# Patient Record
Sex: FEMALE | Race: WHITE | NOT HISPANIC OR LATINO | Employment: FULL TIME | URBAN - METROPOLITAN AREA
[De-identification: names, ages, dates, MRNs, and addresses within clinical notes are randomized per-mention and may not be internally consistent; named-entity substitution may affect disease eponyms.]

---

## 2017-11-13 ENCOUNTER — TELEPHONE (OUTPATIENT)
Dept: FAMILY MEDICINE | Facility: CLINIC | Age: 62
End: 2017-11-13

## 2017-11-13 NOTE — TELEPHONE ENCOUNTER
----- Message from Cici Haider sent at 11/13/2017 12:11 PM CST -----  Contact: self   Patient want to speak with a nurse regarding scheduling an appointment today. Patient has pain in hip area, also pain while breathing in. Please call back at [phone] (home)

## 2017-11-14 ENCOUNTER — OFFICE VISIT (OUTPATIENT)
Dept: FAMILY MEDICINE | Facility: CLINIC | Age: 62
End: 2017-11-14
Payer: COMMERCIAL

## 2017-11-14 VITALS
HEART RATE: 81 BPM | DIASTOLIC BLOOD PRESSURE: 74 MMHG | BODY MASS INDEX: 27.83 KG/M2 | SYSTOLIC BLOOD PRESSURE: 122 MMHG | WEIGHT: 163 LBS | TEMPERATURE: 99 F | HEIGHT: 64 IN

## 2017-11-14 DIAGNOSIS — Z23 NEED FOR IMMUNIZATION AGAINST INFLUENZA: ICD-10-CM

## 2017-11-14 DIAGNOSIS — M79.10 MYALGIA: ICD-10-CM

## 2017-11-14 DIAGNOSIS — Z00.00 ANNUAL PHYSICAL EXAM: Primary | ICD-10-CM

## 2017-11-14 DIAGNOSIS — J20.9 ACUTE BRONCHITIS, UNSPECIFIED ORGANISM: ICD-10-CM

## 2017-11-14 DIAGNOSIS — Z23 NEED FOR SHINGLES VACCINE: ICD-10-CM

## 2017-11-14 DIAGNOSIS — R07.89 OTHER CHEST PAIN: ICD-10-CM

## 2017-11-14 DIAGNOSIS — Z79.899 HIGH RISK MEDICATION USE: ICD-10-CM

## 2017-11-14 DIAGNOSIS — Z12.11 SCREEN FOR COLON CANCER: ICD-10-CM

## 2017-11-14 DIAGNOSIS — R50.9 FEVER AND CHILLS: ICD-10-CM

## 2017-11-14 DIAGNOSIS — Z11.59 NEED FOR HEPATITIS C SCREENING TEST: ICD-10-CM

## 2017-11-14 DIAGNOSIS — R05.9 COUGH: ICD-10-CM

## 2017-11-14 DIAGNOSIS — K21.9 GASTROESOPHAGEAL REFLUX DISEASE, ESOPHAGITIS PRESENCE NOT SPECIFIED: ICD-10-CM

## 2017-11-14 DIAGNOSIS — F41.9 ANXIETY: ICD-10-CM

## 2017-11-14 DIAGNOSIS — F32.A DEPRESSION: ICD-10-CM

## 2017-11-14 DIAGNOSIS — Z13.1 SCREENING FOR DIABETES MELLITUS: ICD-10-CM

## 2017-11-14 DIAGNOSIS — Z13.220 SCREENING FOR LIPID DISORDERS: ICD-10-CM

## 2017-11-14 PROCEDURE — 99999 PR PBB SHADOW E&M-EST. PATIENT-LVL III: CPT | Mod: PBBFAC,,, | Performed by: NURSE PRACTITIONER

## 2017-11-14 PROCEDURE — 90471 IMMUNIZATION ADMIN: CPT | Mod: S$GLB,,, | Performed by: FAMILY MEDICINE

## 2017-11-14 PROCEDURE — 99396 PREV VISIT EST AGE 40-64: CPT | Mod: 25,S$GLB,, | Performed by: NURSE PRACTITIONER

## 2017-11-14 PROCEDURE — 90686 IIV4 VACC NO PRSV 0.5 ML IM: CPT | Mod: S$GLB,,, | Performed by: FAMILY MEDICINE

## 2017-11-14 RX ORDER — AMOXICILLIN AND CLAVULANATE POTASSIUM 875; 125 MG/1; MG/1
1 TABLET, FILM COATED ORAL 2 TIMES DAILY WITH MEALS
Qty: 14 TABLET | Refills: 0 | Status: SHIPPED | OUTPATIENT
Start: 2017-11-14 | End: 2017-12-13 | Stop reason: ALTCHOICE

## 2017-11-14 RX ORDER — PROMETHAZINE HYDROCHLORIDE AND DEXTROMETHORPHAN HYDROBROMIDE 6.25; 15 MG/5ML; MG/5ML
5 SYRUP ORAL
Qty: 180 ML | Refills: 0 | Status: SHIPPED | OUTPATIENT
Start: 2017-11-14 | End: 2017-12-29

## 2017-11-14 RX ORDER — DULOXETIN HYDROCHLORIDE 60 MG/1
CAPSULE, DELAYED RELEASE ORAL
Qty: 30 CAPSULE | Refills: 10 | Status: SHIPPED | OUTPATIENT
Start: 2017-11-14 | End: 2018-12-18 | Stop reason: SDUPTHER

## 2017-11-14 NOTE — PATIENT INSTRUCTIONS
Acute Bronchitis  Your healthcare provider has told you that you have acute bronchitis. Bronchitis is infection or inflammation of the bronchial tubes (airways in the lungs). Normally, air moves easily in and out of the airways. Bronchitis narrows the airways, making it harder for air to flow in and out of the lungs. This causes symptoms such as shortness of breath, coughing up yellow or green mucus, and wheezing. Bronchitis can be acute or chronic. Acute means the condition comes on quickly and goes away in a short time, usually within 3 to 10 days. Chronic means a condition lasts a long time and often comes back.    What causes acute bronchitis?  Acute bronchitis almost always starts as a viral respiratory infection, such as a cold or the flu. Certain factors make it more likely for a cold or flu to turn into bronchitis. These include being very young, being elderly, having a heart or lung problem, or having a weak immune system. Cigarette smoking also makes bronchitis more likely.  When bronchitis develops, the airways become swollen. The airways may also become infected with bacteria. This is known as a secondary infection.  Diagnosing acute bronchitis  Your healthcare provider will examine you and ask about your symptoms and health history. You may also have a sputum culture to test the fluid in your lungs. Chest X-rays may be done to look for infection in the lungs.  Treating acute bronchitis  Bronchitis usually clears up as the cold or flu goes away. You can help feel better faster by doing the following:  · Take medicine as directed. You may be told to take ibuprofen or other over-the-counter medicines. These help relieve inflammation in your bronchial tubes. Your healthcare provider may prescribe an inhaler to help open up the bronchial tubes. Most of the time, acute bronchitis is caused by a viral infection. Antibiotics are usually not prescribed for viral infections.  · Drink plenty of fluids, such as  water, juice, or warm soup. Fluids loosen mucus so that you can cough it up. This helps you breathe more easily. Fluids also prevent dehydration.  · Make sure you get plenty of rest.  · Do not smoke. Do not allow anyone else to smoke in your home.  Recovery and follow-up  Follow up with your doctor as you are told. You will likely feel better in a week or two. But a dry cough can linger beyond that time. Let your doctor know if you still have symptoms (other than a dry cough) after 2 weeks, or if youre prone to getting bronchial infections. Take steps to protect yourself from future infections. These steps include stopping smoking and avoiding tobacco smoke, washing your hands often, and getting a yearly flu shot.  When to call your healthcare provider  Call the healthcare provider if you have any of the following:  · Fever of 100.4°F (38.0°C) or higher, or as advised  · Symptoms that get worse, or new symptoms  · Trouble breathing  · Symptoms that dont start to improve within a week, or within 3 days of taking antibiotics   Date Last Reviewed: 12/1/2016  © 5257-6266 [employer] [place]. [address] [place] [address], [place] [address]. All rights reserved. This information is not intended as a substitute for professional medical care. Always follow your healthcare professional's instructions.

## 2017-11-14 NOTE — PROGRESS NOTES
Subjective:       Patient ID: Gaby Donohue is a 62 y.o. female.    Chief Complaint: Cough (caused chest pain )    HPI   Chief Complaint  Chief Complaint   Patient presents with    Cough     caused chest pain        HPI  Gaby Donohue is a 62 y.o. female with medical diagnoses as listed in the medical history and problem list that presents with c/o cough at night with chest pain when breathing in. States cough was almost constant last week, felt a raw burning feeling to chest with a deep breath.  Has been fatigued and slept all day on Sunday.  Does have some runny nose and cough is productive of yellow secretions. Thinks may have had fever on Sunday.  Has tried some dayquil and nyquil, states nyquil did allow her to sleep but did not notice a change of symptoms.  Has also been feeling kind of achy all over. Symptoms constant for about 10 days. Patient has not been in for a routine check up and is overdue for some health maintenance.  Due for lipid panel, hep C screening, colonoscopy, flu immunization and wants to discuss zostavax.  Patient has pap with Dr. Jimenes and already has an order for her mammogram. BMI today is 27.98 with a 4 pound weight loss since last visit.     PAST MEDICAL HISTORY:  Past Medical History:   Diagnosis Date    Anxiety     Cancer     skin ca on face    Depression     Hyperlipidemia     Wears glasses        PAST SURGICAL HISTORY:  Past Surgical History:   Procedure Laterality Date    CHOLECYSTECTOMY      COLONOSCOPY  4/20/12    Dr. Jaime, 5 year recheck    FRACTURE SURGERY  11/02/2013    left arm Dr Cooper    laporoscopy         SOCIAL HISTORY:  Social History     Social History    Marital status:      Spouse name: N/A    Number of children: N/A    Years of education: N/A     Occupational History    Not on file.     Social History Main Topics    Smoking status: Former Smoker     Packs/day: 1.50     Years: 21.00     Quit date: 4/20/1984    Smokeless tobacco: Never  Used    Alcohol use Yes      Comment: occ    Drug use: No    Sexual activity: Yes     Partners: Male     Other Topics Concern    Not on file     Social History Narrative    No narrative on file       FAMILY HISTORY:  Family History   Problem Relation Age of Onset    COPD Mother     Emphysema Mother     Heart disease Father     Cancer Sister      skin cancer face    Cancer Brother      skin cancer face     No Known Problems Daughter     Cancer Sister      skin cancer face    Cancer Sister      skin cancer face     No Known Problems Brother     No Known Problems Daughter     No Known Problems Maternal Grandmother     No Known Problems Maternal Grandfather     No Known Problems Paternal Grandmother     No Known Problems Paternal Grandfather     No Known Problems Maternal Aunt     No Known Problems Maternal Uncle     No Known Problems Paternal Aunt     No Known Problems Paternal Uncle        ALLERGIES AND MEDICATIONS: updated and reviewed.  Review of patient's allergies indicates:   Allergen Reactions    No known drug allergies      Current Outpatient Prescriptions   Medication Sig Dispense Refill    duloxetine (CYMBALTA) 60 MG capsule TAKE 1 CAPSULE BY MOUTH ONCE DAILY 30 capsule 10    lansoprazole (PREVACID) 30 MG capsule TAKE 1 CAPSULE BY MOUTH ONCE DAILY 30 capsule 11    amoxicillin-clavulanate 875-125mg (AUGMENTIN) 875-125 mg per tablet Take 1 tablet by mouth 2 (two) times daily with meals. 14 tablet 0    promethazine-dextromethorphan (PROMETHAZINE-DM) 6.25-15 mg/5 mL Syrp Take 5 mLs by mouth every 4 to 6 hours as needed (cough). 180 mL 0     Current Facility-Administered Medications   Medication Dose Route Frequency Provider Last Rate Last Dose    triamcinolone acetonide injection 40 mg  40 mg Other 1 time in Clinic/HOD Duke Santiago MD           Review of Systems   Constitutional: Positive for appetite change, chills, fatigue and fever.        Appetite decreased over weekend, unable  "to taste, fatigued, feels on Sunday had chills with fever but did not measure temperature   HENT: Positive for congestion, rhinorrhea and trouble swallowing. Negative for ear pain, sinus pain, sinus pressure and sore throat.         Has had nasal congestion and runny nose, did have sore throat and lost voice one day   Eyes: Negative for visual disturbance.   Respiratory: Positive for cough. Negative for shortness of breath.         Cough, productive, burning to chest   Cardiovascular: Negative for chest pain, palpitations and leg swelling.   Gastrointestinal: Positive for nausea. Negative for abdominal pain, diarrhea and vomiting.        Some nausea and upset stomach on Saturday.    Takes prevacid for GERD with effect   Genitourinary: Negative for difficulty urinating.   Musculoskeletal: Positive for myalgias.   Skin: Negative for rash and wound.   Neurological: Positive for headaches.        Has had headache and has tried advil and tylenol with minimal short term relief   Hematological:        Some tender lymph nodes to neck   Psychiatric/Behavioral: Positive for sleep disturbance. Negative for dysphoric mood and suicidal ideas. The patient is not nervous/anxious.         Cough effecting sleep, normally sleeps well        Objective:       Vitals:    11/14/17 1349   BP: 122/74   BP Location: Right arm   Patient Position: Sitting   BP Method: Medium (Automatic)   Pulse: 81   Temp: 98.7 °F (37.1 °C)   TempSrc: Oral   Weight: 73.9 kg (163 lb)   Height: 5' 4" (1.626 m)     Physical Exam   Constitutional: She is oriented to person, place, and time. Vital signs are normal. She appears well-developed and well-nourished. No distress.   HENT:   Head: Normocephalic and atraumatic.   Right Ear: External ear and ear canal normal. Tympanic membrane is erythematous.   Left Ear: Tympanic membrane, external ear and ear canal normal.   Nose: Nose normal. No mucosal edema. Right sinus exhibits no maxillary sinus tenderness and no " frontal sinus tenderness. Left sinus exhibits no maxillary sinus tenderness and no frontal sinus tenderness.   Mouth/Throat: Mucous membranes are normal. Oropharyngeal exudate present.   TM to right with mild redness over bony landmark.    Throat with thick yellow PND noted   Eyes: Conjunctivae and lids are normal. Right conjunctiva is not injected. Left conjunctiva is not injected.   Neck: Normal carotid pulses present. Carotid bruit is not present.   Cardiovascular: Normal rate, regular rhythm, S1 normal, S2 normal and normal heart sounds.    No murmur heard.  Pulses:       Carotid pulses are 2+ on the right side, and 2+ on the left side.       Radial pulses are 2+ on the right side, and 2+ on the left side.   No edema   Pulmonary/Chest: Effort normal and breath sounds normal. No respiratory distress. She has no decreased breath sounds. She has no wheezes. She has no rhonchi. She has no rales.   Musculoskeletal: Normal range of motion.   Neurological: She is alert and oriented to person, place, and time. She has normal strength.   Skin: Skin is warm, dry and intact. Capillary refill takes less than 2 seconds. She is not diaphoretic. No pallor.   Psychiatric: She has a normal mood and affect. Her speech is normal and behavior is normal. Judgment and thought content normal. Cognition and memory are normal.   Nursing note and vitals reviewed.      Assessment:       1. Annual physical exam    2. Acute bronchitis, unspecified organism    3. Cough    4. Other chest pain    5. Myalgia    6. Fever and chills    7. Need for immunization against influenza    8. Need for hepatitis C screening test    9. High risk medication use    10. Screening for lipid disorders    11. Screening for diabetes mellitus    12. Screen for colon cancer    13. BMI 27.0-27.9,adult    14. Gastroesophageal reflux disease, esophagitis presence not specified    15. Anxiety    16. Need for shingles vaccine        Plan:   Gaby was seen today for cough  and routine exam.  Discussed healthy diet, regular exercise, necessary labs, age appropriate cancer screening, and routine vaccinations.    Diagnoses and all orders for this visit:    Annual physical exam    Discussed healthy diet, regular exercise, necessary labs, age appropriate cancer screening, and routine vaccinations.    Acute bronchitis, unspecified organism  -     amoxicillin-clavulanate 875-125mg (AUGMENTIN) 875-125 mg per tablet; Take 1 tablet by mouth 2 (two) times daily with meals.    Cough  -     amoxicillin-clavulanate 875-125mg (AUGMENTIN) 875-125 mg per tablet; Take 1 tablet by mouth 2 (two) times daily with meals.  -     promethazine-dextromethorphan (PROMETHAZINE-DM) 6.25-15 mg/5 mL Syrp; Take 5 mLs by mouth every 4 to 6 hours as needed (cough).    Other chest pain  -     amoxicillin-clavulanate 875-125mg (AUGMENTIN) 875-125 mg per tablet; Take 1 tablet by mouth 2 (two) times daily with meals.    Myalgia   Take ibuprofen 200-400 mg po TID PRN aches, pain, or fever  Fever and chills   Take ibuprofen 200-400 mg po TID PRN aches, pain, or fever  Need for immunization against influenza  -     Influenza - Quadrivalent (3 years & older) (PF)    Need for hepatitis C screening test  -     Hepatitis C antibody; Future    High risk medication use  -     Comprehensive metabolic panel; Future    Screening for lipid disorders  -     Comprehensive metabolic panel; Future  -     Lipid panel; Future    Screening for diabetes mellitus  -     Comprehensive metabolic panel; Future    Screen for colon cancer  -     Ambulatory referral to Gastroenterology    BMI 27.0-27.9,adult   Weight loss recommended with well balanced diet and portion controlled meals and increased activity.   Gastroesophageal reflux disease, esophagitis presence not specified   Stable with prevacid, continue medication  Anxiety   Stable with cymbalta, continue medication  Need for shingles vaccine    Instructed to check with medical insurer to see  if immunization is covered benefit    Return in about 1 year (around 11/14/2018), or if symptoms worsen or fail to improve.

## 2017-11-27 DIAGNOSIS — K21.9 GASTROESOPHAGEAL REFLUX DISEASE WITHOUT ESOPHAGITIS: ICD-10-CM

## 2017-11-27 RX ORDER — LANSOPRAZOLE 30 MG/1
CAPSULE, DELAYED RELEASE ORAL
Qty: 30 CAPSULE | Refills: 11 | Status: SHIPPED | OUTPATIENT
Start: 2017-11-27 | End: 2018-12-04 | Stop reason: SDUPTHER

## 2017-12-11 ENCOUNTER — TELEPHONE (OUTPATIENT)
Dept: FAMILY MEDICINE | Facility: CLINIC | Age: 62
End: 2017-12-11

## 2017-12-11 NOTE — TELEPHONE ENCOUNTER
----- Message from Gaby Leon sent at 12/11/2017  2:10 PM CST -----  Contact: Patient  Gaby, patient [phone], Patient was in a few weeks ago  with bronchitis and it still having issues. Please advise. Thanks.

## 2017-12-11 NOTE — TELEPHONE ENCOUNTER
Patient has pain in lungs when she takes breath- was seen 11/14/17 for bronchitis- has cough in morning and when lays down- patient advised to be seen- she has to check her schedule and will call back to make one.

## 2017-12-12 ENCOUNTER — TELEPHONE (OUTPATIENT)
Dept: FAMILY MEDICINE | Facility: CLINIC | Age: 62
End: 2017-12-12

## 2017-12-12 NOTE — TELEPHONE ENCOUNTER
----- Message from Fern Valenzuela sent at 12/12/2017  8:12 AM CST -----  Contact: Self  Patient spoke to Lauryn yesterday and is requesting same day appointment.  Please call [phone] (home).  Thank you!

## 2017-12-13 ENCOUNTER — OFFICE VISIT (OUTPATIENT)
Dept: FAMILY MEDICINE | Facility: CLINIC | Age: 62
End: 2017-12-13
Payer: COMMERCIAL

## 2017-12-13 VITALS
BODY MASS INDEX: 27.92 KG/M2 | TEMPERATURE: 98 F | DIASTOLIC BLOOD PRESSURE: 89 MMHG | RESPIRATION RATE: 14 BRPM | SYSTOLIC BLOOD PRESSURE: 136 MMHG | HEIGHT: 64 IN | WEIGHT: 163.56 LBS | OXYGEN SATURATION: 97 % | HEART RATE: 75 BPM

## 2017-12-13 DIAGNOSIS — J20.0 ACUTE BRONCHITIS DUE TO MYCOPLASMA PNEUMONIAE: Primary | ICD-10-CM

## 2017-12-13 PROCEDURE — 99214 OFFICE O/P EST MOD 30 MIN: CPT | Mod: S$GLB,,, | Performed by: FAMILY MEDICINE

## 2017-12-13 PROCEDURE — 99999 PR PBB SHADOW E&M-EST. PATIENT-LVL III: CPT | Mod: PBBFAC,,, | Performed by: FAMILY MEDICINE

## 2017-12-13 RX ORDER — AZITHROMYCIN 250 MG/1
TABLET, FILM COATED ORAL
Qty: 6 TABLET | Refills: 0 | Status: SHIPPED | OUTPATIENT
Start: 2017-12-13 | End: 2017-12-18

## 2017-12-13 NOTE — PROGRESS NOTES
Subjective:       Patient ID: Gaby Donohue is a 62 y.o. female.    Chief Complaint: Cough    Exposed to granddaughter who is on Zithromax for bronchitis.      Cough   This is a new problem. The current episode started in the past 7 days. The problem has been gradually worsening. The cough is productive of sputum. Associated symptoms include chest pain, headaches and shortness of breath. Pertinent negatives include no fever, rash, sore throat or wheezing. She has tried prescription cough suppressant for the symptoms. The treatment provided mild relief.     Review of Systems   Constitutional: Negative for fever.   HENT: Negative for sore throat.    Respiratory: Positive for cough and shortness of breath. Negative for wheezing.    Cardiovascular: Positive for chest pain.   Gastrointestinal: Negative for abdominal pain and nausea.   Skin: Negative for rash.   Neurological: Positive for headaches.   All other systems reviewed and are negative.      Objective:      Physical Exam   Constitutional: She appears well-developed. No distress.   HENT:   Right Ear: Tympanic membrane and ear canal normal. Tympanic membrane is not erythematous.   Left Ear: Tympanic membrane and ear canal normal. Tympanic membrane is not erythematous.   Nose: Mucosal edema present. Right sinus exhibits no maxillary sinus tenderness. Left sinus exhibits no maxillary sinus tenderness.   Mouth/Throat: Posterior oropharyngeal erythema present.   Neck: Neck supple.   Cardiovascular: Normal rate and regular rhythm.    No murmur heard.  Pulmonary/Chest: Effort normal and breath sounds normal. She has no wheezes. She has no rales.   Lymphadenopathy:     She has no cervical adenopathy.   Skin: Skin is warm and dry.       Assessment:       1. Acute bronchitis due to Mycoplasma pneumoniae        Plan:         Gaby was seen today for cough.    Diagnoses and all orders for this visit:    Acute bronchitis due to Mycoplasma pneumoniae  -     azithromycin  (Z-CONSTANCE) 250 MG tablet; Take 2 tablets by mouth on day 1; Take 1 tablet by mouth on days 2-5

## 2017-12-15 ENCOUNTER — TELEPHONE (OUTPATIENT)
Dept: FAMILY MEDICINE | Facility: CLINIC | Age: 62
End: 2017-12-15

## 2017-12-15 DIAGNOSIS — R06.2 WHEEZING: Primary | ICD-10-CM

## 2017-12-15 DIAGNOSIS — R06.02 SHORTNESS OF BREATH: ICD-10-CM

## 2017-12-15 NOTE — TELEPHONE ENCOUNTER
I have sent an inhaler to her pharmacy, [facility] on [address], that should help with inflammation and any shortness of breath and wheezing.   Thanks.  Meme

## 2017-12-15 NOTE — TELEPHONE ENCOUNTER
Patient asking if there is medication she can get to help with inflammation in lungs- hurts when she breathes- same symptoms as when seen 12/13/17.

## 2017-12-15 NOTE — TELEPHONE ENCOUNTER
----- Message from Magalis Membreno sent at 12/15/2017  9:38 AM CST -----  Contact: patient  Patient calling in regards to speaking with the Nurse. She has questions about her medication. Please advise.  Call back   Thanks!

## 2017-12-18 ENCOUNTER — TELEPHONE (OUTPATIENT)
Dept: FAMILY MEDICINE | Facility: CLINIC | Age: 62
End: 2017-12-18

## 2017-12-18 NOTE — TELEPHONE ENCOUNTER
----- Message from Destin Sutherland sent at 12/18/2017 12:34 PM CST -----  Contact: Gaby  Calling to speak with a nurse about some medications. Did not want to leave details because it is an ongoing conversation. Call [phone] (home)   Thanks!

## 2017-12-19 ENCOUNTER — DOCUMENTATION ONLY (OUTPATIENT)
Dept: FAMILY MEDICINE | Facility: CLINIC | Age: 62
End: 2017-12-19

## 2017-12-19 NOTE — PROGRESS NOTES
Pre-Visit Chart Review  For Appointment Scheduled on 12-20-17    Health Maintenance Due   Topic Date Due    Hepatitis C Screening  1955    TETANUS VACCINE  03/19/1973    Mammogram  01/02/2015    Pap Smear with HPV Cotest  01/02/2016    Lipid Panel  02/03/2017    Colonoscopy  04/20/2017

## 2017-12-20 ENCOUNTER — OFFICE VISIT (OUTPATIENT)
Dept: FAMILY MEDICINE | Facility: CLINIC | Age: 62
End: 2017-12-20
Attending: FAMILY MEDICINE
Payer: COMMERCIAL

## 2017-12-20 VITALS
TEMPERATURE: 98 F | HEIGHT: 64 IN | RESPIRATION RATE: 16 BRPM | DIASTOLIC BLOOD PRESSURE: 74 MMHG | OXYGEN SATURATION: 97 % | SYSTOLIC BLOOD PRESSURE: 130 MMHG | BODY MASS INDEX: 27.59 KG/M2 | HEART RATE: 83 BPM | WEIGHT: 161.63 LBS

## 2017-12-20 DIAGNOSIS — R05.9 COUGH: Primary | ICD-10-CM

## 2017-12-20 DIAGNOSIS — J98.01 BRONCHOSPASM: ICD-10-CM

## 2017-12-20 PROCEDURE — 99213 OFFICE O/P EST LOW 20 MIN: CPT | Mod: S$GLB,,, | Performed by: FAMILY MEDICINE

## 2017-12-20 PROCEDURE — 99999 PR PBB SHADOW E&M-EST. PATIENT-LVL III: CPT | Mod: PBBFAC,,, | Performed by: FAMILY MEDICINE

## 2017-12-20 RX ORDER — PREDNISONE 20 MG/1
TABLET ORAL
Qty: 8 TABLET | Refills: 0 | Status: SHIPPED | OUTPATIENT
Start: 2017-12-20 | End: 2017-12-29

## 2017-12-20 RX ORDER — MONTELUKAST SODIUM 10 MG/1
10 TABLET ORAL NIGHTLY
Qty: 30 TABLET | Refills: 5 | Status: SHIPPED | OUTPATIENT
Start: 2017-12-20 | End: 2019-01-29 | Stop reason: ALTCHOICE

## 2017-12-20 NOTE — PROGRESS NOTES
Subjective:       Patient ID: Gaby Donohue is a 62 y.o. female.    Chief Complaint: Cough    62-year-old female has been experiencing problems with a cough for several months.  She was initially seen and treated with Augmentin and then recently given Zithromax.  She's also had at least one course of steroids and an injection.  She comes in today because of a lingering cough, usually nonproductive in with a sensation of ulceration in the throat.  She has no fever or chills and no pleuritic chest pain.  She does have problems with reflux but doesn't appear to be related to the cough.  She is using Prevacid for that and has noted in the last 2 weeks that she is having more sour stomach sensation than usual.    Past Medical History:  No date: Anxiety  No date: Cancer      Comment: skin ca on face  No date: Depression  No date: Hyperlipidemia  No date: Wears glasses    Past Surgical History:  No date: CHOLECYSTECTOMY  4/20/12: COLONOSCOPY      Comment: Dr. Jaime, 5 year recheck  11/02/2013: FRACTURE SURGERY      Comment: left arm Dr Cooper  No date: laporoscopy      Current Outpatient Prescriptions:     DULoxetine (CYMBALTA) 60 MG capsule, TAKE 1 CAPSULE BY MOUTH ONCE DAILY, Disp: 30 capsule, Rfl: 10    lansoprazole (PREVACID) 30 MG capsule, TAKE 1 CAPSULE BY MOUTH ONCE DAILY, Disp: 30 capsule, Rfl: 11    promethazine-dextromethorphan (PROMETHAZINE-DM) 6.25-15 mg/5 mL Syrp, Take 5 mLs by mouth every 4 to 6 hours as needed (cough)., Disp: 180 mL, Rfl: 0          Review of Systems   Constitutional: Negative for chills and fever.   HENT: Negative for congestion, postnasal drip and rhinorrhea.    Respiratory: Positive for cough and wheezing. Negative for chest tightness and shortness of breath.    Cardiovascular: Negative for chest pain and palpitations.   Gastrointestinal: Positive for abdominal pain (dyspepsia). Negative for nausea and vomiting.       Objective:      Physical Exam   Constitutional: She appears  well-developed. No distress.   Good blood pressure control  Mildly overweight with BMI 27.7 she is down 9/10 of a pound from her last visit with me October 1, 2015   HENT:   Head: Normocephalic and atraumatic.   Right Ear: External ear normal.   Left Ear: External ear normal.   Nose: Nose normal.   Mouth/Throat: Oropharynx is clear and moist. No oropharyngeal exudate.   Eyes: EOM are normal. Pupils are equal, round, and reactive to light. No scleral icterus.   Neck: Normal range of motion. Neck supple. No JVD present. No tracheal deviation present. No thyromegaly present.   Cardiovascular: Normal rate, regular rhythm and normal heart sounds.  Exam reveals no gallop and no friction rub.    No murmur heard.  Pulmonary/Chest: Effort normal and breath sounds normal. No respiratory distress. She has no decreased breath sounds. She has no wheezes. She has no rhonchi. She has no rales. Chest wall is not dull to percussion. She exhibits no tenderness and no crepitus.   Lymphadenopathy:     She has no cervical adenopathy.   Skin: She is not diaphoretic.   Nursing note and vitals reviewed.      Assessment:       1. Cough    2. Bronchospasm        Plan:       1. Cough  - predniSONE (DELTASONE) 20 MG tablet; Take two a day for three days then one a day for two days  Dispense: 8 tablet; Refill: 0  - montelukast (SINGULAIR) 10 mg tablet; Take 1 tablet (10 mg total) by mouth every evening.  Dispense: 30 tablet; Refill: 5    2. Bronchospasm  - predniSONE (DELTASONE) 20 MG tablet; Take two a day for three days then one a day for two days  Dispense: 8 tablet; Refill: 0  - montelukast (SINGULAIR) 10 mg tablet; Take 1 tablet (10 mg total) by mouth every evening.  Dispense: 30 tablet; Refill: 5

## 2017-12-23 ENCOUNTER — NURSE TRIAGE (OUTPATIENT)
Dept: ADMINISTRATIVE | Facility: CLINIC | Age: 62
End: 2017-12-23

## 2017-12-23 RX ORDER — DULOXETIN HYDROCHLORIDE 60 MG/1
60 CAPSULE, DELAYED RELEASE ORAL DAILY
Qty: 3 CAPSULE | Refills: 0 | Status: SHIPPED | OUTPATIENT
Start: 2017-12-23 | End: 2017-12-26

## 2017-12-23 NOTE — TELEPHONE ENCOUNTER
Reason for Disposition   Caller requesting a refill, no triage required, and triager able to refill per unit policy    Protocols used: ST MEDICATION QUESTION CALL-A-AH

## 2017-12-23 NOTE — TELEPHONE ENCOUNTER
Reason for Disposition   Caller has already spoken to PCP or another triager    Protocols used: ST INFORMATION ONLY CALL-A-AH    I called Cymbalta in earlier today for this patient. See notes for further details.

## 2017-12-28 ENCOUNTER — TELEPHONE (OUTPATIENT)
Dept: FAMILY MEDICINE | Facility: CLINIC | Age: 62
End: 2017-12-28

## 2017-12-28 NOTE — TELEPHONE ENCOUNTER
"Has had "pinpricks" of sort of a stinging sensation to hands and a foot- advised to keep appt tomorrow  "

## 2017-12-28 NOTE — TELEPHONE ENCOUNTER
----- Message from Randi Smith sent at 12/28/2017 10:15 AM CST -----  Contact: Patient  Gaby, patient [phone] calling because she would like a call from the nurse, patient said that her hands are very itchy, she would like to know if she should see a doctor/ recommend any lotions. Please advise. Thanks.

## 2017-12-29 ENCOUNTER — LAB VISIT (OUTPATIENT)
Dept: LAB | Facility: HOSPITAL | Age: 62
End: 2017-12-29
Attending: FAMILY MEDICINE
Payer: COMMERCIAL

## 2017-12-29 ENCOUNTER — OFFICE VISIT (OUTPATIENT)
Dept: FAMILY MEDICINE | Facility: CLINIC | Age: 62
End: 2017-12-29
Payer: COMMERCIAL

## 2017-12-29 VITALS
DIASTOLIC BLOOD PRESSURE: 83 MMHG | TEMPERATURE: 98 F | SYSTOLIC BLOOD PRESSURE: 139 MMHG | HEART RATE: 83 BPM | HEIGHT: 64 IN | WEIGHT: 163.13 LBS | BODY MASS INDEX: 27.85 KG/M2

## 2017-12-29 DIAGNOSIS — R20.2 TINGLING: ICD-10-CM

## 2017-12-29 DIAGNOSIS — R20.2 TINGLING: Primary | ICD-10-CM

## 2017-12-29 DIAGNOSIS — G62.9 NEUROPATHY: ICD-10-CM

## 2017-12-29 LAB
ESTIMATED AVG GLUCOSE: 100 MG/DL
FOLATE SERPL-MCNC: 5.9 NG/ML
HBA1C MFR BLD HPLC: 5.1 %
VIT B12 SERPL-MCNC: 616 PG/ML

## 2017-12-29 PROCEDURE — 82746 ASSAY OF FOLIC ACID SERUM: CPT

## 2017-12-29 PROCEDURE — 99213 OFFICE O/P EST LOW 20 MIN: CPT | Mod: S$GLB,,, | Performed by: NURSE PRACTITIONER

## 2017-12-29 PROCEDURE — 36415 COLL VENOUS BLD VENIPUNCTURE: CPT | Mod: PO

## 2017-12-29 PROCEDURE — 99999 PR PBB SHADOW E&M-EST. PATIENT-LVL III: CPT | Mod: PBBFAC,,, | Performed by: NURSE PRACTITIONER

## 2017-12-29 PROCEDURE — 82607 VITAMIN B-12: CPT

## 2017-12-29 PROCEDURE — 83036 HEMOGLOBIN GLYCOSYLATED A1C: CPT

## 2017-12-29 RX ORDER — GABAPENTIN 100 MG/1
100 CAPSULE ORAL 3 TIMES DAILY PRN
Qty: 90 CAPSULE | Refills: 0 | Status: SHIPPED | OUTPATIENT
Start: 2017-12-29 | End: 2019-01-29 | Stop reason: ALTCHOICE

## 2017-12-29 NOTE — PROGRESS NOTES
Subjective:       Patient ID: Gaby Donohue is a 62 y.o. female.    Chief Complaint: tingling in hands    Ms. Donohue presents to the clinic today for bilateral hand tingling which began one week ago and occurs on and off.  This is worsened by the cold weather but she has noticed no color changes to the hands.  She reports dry skin to her hands and uses Gold Bond lotion.  She tried hydrocortisone cream for the tingling but this did not help.  Denies neck pain or hand weakness.  The feet are also mildly tingling but not as bad as the hands.        Review of Systems   Constitutional: Negative for chills and fever.   Eyes: Negative for visual disturbance.   Respiratory: Negative for cough and shortness of breath.    Cardiovascular: Negative for chest pain, palpitations and leg swelling.   Gastrointestinal: Negative for abdominal pain, constipation and diarrhea.   Neurological: Negative for weakness, numbness and headaches.        Tingling bilateral hands       Objective:      Physical Exam   Constitutional: She is oriented to person, place, and time. She appears well-nourished. No distress.   HENT:   Head: Normocephalic and atraumatic.   Right Ear: External ear normal.   Left Ear: External ear normal.   Mouth/Throat: Oropharynx is clear and moist. No oropharyngeal exudate.   Eyes: Pupils are equal, round, and reactive to light. Right eye exhibits no discharge. Left eye exhibits no discharge.   Neck: Neck supple. No thyromegaly present.   Cardiovascular: Normal rate and regular rhythm.  Exam reveals no gallop and no friction rub.    No murmur heard.  Pulses:       Radial pulses are 2+ on the right side, and 2+ on the left side.   Pulmonary/Chest: Effort normal and breath sounds normal. No respiratory distress. She has no wheezes. She has no rales.   Abdominal: Soft. She exhibits no distension. There is no tenderness.   Lymphadenopathy:     She has no cervical adenopathy.   Neurological: She is alert and oriented to  person, place, and time. Coordination normal.   Skin: Skin is warm and dry. No rash noted.   Dry skin to hands bilaterally   Psychiatric: She has a normal mood and affect. Her behavior is normal. Thought content normal.   Vitals reviewed.          Current Outpatient Prescriptions:     DULoxetine (CYMBALTA) 60 MG capsule, TAKE 1 CAPSULE BY MOUTH ONCE DAILY, Disp: 30 capsule, Rfl: 10    lansoprazole (PREVACID) 30 MG capsule, TAKE 1 CAPSULE BY MOUTH ONCE DAILY, Disp: 30 capsule, Rfl: 11    montelukast (SINGULAIR) 10 mg tablet, Take 1 tablet (10 mg total) by mouth every evening., Disp: 30 tablet, Rfl: 5    gabapentin (NEURONTIN) 100 MG capsule, Take 1 capsule (100 mg total) by mouth 3 (three) times daily as needed., Disp: 90 capsule, Rfl: 0    Current Facility-Administered Medications:     triamcinolone acetonide injection 40 mg, 40 mg, Other, 1 time in Clinic/HOD, Duke Santiago MD  Assessment:       1. Tingling    2. Neuropathy        Plan:       Tingling  -     Hemoglobin A1c; Future; Expected date: 12/29/2017  -     Vitamin B12; Future; Expected date: 12/29/2017  -     Folate; Future; Expected date: 12/29/2017    Neuropathy  Use gloves and keep hands warm since this seems to relieve symptom.    -     gabapentin (NEURONTIN) 100 MG capsule; Take 1 capsule (100 mg total) by mouth 3 (three) times daily as needed.  Dispense: 90 capsule; Refill: 0

## 2018-01-02 ENCOUNTER — TELEPHONE (OUTPATIENT)
Dept: FAMILY MEDICINE | Facility: CLINIC | Age: 63
End: 2018-01-02

## 2018-01-02 NOTE — TELEPHONE ENCOUNTER
----- Message from Radha Rodney sent at 1/2/2018 12:50 PM CST -----  Contact: self  Patient is calling for LAB results.  Please call patient at [phone]. Thanks!

## 2018-01-03 ENCOUNTER — TELEPHONE (OUTPATIENT)
Dept: FAMILY MEDICINE | Facility: CLINIC | Age: 63
End: 2018-01-03

## 2018-01-03 NOTE — TELEPHONE ENCOUNTER
----- Message from Natividad Cowart sent at 1/3/2018 10:17 AM CST -----  Contact: self  Patient is returning Lauryn's call regarding results. Patient requesting a call before the end of today, please.  Please call patient at [phone].  Thanks!

## 2018-06-21 DIAGNOSIS — Z12.39 BREAST CANCER SCREENING: ICD-10-CM

## 2018-08-08 ENCOUNTER — TELEPHONE (OUTPATIENT)
Dept: GASTROENTEROLOGY | Facility: CLINIC | Age: 63
End: 2018-08-08

## 2018-08-08 NOTE — TELEPHONE ENCOUNTER
----- Message from Alyssa Oliver sent at 8/8/2018 11:48 AM CDT -----  Contact: Patient  Patient is calling to schedule a colonoscopy, her last one was in 2012 with Dr. Jaime.  Call Back#[phone]  Thanks

## 2018-08-28 ENCOUNTER — TELEPHONE (OUTPATIENT)
Dept: FAMILY MEDICINE | Facility: CLINIC | Age: 63
End: 2018-08-28

## 2018-08-28 DIAGNOSIS — F41.8 SITUATIONAL ANXIETY: Primary | ICD-10-CM

## 2018-08-28 RX ORDER — ALPRAZOLAM 0.25 MG/1
0.25 TABLET ORAL 3 TIMES DAILY PRN
Qty: 30 TABLET | Refills: 0 | Status: SHIPPED | OUTPATIENT
Start: 2018-08-28 | End: 2019-01-29 | Stop reason: ALTCHOICE

## 2018-08-28 NOTE — TELEPHONE ENCOUNTER
Patient has stressful upcoming trip to [place] to visit family- she has had Xanax 0.5mg #30 for this in the past and is asking to have it sent in to [facility].

## 2018-08-28 NOTE — TELEPHONE ENCOUNTER
----- Message from Keily Mas sent at 8/28/2018 12:17 PM CDT -----  Contact: self [phone]  Please call her regarding medication she is interested in.  Thank you!

## 2018-09-11 ENCOUNTER — OFFICE VISIT (OUTPATIENT)
Dept: UROLOGY | Facility: CLINIC | Age: 63
End: 2018-09-11
Payer: COMMERCIAL

## 2018-09-11 VITALS
WEIGHT: 167.75 LBS | TEMPERATURE: 98 F | HEART RATE: 78 BPM | HEIGHT: 64 IN | BODY MASS INDEX: 28.64 KG/M2 | SYSTOLIC BLOOD PRESSURE: 135 MMHG | RESPIRATION RATE: 18 BRPM | DIASTOLIC BLOOD PRESSURE: 79 MMHG

## 2018-09-11 DIAGNOSIS — N36.2 URETHRAL CARUNCLE: Primary | ICD-10-CM

## 2018-09-11 DIAGNOSIS — N28.1 RENAL CYST: ICD-10-CM

## 2018-09-11 LAB
BILIRUB SERPL-MCNC: NORMAL MG/DL
BLOOD URINE, POC: NORMAL
COLOR, POC UA: YELLOW
GLUCOSE UR QL STRIP: NORMAL
KETONES UR QL STRIP: NORMAL
LEUKOCYTE ESTERASE URINE, POC: NORMAL
NITRITE, POC UA: NORMAL
PH, POC UA: 7
PROTEIN, POC: NORMAL
SPECIFIC GRAVITY, POC UA: 1
UROBILINOGEN, POC UA: NORMAL

## 2018-09-11 PROCEDURE — 99214 OFFICE O/P EST MOD 30 MIN: CPT | Mod: 25,S$GLB,, | Performed by: UROLOGY

## 2018-09-11 PROCEDURE — 81002 URINALYSIS NONAUTO W/O SCOPE: CPT | Mod: S$GLB,,, | Performed by: UROLOGY

## 2018-09-11 PROCEDURE — 99999 PR PBB SHADOW E&M-EST. PATIENT-LVL III: CPT | Mod: PBBFAC,,, | Performed by: UROLOGY

## 2018-09-11 NOTE — PROGRESS NOTES
OFFICE NOTE    CHIEF COMPLAINT:  Urethral caruncle, right renal cyst.    HISTORY OF PRESENT ILLNESS:  This 63-year-old female returns for routine   recheck.  She has a history of urethral caruncle for which she had seen Dr. Constantino who had performed the excision of a urethral caruncle on 11/08/2016.  She   was also noted to have a right renal cyst on a CT scan in 2012.  On followup   visit today, she is returning for routine recheck.  She also mentions that there   is a history of renal cell carcinoma in the family affecting her mother and her   brother.  On today's visit denies any complaints and is feeling very well.    MEDICAL HISTORY UPDATE:  Reveals no change in general health since her last   visit.    PHYSICAL EXAMINATION:  ABDOMEN:  Soft, benign and nontender.  No masses.  No hernias, no organomegaly.  PELVIC:  Normal female introitus.  No mass, no blood, no tenderness, although   there is a question of a possible small caruncle in urethral meatus, but has a   completely normal appearance and appears to be more likely part of the normal   urethral mucosa.    UA negative with pH 7.0.    FINAL IMPRESSION:  History of urethral caruncle, right renal cyst, family   history of renal cell carcinoma.    RECOMMENDATION:  We will obtain renal ultrasound and contact the patient with   the findings.      SONIA  dd: 09/11/2018 18:40:25 (CDT)  td: 09/12/2018 08:10:30 (CDT)  Doc ID   #[id number]  Job ID #[id number]    CC:

## 2018-09-20 ENCOUNTER — TELEPHONE (OUTPATIENT)
Dept: FAMILY MEDICINE | Facility: CLINIC | Age: 63
End: 2018-09-20

## 2018-09-20 ENCOUNTER — HOSPITAL ENCOUNTER (OUTPATIENT)
Dept: RADIOLOGY | Facility: CLINIC | Age: 63
Discharge: HOME | End: 2018-09-20
Attending: UROLOGY
Payer: COMMERCIAL

## 2018-09-20 DIAGNOSIS — N28.1 RENAL CYST: ICD-10-CM

## 2018-09-20 PROCEDURE — 76770 US EXAM ABDO BACK WALL COMP: CPT | Mod: 26,,, | Performed by: RADIOLOGY

## 2018-09-20 PROCEDURE — 76770 US EXAM ABDO BACK WALL COMP: CPT | Mod: TC,PO

## 2018-09-20 NOTE — TELEPHONE ENCOUNTER
Patient notified Dr. Garnica is not accepting new patients at this time and his annual check ups are not open until January.

## 2018-09-25 ENCOUNTER — TELEPHONE (OUTPATIENT)
Dept: UROLOGY | Facility: CLINIC | Age: 63
End: 2018-09-25

## 2018-09-25 NOTE — TELEPHONE ENCOUNTER
----- Message from Hebert Aguilar sent at 9/25/2018 10:15 AM CDT -----  Contact: self   Patient want to speak with a nurse regarding test results please call back at [phone]

## 2018-09-25 NOTE — TELEPHONE ENCOUNTER
Returned call, results given with recommendation, 6 month recall put in, patient verbally understood.

## 2018-10-29 ENCOUNTER — TELEPHONE (OUTPATIENT)
Dept: UROLOGY | Facility: CLINIC | Age: 63
End: 2018-10-29

## 2018-10-29 DIAGNOSIS — R82.998 CELLS AND CASTS IN URINE: Primary | ICD-10-CM

## 2018-10-29 NOTE — TELEPHONE ENCOUNTER
----- Message from Yony Alcantar sent at 10/29/2018  9:03 AM CDT -----  Type:  Patient Returning Call    Who Called: patient  Who Left Message for Patient: nurse  Does the patient know what this is regarding?: possible kidney infection   Best Call Back Number: [phone]

## 2018-10-29 NOTE — TELEPHONE ENCOUNTER
Spoke with patient she states she experiencing some flank pain. She is not sure if its related to her back arthritis may need to see orthopedic doctor. Will place orders for patient to drop off urine sample at the lab for u/a and urine culture.

## 2018-10-30 ENCOUNTER — LAB VISIT (OUTPATIENT)
Dept: LAB | Facility: HOSPITAL | Age: 63
End: 2018-10-30
Attending: UROLOGY
Payer: COMMERCIAL

## 2018-10-30 DIAGNOSIS — R82.998 CELLS AND CASTS IN URINE: ICD-10-CM

## 2018-10-30 LAB
BILIRUB UR QL STRIP: NEGATIVE
CLARITY UR: CLEAR
COLOR UR: YELLOW
GLUCOSE UR QL STRIP: NEGATIVE
HGB UR QL STRIP: NEGATIVE
KETONES UR QL STRIP: NEGATIVE
LEUKOCYTE ESTERASE UR QL STRIP: NEGATIVE
NITRITE UR QL STRIP: NEGATIVE
PH UR STRIP: 8 [PH] (ref 5–8)
PROT UR QL STRIP: NEGATIVE
SP GR UR STRIP: 1.01 (ref 1–1.03)
URN SPEC COLLECT METH UR: NORMAL
UROBILINOGEN UR STRIP-ACNC: NEGATIVE EU/DL

## 2018-10-30 PROCEDURE — 87086 URINE CULTURE/COLONY COUNT: CPT

## 2018-10-30 PROCEDURE — 81003 URINALYSIS AUTO W/O SCOPE: CPT

## 2018-10-31 LAB — BACTERIA UR CULT: NORMAL

## 2018-11-01 ENCOUNTER — TELEPHONE (OUTPATIENT)
Dept: UROLOGY | Facility: CLINIC | Age: 63
End: 2018-11-01

## 2018-11-01 NOTE — TELEPHONE ENCOUNTER
----- Message from Maximus uDque sent at 11/1/2018  2:00 PM CDT -----  Contact: patient  Type:  Test Results    Who Called:  patient  Name of Test (Labs):    Date of Test:  10/30  Ordering Provider:    Where the test was performed:  [facility]  Best Call Back Number:  [phone]  Additional Information:  Requesting a call back to discuss results

## 2018-11-09 ENCOUNTER — TELEPHONE (OUTPATIENT)
Dept: FAMILY MEDICINE | Facility: CLINIC | Age: 63
End: 2018-11-09

## 2018-11-09 NOTE — TELEPHONE ENCOUNTER
----- Message from Mariam Baeza sent at 11/9/2018  9:24 AM CST -----  Type: Needs Medical Advice    Who Called: Patient   Best Call Back Number: [phone] (home)     Additional Information: Patient called regarding scheduling an appt next week with Dr. Garnica, advise he is out, wanted to discuss information given with his nurse

## 2018-11-09 NOTE — TELEPHONE ENCOUNTER
Patient seen by urology for flank pain- ruled out any kidney condition- still having pain. She has decided to see her ortho specialist Dr. Hester. No appt needed here.

## 2018-11-09 NOTE — TELEPHONE ENCOUNTER
----- Message from Nikole Hernandez sent at 11/9/2018 11:17 AM CST -----  Type: Needs Medical Advice    Who Called:  Self Symptoms (please be specific):  NA  How long has patient had these symptoms:  [facility]   Pharmacy name and phone #:  [facility]   Best Call Back Number: [phone]  Additional Information: Patient asking what type of doctor to see for side pain.

## 2018-12-04 DIAGNOSIS — K21.9 GASTROESOPHAGEAL REFLUX DISEASE WITHOUT ESOPHAGITIS: ICD-10-CM

## 2018-12-04 RX ORDER — LANSOPRAZOLE 30 MG/1
CAPSULE, DELAYED RELEASE ORAL
Qty: 30 CAPSULE | Refills: 0 | Status: SHIPPED | OUTPATIENT
Start: 2018-12-04 | End: 2018-12-18 | Stop reason: SDUPTHER

## 2018-12-18 ENCOUNTER — TELEPHONE (OUTPATIENT)
Dept: FAMILY MEDICINE | Facility: CLINIC | Age: 63
End: 2018-12-18

## 2018-12-18 DIAGNOSIS — F32.A DEPRESSION: ICD-10-CM

## 2018-12-18 DIAGNOSIS — K21.9 GASTROESOPHAGEAL REFLUX DISEASE WITHOUT ESOPHAGITIS: ICD-10-CM

## 2018-12-18 RX ORDER — LANSOPRAZOLE 30 MG/1
30 CAPSULE, DELAYED RELEASE ORAL DAILY
Qty: 30 CAPSULE | Refills: 0 | Status: SHIPPED | OUTPATIENT
Start: 2018-12-18 | End: 2019-01-18 | Stop reason: SDUPTHER

## 2018-12-18 RX ORDER — DULOXETIN HYDROCHLORIDE 60 MG/1
60 CAPSULE, DELAYED RELEASE ORAL DAILY
Qty: 30 CAPSULE | Refills: 0 | Status: SHIPPED | OUTPATIENT
Start: 2018-12-18 | End: 2019-01-24 | Stop reason: SDUPTHER

## 2018-12-18 NOTE — TELEPHONE ENCOUNTER
----- Message from Magalis Membreno sent at 12/18/2018  3:44 PM CST -----  Contact: Patient  Type:  RX Refill Request    Who Called:  Patient  Refill or New Rx: Refill  RX Name and Strength:  lansoprazole (PREVACID) 30 MG capsule and DULoxetine (CYMBALTA) 60 MG capsule  How is the patient currently taking it? (ex. 1XDay):  Prevacid-TAKE 1 CAPSULE BY MOUTH ONCE DAILY  Cymbalta-TAKE 1 CAPSULE BY MOUTH ONCE DAILY  Is this a 30 day or 90 day RX: both are 30 capsules  Preferred Pharmacy with phone number:   [id number] - [place], [place] - [address] [address]  Phone: [phone] Fax: [phone]  Local or Mail Order:  Local  Ordering Provider:  Dr Kenn Brantley Call Back Number:    Additional Information:  Calling to request a refill. Please advise.

## 2019-01-15 DIAGNOSIS — K21.9 GASTROESOPHAGEAL REFLUX DISEASE WITHOUT ESOPHAGITIS: ICD-10-CM

## 2019-01-18 RX ORDER — LANSOPRAZOLE 30 MG/1
CAPSULE, DELAYED RELEASE ORAL
Qty: 30 CAPSULE | Refills: 0 | Status: SHIPPED | OUTPATIENT
Start: 2019-01-18 | End: 2019-01-29 | Stop reason: SDUPTHER

## 2019-01-18 NOTE — TELEPHONE ENCOUNTER
Patient has booked apt with Ms Moore on 1-29-19 at noon. She had to cancel the last apt due to daughter was in hospital. Please send at least enough medication to last until the apt.

## 2019-01-24 ENCOUNTER — TELEPHONE (OUTPATIENT)
Dept: FAMILY MEDICINE | Facility: CLINIC | Age: 64
End: 2019-01-24

## 2019-01-24 DIAGNOSIS — F32.A DEPRESSION: ICD-10-CM

## 2019-01-24 RX ORDER — DULOXETIN HYDROCHLORIDE 60 MG/1
CAPSULE, DELAYED RELEASE ORAL
Qty: 30 CAPSULE | Refills: 0 | Status: SHIPPED | OUTPATIENT
Start: 2019-01-24 | End: 2019-01-29 | Stop reason: SDUPTHER

## 2019-01-24 NOTE — TELEPHONE ENCOUNTER
----- Message from Mariam Baeza sent at 1/24/2019  4:36 PM CST -----  Contact: patient  Type: Needs Medical Advice    Who Called: patient  Best Call Back Number:.[phone] (home)   Additional Information: Pt req that Cymbalta be called in before 6 that's is when her pharmacy closes would like to pick it up today, thanks

## 2019-01-29 ENCOUNTER — OFFICE VISIT (OUTPATIENT)
Dept: FAMILY MEDICINE | Facility: CLINIC | Age: 64
End: 2019-01-29
Payer: COMMERCIAL

## 2019-01-29 VITALS
HEART RATE: 82 BPM | WEIGHT: 167 LBS | HEIGHT: 64 IN | TEMPERATURE: 98 F | BODY MASS INDEX: 28.51 KG/M2 | SYSTOLIC BLOOD PRESSURE: 128 MMHG | DIASTOLIC BLOOD PRESSURE: 85 MMHG

## 2019-01-29 DIAGNOSIS — E78.2 MIXED HYPERLIPIDEMIA: ICD-10-CM

## 2019-01-29 DIAGNOSIS — Z79.899 HIGH RISK MEDICATION USE: ICD-10-CM

## 2019-01-29 DIAGNOSIS — Z00.00 ANNUAL PHYSICAL EXAM: Primary | ICD-10-CM

## 2019-01-29 DIAGNOSIS — Z12.11 COLON CANCER SCREENING: ICD-10-CM

## 2019-01-29 DIAGNOSIS — K21.9 GASTROESOPHAGEAL REFLUX DISEASE WITHOUT ESOPHAGITIS: ICD-10-CM

## 2019-01-29 DIAGNOSIS — F32.5 MAJOR DEPRESSIVE DISORDER WITH SINGLE EPISODE, IN FULL REMISSION: ICD-10-CM

## 2019-01-29 PROCEDURE — 99396 PREV VISIT EST AGE 40-64: CPT | Mod: S$GLB,,, | Performed by: NURSE PRACTITIONER

## 2019-01-29 PROCEDURE — 99999 PR PBB SHADOW E&M-EST. PATIENT-LVL III: CPT | Mod: PBBFAC,,, | Performed by: NURSE PRACTITIONER

## 2019-01-29 PROCEDURE — 99396 PR PREVENTIVE VISIT,EST,40-64: ICD-10-PCS | Mod: S$GLB,,, | Performed by: NURSE PRACTITIONER

## 2019-01-29 PROCEDURE — 99999 PR PBB SHADOW E&M-EST. PATIENT-LVL III: ICD-10-PCS | Mod: PBBFAC,,, | Performed by: NURSE PRACTITIONER

## 2019-01-29 RX ORDER — LANSOPRAZOLE 30 MG/1
30 CAPSULE, DELAYED RELEASE ORAL DAILY
Qty: 30 CAPSULE | Refills: 11 | Status: SHIPPED | OUTPATIENT
Start: 2019-01-29 | End: 2020-02-14 | Stop reason: SDUPTHER

## 2019-01-29 RX ORDER — DULOXETIN HYDROCHLORIDE 60 MG/1
60 CAPSULE, DELAYED RELEASE ORAL DAILY
Qty: 30 CAPSULE | Refills: 11 | Status: SHIPPED | OUTPATIENT
Start: 2019-01-29 | End: 2020-02-11

## 2019-01-29 RX ORDER — FAMOTIDINE 20 MG/1
20 TABLET, FILM COATED ORAL 2 TIMES DAILY
Qty: 60 TABLET | Refills: 11
Start: 2019-01-29 | End: 2020-06-15 | Stop reason: ALTCHOICE

## 2019-01-29 NOTE — PROGRESS NOTES
Subjective:       Patient ID: Gaby Donohue is a 63 y.o. female.    Chief Complaint: Annual Exam    Chief Complaint  Chief Complaint   Patient presents with    Annual Exam       HPI  Gaby Donohue is a 63 y.o. female with medical diagnoses as listed in the medical history and problem list that presents for an annual exam.   Patient is regularly treated for anxiety, depression, and GERD.  Blood pressure today is 128/85.  BMI is 28.67 and the patient has gained 4 lb since her last visit on 12/29/2017.  The patient is due for a repeat colonoscopy and she also is due for a tetanus diphtheria booster.      PAST MEDICAL HISTORY:  Past Medical History:   Diagnosis Date    Anxiety     Cancer     skin ca on face    Depression     Hyperlipidemia     Wears glasses        PAST SURGICAL HISTORY:  Past Surgical History:   Procedure Laterality Date    CHOLECYSTECTOMY      COLONOSCOPY  4/20/12    Dr. Jaime, 5 year recheck    COLONOSCOPY N/A 4/20/2012    [facility]    CYSTOSCOPY - excision of urethral caruncle N/A 11/8/2016    [facility] by Reno [facility] at [facility] OR    EGD (ESOPHAGOGASTRODUODENOSCOPY) N/A 8/17/2012    [facility] at [facility]    FRACTURE SURGERY  11/02/2013    left arm Dr Cooper    laporoscopy         SOCIAL HISTORY:  Social History     Socioeconomic History    Marital status:      Spouse name: Not on file    Number of children: Not on file    Years of education: Not on file    Highest education level: Not on file   Social Needs    Financial resource strain: Not on file    Food insecurity - worry: Not on file    Food insecurity - inability: Not on file    Transportation needs - medical: Not on file    Transportation needs - non-medical: Not on file   Occupational History    Not on file   Tobacco Use    Smoking status: Former Smoker     Packs/day: 1.50     Years: 21.00     Pack years: 31.50     Last attempt to  quit: 1984     Years since quittin.8    Smokeless tobacco: Never Used   Substance and Sexual Activity    Alcohol use: Yes     Comment: occ    Drug use: No    Sexual activity: Yes     Partners: Male   Other Topics Concern    Not on file   Social History Narrative    Not on file       FAMILY HISTORY:  Family History   Problem Relation Age of Onset    COPD Mother     Emphysema Mother     Heart disease Father     Cancer Sister         skin cancer face    Cancer Brother         skin cancer face     No Known Problems Daughter     Cancer Sister         skin cancer face    Cancer Sister         skin cancer face     No Known Problems Brother     No Known Problems Daughter        ALLERGIES AND MEDICATIONS: updated and reviewed.  Review of patient's allergies indicates:   Allergen Reactions    No known drug allergies      Current Outpatient Medications   Medication Sig Dispense Refill    DULoxetine (CYMBALTA) 60 MG capsule Take 1 capsule (60 mg total) by mouth once daily. 30 capsule 11    lansoprazole (PREVACID) 30 MG capsule Take 1 capsule (30 mg total) by mouth once daily. 30 capsule 11    famotidine (PEPCID AC) 20 MG tablet Take 1 tablet (20 mg total) by mouth 2 (two) times daily. As needed before meal that may cause acid reflux/heart burn 60 tablet 11     No current facility-administered medications for this visit.        I have reviewed the patient's medical history in detail and updated the computerized patient record.    Review of Systems   Constitutional: Negative for activity change, appetite change, chills, fatigue and fever.        No regular exercise, active lifestyle   HENT: Negative for congestion, ear pain, postnasal drip, rhinorrhea, sinus pressure, sinus pain and sore throat.    Eyes: Negative for visual disturbance.        Wears glasses with good correction   Respiratory: Negative for cough and shortness of breath.    Cardiovascular: Negative for chest pain, palpitations and leg  "swelling.   Gastrointestinal: Positive for abdominal pain. Negative for blood in stool, constipation, diarrhea, nausea and vomiting.        Diet related acid reflux/heartburn   Genitourinary: Negative for difficulty urinating, dysuria and urgency.   Musculoskeletal: Negative for arthralgias and myalgias.   Skin: Negative for rash and wound.   Neurological: Positive for headaches. Negative for dizziness, weakness and numbness.        Occasional regular headache, relieved with tylenol   Psychiatric/Behavioral: Negative for dysphoric mood and sleep disturbance. The patient is not nervous/anxious.          Objective:      Vitals:    01/29/19 1214   BP: 128/85   BP Location: Right arm   Patient Position: Sitting   BP Method: Large (Automatic)   Pulse: 82   Temp: 98.2 °F (36.8 °C)   TempSrc: Oral   Weight: 75.8 kg (167 lb)   Height: 5' 4" (1.626 m)     Physical Exam   Constitutional: She is oriented to person, place, and time. Vital signs are normal. She appears well-developed. No distress.   HENT:   Head: Normocephalic and atraumatic.   Right Ear: Tympanic membrane, external ear and ear canal normal.   Left Ear: Tympanic membrane, external ear and ear canal normal.   Nose: Nose normal. No mucosal edema.   Mouth/Throat: Oropharynx is clear and moist and mucous membranes are normal. No oropharyngeal exudate.   Eyes: Conjunctivae and lids are normal. Pupils are equal, round, and reactive to light. Right eye exhibits no discharge. Left eye exhibits no discharge. Right conjunctiva is not injected. Left conjunctiva is not injected.   Neck: Normal range of motion. Neck supple. Normal carotid pulses present. Carotid bruit is not present.   Cardiovascular: Normal rate, regular rhythm, S1 normal, S2 normal, normal heart sounds, intact distal pulses and normal pulses.   No murmur heard.  Pulses:       Carotid pulses are 2+ on the right side, and 2+ on the left side.       Radial pulses are 2+ on the right side, and 2+ on the left " side.        Posterior tibial pulses are 2+ on the right side, and 2+ on the left side.   Pulmonary/Chest: Effort normal and breath sounds normal. No respiratory distress. She has no decreased breath sounds. She has no wheezes. She has no rhonchi. She has no rales.   Abdominal: Soft. Normal appearance, normal aorta and bowel sounds are normal. She exhibits no distension, no abdominal bruit, no pulsatile midline mass and no mass. There is no hepatosplenomegaly. There is no tenderness. No hernia.   Musculoskeletal: Normal range of motion. She exhibits no edema, tenderness or deformity.   Lymphadenopathy:     She has no cervical adenopathy.        Right: No supraclavicular adenopathy present.        Left: No supraclavicular adenopathy present.   Neurological: She is alert and oriented to person, place, and time. She has normal strength.   Skin: Skin is warm, dry and intact. No rash noted. She is not diaphoretic. No erythema. No pallor.   Psychiatric: She has a normal mood and affect. Her speech is normal and behavior is normal. Judgment and thought content normal. Her mood appears not anxious. Cognition and memory are normal. She does not exhibit a depressed mood.   Nursing note and vitals reviewed.        Assessment:       1. Annual physical exam    2. Major depressive disorder with single episode, in full remission    3. Gastroesophageal reflux disease without esophagitis    4. High risk medication use    5. Colon cancer screening    6. Mixed hyperlipidemia    7. BMI 28.0-28.9,adult          Plan:       Gaby was seen today for annual exam.    Diagnoses and all orders for this visit:    Annual physical exam   Discussed healthy diet, regular exercise, necessary labs, age appropriate cancer screening, and routine vaccinations.  Patient declines tetanus diptheria booster today.   Educational handouts provided.  Prevention guidelines women age  50-64  Major depressive disorder with single episode, in full remission  -      DULoxetine (CYMBALTA) 60 MG capsule; Take 1 capsule (60 mg total) by mouth once daily.  -     Comprehensive metabolic panel; Future  - medication is effective at current dose, to continue as is    Gastroesophageal reflux disease without esophagitis  -     lansoprazole (PREVACID) 30 MG capsule; Take 1 capsule (30 mg total) by mouth once daily.  -     famotidine (PEPCID AC) 20 MG tablet; Take 1 tablet (20 mg total) by mouth 2 (two) times daily. As needed before meal that may cause acid reflux/heart burn  - patient reports that she does have some diet related heartburn after eating foods like tomato sauce or spicy meals.  Advised patient that this does not mean that the Prevacid is not working the way it is supposed to.  There is a need to neutral lyse the acidity in acid foods when they are eaten to prevent heartburn.  Patient instructed to try Pepcid AC over-the-counter as needed prior to a meal that would cause heartburn.  - Educational handouts provided.  GERD    High risk medication use  -     Comprehensive metabolic panel; Future    Colon cancer screening  -     Ambulatory referral to Gastroenterology    Mixed hyperlipidemia  -     Lipid panel; Future  -   Lab Results   Component Value Date    CHOL 229 (H) 12/29/2017    CHOL 242 (H) 02/03/2012    CHOL 195 04/23/2010     Lab Results   Component Value Date    HDL 60 12/29/2017    HDL 60 02/03/2012    HDL 48 04/23/2010     Lab Results   Component Value Date    LDLCALC 144.6 12/29/2017    LDLCALC 165.0 (H) 02/03/2012    LDLCALC 131.8 (H) 04/23/2010     Lab Results   Component Value Date    TRIG 122 12/29/2017    TRIG 84 02/03/2012    TRIG 76 04/23/2010     Lab Results   Component Value Date    CHOLHDL 26.2 12/29/2017    CHOLHDL 24.8 02/03/2012    CHOLHDL 24.6 04/23/2010     The 10-year ASCVD risk score (Magdi CORDOVA Jr., et al., 2013) is: 4.7%    Values used to calculate the score:      Age: 63 years      Sex: Female      Is Non- : No       Diabetic: No      Tobacco smoker: No      Systolic Blood Pressure: 128 mmHg      Is BP treated: No      HDL Cholesterol: 60 mg/dL      Total Cholesterol: 229 mg/dL     Will reassess need for statin medication when new lipid panel results are available    BMI 28.0-28.9,adult   BMI is 28.67 today and the patient has gained 4 lb since her last visit on 12/29/2017   Weight loss recommended with well balanced diet and portion controlled meals and increased activity.     Follow-up in about 1 year (around 1/29/2020) for annual.

## 2019-01-29 NOTE — PATIENT INSTRUCTIONS
Prevention Guidelines, Women Ages 50 to 64  Screening tests and vaccines are an important part of managing your health. Health counseling is essential, too. Below are guidelines for these, for women ages 50 to 64. Talk with your healthcare provider to make sure youre up to date on what you need.  Screening Who needs it How often   Type 2 diabetes or prediabetes All adults beginning at age 45 and adults without symptoms at any age who are overweight or obese and have 1 or more additional risk factors for diabetes. At  least every 3 years   Alcohol misuse All women in this age group At routine exams   Blood pressure All women in this age group Every 2 years if your blood pressure is less than 120/80 mm Hg; yearly if your systolic blood pressure is 120 to 139 mm Hg, or your diastolic blood pressure reading is 80 to 89 mm Hg   Breast cancer All women in this age group Yearly mammogram and clinical breast exam1   Cervical cancer All women in this age group, except women who have had a complete hysterectomy Pap test every 3 years or Pap test with human papillomavirus (HPV) test every 5 years   Chlamydia Women at increased risk for infection At routine exams   Colorectal cancer All women in this age group Flexible sigmoidoscopy every 5 years, or colonoscopy every 10 years, or double-contrast barium enema every 5 years; yearly fecal occult blood test or fecal immunochemical test; or a stool DNA test as often as your health care provider advises; talk with your health care provider about which tests are best for you   Depression All women in this age group At routine exams   Gonorrhea Sexually active women at increased risk for infection At routine exams   Hepatitis C Anyone at increased risk; 1 time for those born between 1945 and 1965 At routine exams   High cholesterol or triglycerides All women in this age group who are at risk for coronary artery disease At least every 5 years   HIV All women At routine exams   Lung  cancer Adults age 55 to 80 who have smoked Yearly screening in smokers with 30 pack-year history of smoking or who quit within 15 years   Obesity All women in this age group At routine exams   Osteoporosis Women who are postmenopausal Ask your healthcare provider   Syphilis Women at increased risk for infection - talk with your healthcare provider At routine exams   Tuberculosis Women at increased risk for infection - talk with your healthcare provider Ask your healthcare provider   Vision All women in this age group Ask your healthcare provider   Vaccine Who needs it How often   Chickenpox (varicella) All women in this age group who have no record of this infection or vaccine 2 doses; the second dose should be given at least 4 weeks after the first dose   Hepatitis A Women at increased risk for infection - talk with your healthcare provider 2 doses given at least 6 months apart   Hepatitis B Women at increased risk for infection - talk with your healthcare provider 3 doses over 6 months; second dose should be given 1 month after the first dose; the third dose should be given at least 2 months after the second dose and at least 4 months after the first dose   Haemophilus influenzaeType B (HIB) Women at increased risk for infection - talk with your healthcare provider 1 to 3 doses   Influenza (flu) All women in this age group Once a year   Measles, mumps, rubella (MMR) Women in this age group through their late 50s who have no record of these infections or vaccines 1 dose   Meningococcal Women at increased risk for infection - talk with your healthcare provider 1 or more doses   Pneumococcal conjugate vaccine (PCV13) and pneumococcal polysaccharide vaccine (PPSV23) Women at increased risk for infection - talk with your healthcare provider PCV13: 1 dose ages 19 to 65 (protects against 13 types of pneumococcal bacteria)  PPSV23: 1 to 2 doses through age 64, or 1 dose at 65 or older (protects against 23 types of  pneumococcal bacteria)   Tetanus/diphtheria/pertussis (Td/Tdap) booster All women in this age group Td every 10 years, or a one-time dose of Tdap instead of a Td booster after age 18, then Td every 10 years   Zoster All women ages 60 and older 1 dose   Counseling Who needs it How often   BRCA gene mutation testing for breast and ovarian cancer susceptibility Women with increased risk for having gene mutation When your risk is known   Breast cancer and chemoprevention Women at high risk for breast cancer When your risk is known   Diet and exercise Women who are overweight or obese When diagnosed, and then at routine exams   Sexually transmitted infection prevention Women at increased risk for infection - talk with your healthcare provider At routine exams   Use of daily aspirin Women ages 55 and up in this age group who are at risk for cardiovascular health problems such as stroke When your risk is known   Use of tobacco and the health effects it can cause All women in this age group Every exam   1American Cancer Society  Date Last Reviewed: 1/26/2016  © 0012-5494 [employer], [employer]. [address] [place] [address]. All rights reserved. This information is not intended as a substitute for professional medical care. Always follow your healthcare professional's instructions.        GERD (Adult)    The esophagus is a tube that carries food from the mouth to the stomach. A valve at the lower end of the esophagus prevents stomach acid from flowing upward. When this valve doesn't work properly, stomach contents may repeatedly flow back up (reflux) into the esophagus. This is called gastroesophageal reflux disease (GERD). GERD can irritate the esophagus. It can cause problems with swallowing or breathing. In severe cases, GERD can cause recurrent pneumonia or other serious problems.  Symptoms of reflux include burning, pressure or sharp pain in the upper abdomen or mid to lower chest. The pain can spread to  "the neck, back, or shoulder. There may be belching, an acid taste in the back of the throat, chronic cough, or sore throat or hoarseness. GERD symptoms often occur during the day after a big meal. They can also occur at night when lying down.   Home care  Lifestyle changes can help reduce symptoms. If needed, medicines may be prescribed. Symptoms often improve with treatment, but if treatment is stopped, the symptoms often return after a few months. So most persons with GERD will need to continue treatment.  Lifestyle changes  · Limit or avoid fatty, fried, and spicy foods, as well as coffee, chocolate, mint, and foods with high acid content such as tomatoes and citrus fruit and juices (orange, grapefruit, lemon).  · Dont eat large meals, especially at night. Frequent, smaller meals are best. Do not lie down right after eating. And dont eat anything 3 hours before going to bed.  · Avoid drinking alcohol and smoking. As much as possible, stay away from second hand smoke.  · If you are overweight, losing weight will reduce symptoms.   · Avoid wearing tight clothing around your stomach area.  · If your symptoms occur during sleep, use a foam wedge to elevate your upper body (not just your head.) Or, place 4" blocks under the head of your bed.  Medicines  If needed, medicines can help relieve the symptoms of GERD and prevent damage to the esophagus. Discuss a medicine plan with your healthcare provider. This may include one or more of the following medicines:  · Antacids to help neutralize the normal acids in your stomach.  · Acid blockers (H2 blockers) to decrease acid production.  · Acid inhibitors (PPIs) to decrease acid production in a different way than the blockers. They may work better, but can take a little longer to take effect.  Take an antacid 30-60 minutes after eating and at bedtime, but not at the same time as an acid blocker.  Try not to take medicines such as ibuprofen and aspirin. If you are taking " aspirin for your heart or other medical reasons, talk to your healthcare provider about stopping it.  Follow-up care  Follow up with your healthcare provider or as advised by our staff.  When to seek medical advice  Call your healthcare provider if any of the following occur:  · Stomach pain gets worse or moves to the lower right abdomen (appendix area)  · Chest pain appears or gets worse, or spreads to the back, neck, shoulder, or arm  · Frequent vomiting (cant keep down liquids)  · Blood in the stool or vomit (red or black in color)  · Feeling weak or dizzy  · Fever of 100.4ºF (38ºC) or higher, or as directed by your healthcare provider  Date Last Reviewed: 6/23/2015  © 3281-8515 [employer]. [address] [place] [address], [place] [address]. All rights reserved. This information is not intended as a substitute for professional medical care. Always follow your healthcare professional's instructions.

## 2019-01-30 PROBLEM — K21.9 GASTROESOPHAGEAL REFLUX DISEASE WITHOUT ESOPHAGITIS: Status: ACTIVE | Noted: 2019-01-30

## 2019-01-30 PROBLEM — F32.5 MAJOR DEPRESSIVE DISORDER WITH SINGLE EPISODE, IN FULL REMISSION: Status: ACTIVE | Noted: 2019-01-30

## 2019-02-04 ENCOUNTER — TELEPHONE (OUTPATIENT)
Dept: UROLOGY | Facility: CLINIC | Age: 64
End: 2019-02-04

## 2019-02-04 NOTE — TELEPHONE ENCOUNTER
----- Message from Zayda Thomas RT sent at 2/4/2019  2:10 PM CST -----  Contact: pt    pt , requesting to know if she needs any testing done prior to her appt of 03/28/2019, thanks.

## 2019-02-04 NOTE — TELEPHONE ENCOUNTER
Returned call, patient would like to know if she should go ahead and have a repeat US done before her follow up appt. Informed I will give the message to the MD to advise, patient verbally understood.

## 2019-02-05 ENCOUNTER — TELEPHONE (OUTPATIENT)
Dept: UROLOGY | Facility: CLINIC | Age: 64
End: 2019-02-05

## 2019-02-05 DIAGNOSIS — N28.1 RENAL CYST, RIGHT: Primary | ICD-10-CM

## 2019-02-05 NOTE — TELEPHONE ENCOUNTER
Call placed to inform the patient that the MD does recommend she do a repeat US before her follow up appt. Called to schedule the US, no answer, message left with call back number.

## 2019-03-28 ENCOUNTER — TELEPHONE (OUTPATIENT)
Dept: UROLOGY | Facility: CLINIC | Age: 64
End: 2019-03-28

## 2019-03-28 ENCOUNTER — HOSPITAL ENCOUNTER (OUTPATIENT)
Dept: RADIOLOGY | Facility: CLINIC | Age: 64
Discharge: HOME | End: 2019-03-28
Attending: UROLOGY
Payer: COMMERCIAL

## 2019-03-28 DIAGNOSIS — N28.1 RENAL CYST, RIGHT: ICD-10-CM

## 2019-03-28 PROCEDURE — 76770 US EXAM ABDO BACK WALL COMP: CPT | Mod: 26,,, | Performed by: RADIOLOGY

## 2019-03-28 PROCEDURE — 76770 US RETROPERITONEAL COMPLETE: ICD-10-PCS | Mod: 26,,, | Performed by: RADIOLOGY

## 2019-03-28 PROCEDURE — 76770 US EXAM ABDO BACK WALL COMP: CPT | Mod: TC,PO

## 2019-03-28 NOTE — TELEPHONE ENCOUNTER
----- Message from Rhoda Kennedy MD sent at 3/28/2019  1:19 PM CDT -----  Renal ultrasound on 03/28/2019 reveals stable appearance of the renal cysts    Rec:  Continue with routine follow-up appointment

## 2019-04-03 ENCOUNTER — TELEPHONE (OUTPATIENT)
Dept: UROLOGY | Facility: CLINIC | Age: 64
End: 2019-04-03

## 2019-04-03 NOTE — TELEPHONE ENCOUNTER
----- Message from Alyssa Oliver sent at 4/3/2019 12:35 PM CDT -----  Contact: Patient  Type:  Test Results    Who Called:  Patient  Name of Test (Lab/Mammo/Etc):  Ultrasound  Date of Test:  3/28  Ordering Provider:  Daftary  Where the test was performed:  Clinic  Best Call Back Number:    Additional Information:

## 2019-04-03 NOTE — TELEPHONE ENCOUNTER
Returned call, no answer, unable to leave message as the mailbox is full, will attempt again later.

## 2019-04-04 ENCOUNTER — TELEPHONE (OUTPATIENT)
Dept: UROLOGY | Facility: CLINIC | Age: 64
End: 2019-04-04

## 2019-05-15 ENCOUNTER — TELEPHONE (OUTPATIENT)
Dept: FAMILY MEDICINE | Facility: CLINIC | Age: 64
End: 2019-05-15

## 2019-05-15 ENCOUNTER — OFFICE VISIT (OUTPATIENT)
Dept: FAMILY MEDICINE | Facility: CLINIC | Age: 64
End: 2019-05-15
Payer: COMMERCIAL

## 2019-05-15 VITALS
DIASTOLIC BLOOD PRESSURE: 84 MMHG | TEMPERATURE: 98 F | WEIGHT: 167.31 LBS | BODY MASS INDEX: 28.56 KG/M2 | HEART RATE: 79 BPM | SYSTOLIC BLOOD PRESSURE: 134 MMHG | HEIGHT: 64 IN

## 2019-05-15 DIAGNOSIS — H10.9 BACTERIAL CONJUNCTIVITIS OF LEFT EYE: Primary | ICD-10-CM

## 2019-05-15 PROCEDURE — 99213 OFFICE O/P EST LOW 20 MIN: CPT | Mod: S$GLB,,, | Performed by: NURSE PRACTITIONER

## 2019-05-15 PROCEDURE — 99213 PR OFFICE/OUTPT VISIT, EST, LEVL III, 20-29 MIN: ICD-10-PCS | Mod: S$GLB,,, | Performed by: NURSE PRACTITIONER

## 2019-05-15 PROCEDURE — 99999 PR PBB SHADOW E&M-EST. PATIENT-LVL III: ICD-10-PCS | Mod: PBBFAC,,, | Performed by: NURSE PRACTITIONER

## 2019-05-15 PROCEDURE — 99999 PR PBB SHADOW E&M-EST. PATIENT-LVL III: CPT | Mod: PBBFAC,,, | Performed by: NURSE PRACTITIONER

## 2019-05-15 RX ORDER — POLYMYXIN B SULFATE AND TRIMETHOPRIM 1; 10000 MG/ML; [USP'U]/ML
1 SOLUTION OPHTHALMIC 4 TIMES DAILY
Qty: 10 ML | Refills: 0 | Status: CANCELLED | OUTPATIENT
Start: 2019-05-15 | End: 2019-05-22

## 2019-05-15 RX ORDER — POLYMYXIN B SULFATE AND TRIMETHOPRIM 1; 10000 MG/ML; [USP'U]/ML
1 SOLUTION OPHTHALMIC 4 TIMES DAILY
Qty: 10 ML | Refills: 0 | Status: SHIPPED | OUTPATIENT
Start: 2019-05-15 | End: 2019-05-22

## 2019-05-15 NOTE — TELEPHONE ENCOUNTER
Called patient- offered appt-patient declined stating she can't get off work. She will go to after hours clinic.

## 2019-05-15 NOTE — PROGRESS NOTES
Subjective:       Patient ID: Gaby Donohue is a 64 y.o. female.    Chief Complaint: Conjunctivitis (left )    Ms. Donohue presents today with concerns of conjunctivitis of her left eye. Granddaughter has had similar symptoms that have been present for a few days. Symptoms include purulent drainage, irritation, redness. Only present in the left eye. Symptoms started this morning. Recently got a enw mascara, but has been using on both eyes.     Patient Active Problem List   Diagnosis    Hyperlipidemia    Major depressive disorder with single episode, in full remission    Gastroesophageal reflux disease without esophagitis    BMI 28.0-28.9,adult     Review of Systems   Constitutional: Negative for activity change and fever.   HENT: Negative for congestion, rhinorrhea, sinus pressure, sinus pain and sore throat.    Eyes: Positive for pain, discharge and redness. Negative for photophobia, itching and visual disturbance.   Respiratory: Negative for cough.      Dictation #1  MRN:[id number]  CSN:[id number]     Objective:      Physical Exam   Constitutional: She is oriented to person, place, and time. She appears well-developed and well-nourished.   Eyes: Pupils are equal, round, and reactive to light. Left eye exhibits discharge and exudate. Left conjunctiva is injected.   Cardiovascular: Normal rate, regular rhythm and normal heart sounds.   Pulmonary/Chest: Effort normal and breath sounds normal.   Musculoskeletal: She exhibits no edema.   Neurological: She is oriented to person, place, and time.   Skin: Skin is warm and dry.   Psychiatric: She has a normal mood and affect.   Nursing note and vitals reviewed.      Assessment:       1. Bacterial conjunctivitis of left eye        Plan:       Gaby was seen today for conjunctivitis.    Diagnoses and all orders for this visit:    Bacterial conjunctivitis of left eye  -     polymyxin B sulf-trimethoprim (POLYTRIM) 10,000 unit- 1 mg/mL Drop; Place 1 drop into the left eye 4  (four) times daily. for 7 days  RTC if symptoms fail to improve or become worse

## 2019-05-15 NOTE — TELEPHONE ENCOUNTER
----- Message from Darby White sent at 5/15/2019  3:14 PM CDT -----  Contact: pt  Calling in regards to be worked into the schedule today for pink eye and please advise and ask for emmanuelle at [phone] (home)

## 2019-05-15 NOTE — TELEPHONE ENCOUNTER
----- Message from Bharat Medina sent at 5/15/2019  2:06 PM CDT -----  Contact: Patient  Type: Needs Medical Advice    Who Called:  Patient  Best Call Back Number: [phone]  Additional Information: Patient states they believe they have pink eye in their left eye. Please call to advise. Thanks!

## 2019-08-23 ENCOUNTER — TELEPHONE (OUTPATIENT)
Dept: FAMILY MEDICINE | Facility: CLINIC | Age: 64
End: 2019-08-23

## 2019-08-23 DIAGNOSIS — F41.8 SITUATIONAL ANXIETY: ICD-10-CM

## 2019-08-23 RX ORDER — ALPRAZOLAM 0.25 MG/1
0.25 TABLET ORAL 3 TIMES DAILY PRN
Qty: 20 TABLET | Refills: 0 | Status: SHIPPED | OUTPATIENT
Start: 2019-08-23 | End: 2022-05-23 | Stop reason: SDUPTHER

## 2019-08-23 NOTE — TELEPHONE ENCOUNTER
The  (Prescription Monitoring Program) website was checked with no inappropriate activity found.    done

## 2019-08-23 NOTE — TELEPHONE ENCOUNTER
Patient called in- daughter surprised her with a 2 week trip to [place]. Asking if she can get Xanax sent to [employer] for the flight. She has anxiety flying.

## 2019-11-19 ENCOUNTER — TELEPHONE (OUTPATIENT)
Dept: FAMILY MEDICINE | Facility: CLINIC | Age: 64
End: 2019-11-19

## 2019-11-19 NOTE — TELEPHONE ENCOUNTER
Patient is needing a FMLA form filled out due to her daughter having mental problems. She thinks the daughters physician will fill it out. If not she will let me know and I can check with Dr. Garnica about it.

## 2019-11-19 NOTE — TELEPHONE ENCOUNTER
----- Message from Princess AYAZ Banerjee sent at 11/19/2019  1:17 PM CST -----  Contact: patient  Type: Needs Medical Advice    Who Called:  Patient  Best Call Back Number:   Additional Information: Patient is requesting a call back in regards to getting some information on a form she has to fill out for work.

## 2019-11-25 ENCOUNTER — TELEPHONE (OUTPATIENT)
Dept: FAMILY MEDICINE | Facility: CLINIC | Age: 64
End: 2019-11-25

## 2019-11-25 NOTE — TELEPHONE ENCOUNTER
----- Message from Bharat Medina sent at 11/25/2019 10:38 AM CST -----  Contact: Patient  Type: Needs Medical Advice    Who Called:  Patient  Best Call Back Number: [phone]  Additional Information: Caller would like to discuss medical form. Please call to advise. Thanks!

## 2019-11-25 NOTE — TELEPHONE ENCOUNTER
The patient's physician has to complete the form.  It requires detailed information on the condition and treatment.  I cannot fill it out.  The daughter's treating physician has to.

## 2019-11-25 NOTE — TELEPHONE ENCOUNTER
Patients daughter has mental health problems- patient has to take time off to take care of her sporadically. Asking if Dr. Garnica will fill out fmla form.

## 2020-02-11 DIAGNOSIS — F32.5 MAJOR DEPRESSIVE DISORDER WITH SINGLE EPISODE, IN FULL REMISSION: ICD-10-CM

## 2020-02-11 RX ORDER — DULOXETIN HYDROCHLORIDE 60 MG/1
CAPSULE, DELAYED RELEASE ORAL
Qty: 30 CAPSULE | Refills: 2 | Status: SHIPPED | OUTPATIENT
Start: 2020-02-11 | End: 2020-05-18

## 2020-02-12 NOTE — TELEPHONE ENCOUNTER
Of all the people she has ever seen here, I am the only one left and I have not seen her in over two years.  She has no upcoming appointments.  I would suggest she schedule one.

## 2020-02-14 DIAGNOSIS — K21.9 GASTROESOPHAGEAL REFLUX DISEASE WITHOUT ESOPHAGITIS: ICD-10-CM

## 2020-02-14 RX ORDER — LANSOPRAZOLE 30 MG/1
30 CAPSULE, DELAYED RELEASE ORAL DAILY
Qty: 90 CAPSULE | Refills: 0 | Status: SHIPPED | OUTPATIENT
Start: 2020-02-14 | End: 2020-05-18

## 2020-02-14 NOTE — TELEPHONE ENCOUNTER
----- Message from Ajit James sent at 2/14/2020  3:25 PM CST -----  Contact: pt  Type:  RX Refill Request    Who Called:  pt  Refill or New Rx:  refill  RX Name and Strength:  lansoprazole (PREVACID) 30 MG capsule  How is the patient currently taking it? (ex. 1XDay):  1 x day  Is this a 30 day or 90 day RX:  90  Preferred Pharmacy with phone number:    [id number] - [place], [place] - [address] [address]  Phone: [phone] Fax: [phone]    Local or Mail Order:    Ordering Provider:    Best Call Back Number:  [phone]  Additional Information:  Pt is requesting this to be called in today. Pt is out at this time.

## 2020-03-02 ENCOUNTER — OFFICE VISIT (OUTPATIENT)
Dept: FAMILY MEDICINE | Facility: CLINIC | Age: 65
End: 2020-03-02
Payer: COMMERCIAL

## 2020-03-02 VITALS
HEART RATE: 90 BPM | OXYGEN SATURATION: 98 % | WEIGHT: 153.88 LBS | TEMPERATURE: 99 F | DIASTOLIC BLOOD PRESSURE: 68 MMHG | HEIGHT: 64 IN | SYSTOLIC BLOOD PRESSURE: 110 MMHG | BODY MASS INDEX: 26.27 KG/M2

## 2020-03-02 DIAGNOSIS — R68.89 FLU-LIKE SYMPTOMS: Primary | ICD-10-CM

## 2020-03-02 LAB
INFLUENZA A, MOLECULAR: POSITIVE
INFLUENZA B, MOLECULAR: NEGATIVE
SPECIMEN SOURCE: ABNORMAL

## 2020-03-02 PROCEDURE — 99214 PR OFFICE/OUTPT VISIT, EST, LEVL IV, 30-39 MIN: ICD-10-PCS | Mod: S$GLB,,, | Performed by: NURSE PRACTITIONER

## 2020-03-02 PROCEDURE — 99999 PR PBB SHADOW E&M-EST. PATIENT-LVL III: CPT | Mod: PBBFAC,,, | Performed by: NURSE PRACTITIONER

## 2020-03-02 PROCEDURE — 99999 PR PBB SHADOW E&M-EST. PATIENT-LVL III: ICD-10-PCS | Mod: PBBFAC,,, | Performed by: NURSE PRACTITIONER

## 2020-03-02 PROCEDURE — 99214 OFFICE O/P EST MOD 30 MIN: CPT | Mod: S$GLB,,, | Performed by: NURSE PRACTITIONER

## 2020-03-02 PROCEDURE — 87502 INFLUENZA DNA AMP PROBE: CPT | Mod: PO

## 2020-03-02 RX ORDER — OSELTAMIVIR PHOSPHATE 75 MG/1
75 CAPSULE ORAL 2 TIMES DAILY
Qty: 10 CAPSULE | Refills: 0 | Status: SHIPPED | OUTPATIENT
Start: 2020-03-02 | End: 2020-03-07

## 2020-03-02 NOTE — PROGRESS NOTES
This dictation has been generated using Modal [employer]ency Dictation some phonetic errors may occur. Please contact author for clarification if needed.     Problem List Items Addressed This Visit     None      Visit Diagnoses     Flu-like symptoms    -  Primary    Relevant Orders    Influenza A & B by Molecular (Completed)        Orders Placed This Encounter    Influenza A & B by Molecular    oseltamivir (TAMIFLU) 75 MG capsule     Flu-like symptoms less than 48 hr treat with Tamiflu as above.  Flu a positive.  Discussed 's illness and daughter and granddaughter.  Discussed proper strategies to avoid spreading however patient has been ill for 2 days.    No follow-ups on file.    ________________________________________________________________  ________________________________________________________________      Chief Complaint   Patient presents with    Generalized Body Aches    Weakness     History of present illness  This 64 y.o. presents today for complaint of cough cold flu-like symptoms.  Saturday she felt tired and Saturday night she started having fever chills body aches.  She notes coughing up brown sputum.  She has been sneezing and had runny nose with clear sputum.  She does feel weak.  She does note she had the flu shot October 15th.  Patient tried DayQuil and NyQuil without control of symptoms except improvement in cough.  Her  is currently ill but has been sick for approximately 4 days.  Review of systems  No rash or itching  No chest pain or shortness of breath  No nausea vomiting.  Notes soft stools.    Past medical social surgical history reviewed.  Patient new to me.  Follows with in the clinic    Past Medical History:   Diagnosis Date    Anxiety     Cancer     skin ca on face    Depression     Hyperlipidemia     Wears glasses        Past Surgical History:   Procedure Laterality Date    CHOLECYSTECTOMY      COLONOSCOPY  4/20/12    Dr. Jaime, 5 year recheck    FRACTURE SURGERY   2013    left arm Dr Cooper    laporoscopy         Family History   Problem Relation Age of Onset    COPD Mother     Emphysema Mother     Heart disease Father     Cancer Sister         skin cancer face    Cancer Brother         skin cancer face     No Known Problems Daughter     Cancer Sister         skin cancer face    Cancer Sister         skin cancer face     No Known Problems Brother     No Known Problems Daughter        Social History     Socioeconomic History    Marital status:      Spouse name: Not on file    Number of children: Not on file    Years of education: Not on file    Highest education level: Not on file   Occupational History    Not on file   Social Needs    Financial resource strain: Not on file    Food insecurity:     Worry: Not on file     Inability: Not on file    Transportation needs:     Medical: Not on file     Non-medical: Not on file   Tobacco Use    Smoking status: Former Smoker     Packs/day: 1.50     Years: 21.00     Pack years: 31.50     Last attempt to quit: 1984     Years since quittin.8    Smokeless tobacco: Never Used   Substance and Sexual Activity    Alcohol use: Yes     Comment: occ    Drug use: No    Sexual activity: Yes     Partners: Male   Lifestyle    Physical activity:     Days per week: Not on file     Minutes per session: Not on file    Stress: Not on file   Relationships    Social connections:     Talks on phone: Not on file     Gets together: Not on file     Attends [employer] service: Not on file     Active member of club or organization: Not on file     Attends meetings of clubs or organizations: Not on file     Relationship status: Not on file   Other Topics Concern    Not on file   Social History Narrative    Not on file       Current Outpatient Medications   Medication Sig Dispense Refill    DULoxetine (CYMBALTA) 60 MG capsule TAKE ONE CAPSULE BY MOUTH ONCE DAILY 30 capsule 2    lansoprazole (PREVACID) 30 MG  capsule Take 1 capsule (30 mg total) by mouth once daily. 90 capsule 0    ALPRAZolam (XANAX) 0.25 MG tablet Take 1 tablet (0.25 mg total) by mouth 3 (three) times daily as needed (anxiety regarding flying). 20 tablet 0    famotidine (PEPCID AC) 20 MG tablet Take 1 tablet (20 mg total) by mouth 2 (two) times daily. As needed before meal that may cause acid reflux/heart burn 60 tablet 11    oseltamivir (TAMIFLU) 75 MG capsule Take 1 capsule (75 mg total) by mouth 2 (two) times daily. for 5 days 10 capsule 0     No current facility-administered medications for this visit.        Review of patient's allergies indicates:   Allergen Reactions    No known drug allergies        Physical examination  Vitals Reviewed  Gen. Well-dressed well-nourished .  Patient looks sick not septic  Skin warm dry and intact.  No rashes noted.  HEENT.  TM intact bilateral with normal light reflex.  No mastoid tenderness during percussion.  Nares patent bilateral.  Pharynx is unremarkable.  No maxillary or frontal sinus tenderness when percussed.    Neck is supple without adenopathy  Chest.  Respirations are even unlabored.  Lungs are clear to auscultation.  Cardiac regular rate and rhythm.  No chest wall adenopathy noted.  Neuro. Awake alert oriented x4.  Normal judgment and cognition noted.  Extremities no clubbing cyanosis or edema noted.     Call or return to clinic prn if these symptoms worsen or fail to improve as anticipated.

## 2020-03-09 ENCOUNTER — TELEPHONE (OUTPATIENT)
Dept: FAMILY MEDICINE | Facility: CLINIC | Age: 65
End: 2020-03-09

## 2020-03-09 NOTE — TELEPHONE ENCOUNTER
----- Message from Chitra Love sent at 3/9/2020  8:34 AM CDT -----  Contact: Gaby krueger  Type: Needs Medical Advice    Who Called:  Gaby Brantley Call Back Number: [phone]  Additional Information: This is her first day back at work. She may need a work excuse. Pls call pt regarding

## 2020-03-09 NOTE — TELEPHONE ENCOUNTER
----- Message from Maximus Duque sent at 3/9/2020 11:06 AM CDT -----  Contact: patient  Type:  Patient Returning Call    Who Called:  patient  Who Left Message for Patient:  marija  Does the patient know what this is regarding?:  yes  Best Call Back Number:  [phone]  Additional Information:  requesting a call back,place call to pod

## 2020-03-09 NOTE — LETTER
March 9, 2020      [facility]  [address]  [place] [address]  Phone: [phone]  Fax: [phone]       Patient: Gaby Donohue   YOB: 1955  Date of Visit: 03/09/2020    To Whom It May Concern:    Armando Donohue  was at [facility] on 3/2/2020. Please excuse for 3/2-3/6.  She may return to work on 3/9/2020 with no restrictions. If you have any questions or concerns, or if I can be of further assistance, please do not hesitate to contact me.    Sincerely,    Marly Mendoza LPN

## 2020-03-09 NOTE — TELEPHONE ENCOUNTER
Patient will  return to work note-she was out of office with the flu 3/2-3/6-returned to work 3/9/2020.

## 2020-04-07 ENCOUNTER — DOCUMENTATION ONLY (OUTPATIENT)
Dept: FAMILY MEDICINE | Facility: CLINIC | Age: 65
End: 2020-04-07

## 2020-04-07 NOTE — PROGRESS NOTES
Pre-Visit Chart Review  For Appointment Scheduled on 4-13-20    Health Maintenance Due   Topic Date Due    TETANUS VACCINE  03/19/1973    DEXA SCAN  03/19/1995    Colonoscopy  04/20/2017    Pneumococcal Vaccine (65+ Low/Medium Risk) (1 of 2 - PCV13) 03/19/2020    Mammogram  04/09/2020

## 2020-05-16 DIAGNOSIS — K21.9 GASTROESOPHAGEAL REFLUX DISEASE WITHOUT ESOPHAGITIS: ICD-10-CM

## 2020-05-16 DIAGNOSIS — F32.5 MAJOR DEPRESSIVE DISORDER WITH SINGLE EPISODE, IN FULL REMISSION: ICD-10-CM

## 2020-05-18 RX ORDER — LANSOPRAZOLE 30 MG/1
30 CAPSULE, DELAYED RELEASE ORAL DAILY
Qty: 90 CAPSULE | Refills: 0 | Status: SHIPPED | OUTPATIENT
Start: 2020-05-18 | End: 2020-06-15 | Stop reason: SDUPTHER

## 2020-05-18 RX ORDER — DULOXETIN HYDROCHLORIDE 60 MG/1
CAPSULE, DELAYED RELEASE ORAL
Qty: 30 CAPSULE | Refills: 2 | Status: SHIPPED | OUTPATIENT
Start: 2020-05-18 | End: 2020-06-15

## 2020-06-01 ENCOUNTER — PATIENT OUTREACH (OUTPATIENT)
Dept: ADMINISTRATIVE | Facility: HOSPITAL | Age: 65
End: 2020-06-01

## 2020-06-01 DIAGNOSIS — Z12.31 SCREENING MAMMOGRAM, ENCOUNTER FOR: Primary | ICD-10-CM

## 2020-06-01 DIAGNOSIS — Z78.0 ASYMPTOMATIC MENOPAUSAL STATE: ICD-10-CM

## 2020-06-01 NOTE — LETTER
AUTHORIZATION FOR RELEASE OF   CONFIDENTIAL INFORMATION    Dear [facility],    We are seeing Gaby Donohue, date of birth 1955, in the clinic at [facility]. Kenn Garnica MD is the patient's PCP. Gaby Donohue has an outstanding lab/procedure at the time we reviewed her chart. In order to help keep her health information updated, she has authorized us to request the following medical record(s):        ( X )  MAMMOGRAM                                      (  )  COLONOSCOPY      (  )  PAP SMEAR                                          (  )  OUTSIDE LAB RESULTS     ( X )  DEXA SCAN                                          (  )  EYE EXAM            (  )  FOOT EXAM                                          (  )  ENTIRE RECORD     (  )  OUTSIDE IMMUNIZATIONS                 (  )  _______________         Please fax records to [facility], Robert W Taylor, MD, [phone]    Jina Hurst LPN  Clinical Care Coordinator  [address] [address]  P: [phone]  F: [phone]            Patient Name: Gaby Donohue  : 1955  Patient Phone #: [phone]

## 2020-06-01 NOTE — PROGRESS NOTES
Chart review completed 06/01/2020.  Care Everywhere updates requested and reviewed.  Immunizations reconciled. Media reviewed.     DIS, Lab earnest, and quest reviewed    WOG orders placed. MAMMO, DEXA  Requested MAMMO, DEXA  records ([facility], DR. CRESPO)    LETTER REMINDER MAILED

## 2020-06-01 NOTE — LETTER
AUTHORIZATION FOR RELEASE OF   CONFIDENTIAL INFORMATION    Dear DR. CRESPO,    We are seeing Gaby Donohue, date of birth 1955, in the clinic at [facility]. Kenn Garnica MD is the patient's PCP. Gaby Donohue has an outstanding lab/procedure at the time we reviewed her chart. In order to help keep her health information updated, she has authorized us to request the following medical record(s):        ( X )  MAMMOGRAM                                      (  )  COLONOSCOPY      (  )  PAP SMEAR                                          (  )  OUTSIDE LAB RESULTS     ( X )  DEXA SCAN                                          (  )  EYE EXAM            (  )  FOOT EXAM                                          (  )  ENTIRE RECORD     (  )  OUTSIDE IMMUNIZATIONS                 (  )  _______________         Please fax records to [facility], Robert W Taylor, MD, [phone]    Jina Hurst LPN  Clinical Care Coordinator  [address] [address]  P: [phone]  F: [phone]            Patient Name: Gaby Donohue  : 1955  Patient Phone #: [phone]

## 2020-06-02 ENCOUNTER — PATIENT OUTREACH (OUTPATIENT)
Dept: ADMINISTRATIVE | Facility: HOSPITAL | Age: 65
End: 2020-06-02

## 2020-06-08 ENCOUNTER — TELEPHONE (OUTPATIENT)
Dept: FAMILY MEDICINE | Facility: CLINIC | Age: 65
End: 2020-06-08

## 2020-06-08 NOTE — TELEPHONE ENCOUNTER
----- Message from Gomez Borrego sent at 6/8/2020 11:34 AM CDT -----  Contact: pt  Type: Needs Medical Advice    Who Called:  Pt    Best Call Back Number: [phone]  Additional Information: pt states is third time trying to get callback to see if can get in person visit switched to VV next week b/c has gone back to work. Please call to discuss.

## 2020-06-08 NOTE — TELEPHONE ENCOUNTER
Called patient- no previous message sent to me. Patient advised  cannot do her physical as virtual visit.

## 2020-06-10 ENCOUNTER — DOCUMENTATION ONLY (OUTPATIENT)
Dept: FAMILY MEDICINE | Facility: CLINIC | Age: 65
End: 2020-06-10

## 2020-06-10 NOTE — PROGRESS NOTES
Pre-Visit Chart Review  For Appointment Scheduled on 6-15-20    Health Maintenance Due   Topic Date Due    TETANUS VACCINE  10/13/2015    Colonoscopy  04/20/2017    Pneumococcal Vaccine (65+ Low/Medium Risk) (1 of 2 - PCV13) 03/19/2020    Mammogram  04/09/2020

## 2020-06-15 ENCOUNTER — OFFICE VISIT (OUTPATIENT)
Dept: FAMILY MEDICINE | Facility: CLINIC | Age: 65
End: 2020-06-15
Attending: FAMILY MEDICINE
Payer: COMMERCIAL

## 2020-06-15 VITALS
SYSTOLIC BLOOD PRESSURE: 142 MMHG | RESPIRATION RATE: 20 BRPM | HEART RATE: 78 BPM | OXYGEN SATURATION: 96 % | BODY MASS INDEX: 27.89 KG/M2 | DIASTOLIC BLOOD PRESSURE: 80 MMHG | WEIGHT: 163.38 LBS | TEMPERATURE: 98 F | HEIGHT: 64 IN

## 2020-06-15 DIAGNOSIS — R03.0 ELEVATED BLOOD PRESSURE READING IN OFFICE WITHOUT DIAGNOSIS OF HYPERTENSION: ICD-10-CM

## 2020-06-15 DIAGNOSIS — F32.5 MAJOR DEPRESSIVE DISORDER WITH SINGLE EPISODE, IN FULL REMISSION: ICD-10-CM

## 2020-06-15 DIAGNOSIS — E78.5 HYPERLIPIDEMIA, UNSPECIFIED HYPERLIPIDEMIA TYPE: ICD-10-CM

## 2020-06-15 DIAGNOSIS — Z00.00 ENCOUNTER FOR PREVENTIVE HEALTH EXAMINATION: Primary | ICD-10-CM

## 2020-06-15 DIAGNOSIS — K21.9 GASTROESOPHAGEAL REFLUX DISEASE WITHOUT ESOPHAGITIS: ICD-10-CM

## 2020-06-15 PROCEDURE — 99397 PR PREVENTIVE VISIT,EST,65 & OVER: ICD-10-PCS | Mod: S$GLB,,, | Performed by: FAMILY MEDICINE

## 2020-06-15 PROCEDURE — 99999 PR PBB SHADOW E&M-EST. PATIENT-LVL IV: CPT | Mod: PBBFAC,,, | Performed by: FAMILY MEDICINE

## 2020-06-15 PROCEDURE — 99397 PER PM REEVAL EST PAT 65+ YR: CPT | Mod: S$GLB,,, | Performed by: FAMILY MEDICINE

## 2020-06-15 PROCEDURE — 99999 PR PBB SHADOW E&M-EST. PATIENT-LVL IV: ICD-10-PCS | Mod: PBBFAC,,, | Performed by: FAMILY MEDICINE

## 2020-06-15 RX ORDER — DULOXETIN HYDROCHLORIDE 30 MG/1
90 CAPSULE, DELAYED RELEASE ORAL DAILY
Qty: 270 CAPSULE | Refills: 1 | Status: SHIPPED | OUTPATIENT
Start: 2020-06-15 | End: 2021-01-05

## 2020-06-15 RX ORDER — LANSOPRAZOLE 30 MG/1
30 CAPSULE, DELAYED RELEASE ORAL DAILY
Qty: 90 CAPSULE | Refills: 3 | Status: SHIPPED | OUTPATIENT
Start: 2020-06-15 | End: 2020-11-24 | Stop reason: ALTCHOICE

## 2020-06-15 NOTE — PATIENT INSTRUCTIONS
Controlling High Blood Pressure  High blood pressure (hypertension) is often called the silent killer. This is because many people who have it dont know it. High blood pressure is defined as 140/90 mm Hg or higher. Know your blood pressure and remember to check it regularly. Doing so can save your life. Here are some things you can do to help control your blood pressure.    Choose heart-healthy foods  · Select low-salt, low-fat foods. Limit sodium intake to 2,400 mg per day or the amount suggested by your healthcare provider.  · Limit canned, dried, cured, packaged, and fast foods. These can contain a lot of salt.  · Eat 8 to 10 servings of fruits and vegetables every day.  · Choose lean meats, fish, or chicken.  · Eat whole-grain pasta, brown rice, and beans.  · Eat 2 to 3 servings of low-fat or fat-free dairy products.  · Ask your doctor about the DASH eating plan. This plan helps reduce blood pressure.  · When you go to a restaurant, ask that your meal be prepared with no added salt.  Maintain a healthy weight  · Ask your healthcare provider how many calories to eat a day. Then stick to that number.  · Ask your healthcare provider what weight range is healthiest for you. If you are overweight, a weight loss of only 3% to 5% of your body weight can help lower blood pressure. Generally, a good weight loss goal is to lose 10% of your body weight in a year.  · Limit snacks and sweets.  · Get regular exercise.  Get up and get active  · Choose activities you enjoy. Find ones you can do with friends or family. This includes bicycling, dancing, walking, and jogging.  · Park farther away from building entrances.  · Use stairs instead of the elevator.  · When you can, walk or bike instead of driving.  · [place] leaves, garden, or do household repairs.  · Be active at a moderate to vigorous level of physical activity for at least 40 minutes for a minimum of 3 to 4 days a week.   Manage stress  · Make time to relax and enjoy  life. Find time to laugh.  · Communicate your concerns with your loved ones and your healthcare provider.  · Visit with family and friends, and keep up with hobbies.  Limit alcohol and quit smoking  · Men should have no more than 2 drinks per day.  · Women should have no more than 1 drink per day.  · Talk with your healthcare provider about quitting smoking. Smoking significantly increases your risk for heart disease and stroke. Ask your healthcare provider about community smoking cessation programs and other options.  Medicines  If lifestyle changes arent enough, your healthcare provider may prescribe high blood pressure medicine. Take all medicines as prescribed. If you have any questions about your medicines, ask your healthcare provider before stopping or changing them.   Date Last Reviewed: 4/27/2016  © 8415-3786 [employer]. [address] [place] [address], [place] [address]. All rights reserved. This information is not intended as a substitute for professional medical care. Always follow your healthcare professional's instructions.

## 2020-06-15 NOTE — PROGRESS NOTES
Subjective:       Patient ID: Gaby Donohue is a 65 y.o. female.    Chief Complaint: Annual Exam    65-year-old female coming in for a physical exam.  I have not seen her in almost three years.  She has plans for some upcoming dental work in the near future but is putting it off until a reduction in the rate of COVID 19.  She has several extractions, post an implants to do.  She is otherwise doing well.  She was working from home for a period of time during which she gained some weight but she is now back at work and feeling better and more physically active.  Her blood pressure is noted to be mildly elevated.  She has not checked it lately at home but she does have a cuff and will check that several times over the next two weeks and then call results to me.  She has a history of anxiety in major depression on Cymbalta 60 mg.  She would like to increase the dose of Cymbalta slightly due to increased symptoms related to the stress of the COVID-19 epidemic and her changes with work.  She has additional history of hyperlipidemia and reflux which is well controlled as long as she sticks to her diet.  She is due for a colonoscopy but she declines and she is due for a Prevnar 13 which she declines.    Past Medical History:  No date: Anxiety  No date: Cancer      Comment:  skin ca on face  No date: Depression  No date: Hyperlipidemia  No date: Wears glasses h    Past Surgical History:  No date: CHOLECYSTECTOMY  4/20/12: COLONOSCOPY      Comment:  Dr. Jaime, 5 year recheck  11/02/2013: FRACTURE SURGERY      Comment:  left arm Dr Cooper  No date: laporoscopy    Review of patient's family history indicates:  Problem: COPD      Relation: Mother          Age of Onset: (Not Specified)  Problem: Emphysema      Relation: Mother          Age of Onset: (Not Specified)  Problem: Heart disease      Relation: Father          Age of Onset: (Not Specified)  Problem: Cancer      Relation: Sister          Age of Onset: (Not  Specified)          Comment: skin cancer face  Problem: Cancer      Relation: Brother          Age of Onset: (Not Specified)          Comment: skin cancer face   Problem: No Known Problems      Relation: Daughter          Age of Onset: (Not Specified)  Problem: Cancer      Relation: Sister          Age of Onset: (Not Specified)          Comment: skin cancer face  Problem: Cancer      Relation: Sister          Age of Onset: (Not Specified)          Comment: skin cancer face   Problem: No Known Problems      Relation: Brother          Age of Onset: (Not Specified)  Problem: No Known Problems      Relation: Daughter          Age of Onset: (Not Specified)    Social History    Tobacco Use      Smoking status: Former Smoker        Packs/day: 1.50        Years: 21.00        Pack years: 31.5        Quit date: 1984        Years since quittin.1      Smokeless tobacco: Never Used    Alcohol use: Yes      Comment: occ    Drug use: No    Current Outpatient Medications on File Prior to Visit:  (DISCONTINUED) DULoxetine (CYMBALTA) 60 MG capsule, TAKE ONE CAPSULE BY MOUTH ONCE DAILY, Disp: 30 capsule, Rfl: 2  (DISCONTINUED) lansoprazole (PREVACID) 30 MG capsule, TAKE 1 CAPSULE (30 MG TOTAL) BY MOUTH ONCE DAILY., Disp: 90 capsule, Rfl: 0  ALPRAZolam (XANAX) 0.25 MG tablet, Take 1 tablet (0.25 mg total) by mouth 3 (three) times daily as needed (anxiety regarding flying)., Disp: 20 tablet, Rfl: 0  (DISCONTINUED) famotidine (PEPCID AC) 20 MG tablet, Take 1 tablet (20 mg total) by mouth 2 (two) times daily. As needed before meal that may cause acid reflux/heart burn (Patient not taking: Reported on 6/15/2020), Disp: 60 tablet, Rfl: 11    No current facility-administered medications on file prior to visit.     HIV Screening due on 1970  Shingles Vaccine(1 of 2) due on 2005  TETANUS VACCINE due on 10/13/2015  Colorectal Cancer Screening due on 2017-she declines  Pneumococcal Vaccine (65+ Low/Medium Risk)(1 of 2  - PCV13) due on 03/19/2020-she declines  Mammogram due on 04/09/2020-scheduled with Dr. lynn in      Review of Systems   Constitutional: Negative for chills, diaphoresis, fatigue, fever and unexpected weight change.   HENT: Positive for congestion (Mild symptoms). Negative for ear pain, hearing loss, postnasal drip and sinus pressure.    Eyes: Negative for itching and visual disturbance.   Respiratory: Negative for cough, chest tightness, shortness of breath and wheezing.    Cardiovascular: Negative for chest pain, palpitations and leg swelling.   Gastrointestinal: Negative for abdominal pain, blood in stool, constipation, diarrhea, nausea and vomiting.   Genitourinary: Negative for dysuria, frequency, hematuria, menstrual problem, pelvic pain, vaginal bleeding and vaginal discharge.   Musculoskeletal: Negative for arthralgias, back pain, joint swelling and myalgias.   Skin: Negative for color change and rash.   Neurological: Negative for dizziness and headaches.   Hematological: Negative for adenopathy.   Psychiatric/Behavioral: Positive for dysphoric mood. Negative for sleep disturbance. The patient is nervous/anxious.        Objective:      Physical Exam  Vitals signs and nursing note reviewed.   Constitutional:       General: She is not in acute distress.     Appearance: Normal appearance. She is well-developed and normal weight. She is not ill-appearing, toxic-appearing or diaphoretic.      Comments: Mildly elevated systolic blood pressure without history of hypertension  Mildly overweight with a BMI of 28 she is up 1.7 lb from her last visit with me December 20, 2017   HENT:      Head: Normocephalic and atraumatic.      Right Ear: Tympanic membrane, ear canal and external ear normal. There is no impacted cerumen.      Left Ear: Tympanic membrane, ear canal and external ear normal. There is no impacted cerumen.      Nose: Nose normal. No congestion or rhinorrhea.      Mouth/Throat:      Pharynx: Oropharynx is  clear. No oropharyngeal exudate.   Eyes:      General: No scleral icterus.        Right eye: No discharge.         Left eye: No discharge.      Extraocular Movements: Extraocular movements intact.      Conjunctiva/sclera: Conjunctivae normal.      Pupils: Pupils are equal, round, and reactive to light.   Neck:      Musculoskeletal: Normal range of motion and neck supple. No neck rigidity or muscular tenderness.      Thyroid: No thyromegaly.      Vascular: No carotid bruit or JVD.   Cardiovascular:      Rate and Rhythm: Normal rate and regular rhythm.      Pulses: Normal pulses.      Heart sounds: Normal heart sounds. No murmur. No friction rub. No gallop.    Pulmonary:      Effort: Pulmonary effort is normal. No respiratory distress.      Breath sounds: Normal breath sounds. No stridor. No wheezing, rhonchi or rales.   Chest:      Chest wall: No tenderness.   Abdominal:      General: Bowel sounds are normal. There is no distension.      Palpations: Abdomen is soft. There is no mass.      Tenderness: There is no abdominal tenderness. There is no right CVA tenderness, left CVA tenderness, guarding or rebound.      Hernia: No hernia is present.   Musculoskeletal: Normal range of motion.         General: No swelling, tenderness, deformity or signs of injury.      Right lower leg: No edema.      Left lower leg: No edema.   Lymphadenopathy:      Cervical: No cervical adenopathy.   Skin:     General: Skin is warm and dry.      Capillary Refill: Capillary refill takes less than 2 seconds.      Coloration: Skin is not jaundiced or pale.      Findings: No bruising, erythema, lesion or rash.   Neurological:      General: No focal deficit present.      Mental Status: She is alert and oriented to person, place, and time.      Cranial Nerves: No cranial nerve deficit.      Deep Tendon Reflexes: Reflexes are normal and symmetric.   Psychiatric:         Mood and Affect: Mood normal.         Behavior: Behavior normal.          Thought Content: Thought content normal.         Judgment: Judgment normal.         Assessment:       1. Encounter for preventive health examination    2. Elevated blood pressure reading in office without diagnosis of hypertension    3. Hyperlipidemia, unspecified hyperlipidemia type    4. Major depressive disorder with single episode, in full remission    5. Gastroesophageal reflux disease without esophagitis    6. BMI 28.0-28.9,adult        Plan:       1. Encounter for preventive health examination  - Comprehensive metabolic panel; Future  - Lipid Panel; Future  - CBC auto differential; Future    2. Elevated blood pressure reading in office without diagnosis of hypertension  Patient will get 3 to 4 readings of her blood pressure per week for two weeks and then forward results to me  - Comprehensive metabolic panel; Future  - Lipid Panel; Future  - CBC auto differential; Future    3. Hyperlipidemia, unspecified hyperlipidemia type  Await lab results  - Comprehensive metabolic panel; Future  - Lipid Panel; Future    4. Major depressive disorder with single episode, in full remission  Increase Cymbalta from 60 mg daily to 90 mg daily preferably by taking three 30s.  Insurance may require a 30 and 60 which would be more expensive for her with two co-pays  - DULoxetine (CYMBALTA) 30 MG capsule; Take 3 capsules (90 mg total) by mouth once daily.  Dispense: 270 capsule; Refill: 1    5. Gastroesophageal reflux disease without esophagitis  Asymptomatic currently  - lansoprazole (PREVACID) 30 MG capsule; Take 1 capsule (30 mg total) by mouth once daily.  Dispense: 90 capsule; Refill: 3    6. BMI 28.0-28.9,adult  Discussed salt restriction, diet and weight control

## 2020-06-16 ENCOUNTER — TELEPHONE (OUTPATIENT)
Dept: FAMILY MEDICINE | Facility: CLINIC | Age: 65
End: 2020-06-16

## 2020-06-16 NOTE — TELEPHONE ENCOUNTER
Patient notified change of Cymbalta to 30 mg three daily was approved by insurance. Faxed to [email or web address].

## 2020-06-19 ENCOUNTER — LAB VISIT (OUTPATIENT)
Dept: LAB | Facility: HOSPITAL | Age: 65
End: 2020-06-19
Attending: FAMILY MEDICINE
Payer: COMMERCIAL

## 2020-06-19 DIAGNOSIS — Z00.00 ENCOUNTER FOR PREVENTIVE HEALTH EXAMINATION: ICD-10-CM

## 2020-06-19 DIAGNOSIS — E78.5 HYPERLIPIDEMIA, UNSPECIFIED HYPERLIPIDEMIA TYPE: ICD-10-CM

## 2020-06-19 DIAGNOSIS — R03.0 ELEVATED BLOOD PRESSURE READING IN OFFICE WITHOUT DIAGNOSIS OF HYPERTENSION: ICD-10-CM

## 2020-06-19 LAB
ALBUMIN SERPL BCP-MCNC: 3.7 G/DL (ref 3.5–5.2)
ALP SERPL-CCNC: 79 U/L (ref 55–135)
ALT SERPL W/O P-5'-P-CCNC: 17 U/L (ref 10–44)
ANION GAP SERPL CALC-SCNC: 6 MMOL/L (ref 8–16)
AST SERPL-CCNC: 24 U/L (ref 10–40)
BASOPHILS # BLD AUTO: 0.05 K/UL (ref 0–0.2)
BASOPHILS NFR BLD: 1 % (ref 0–1.9)
BILIRUB SERPL-MCNC: 0.4 MG/DL (ref 0.1–1)
BUN SERPL-MCNC: 16 MG/DL (ref 8–23)
CALCIUM SERPL-MCNC: 9.2 MG/DL (ref 8.7–10.5)
CHLORIDE SERPL-SCNC: 102 MMOL/L (ref 95–110)
CHOLEST SERPL-MCNC: 197 MG/DL (ref 120–199)
CHOLEST/HDLC SERPL: 3.8 {RATIO} (ref 2–5)
CO2 SERPL-SCNC: 30 MMOL/L (ref 23–29)
CREAT SERPL-MCNC: 0.7 MG/DL (ref 0.5–1.4)
DIFFERENTIAL METHOD: ABNORMAL
EOSINOPHIL # BLD AUTO: 0.1 K/UL (ref 0–0.5)
EOSINOPHIL NFR BLD: 2.5 % (ref 0–8)
ERYTHROCYTE [DISTWIDTH] IN BLOOD BY AUTOMATED COUNT: 12.4 % (ref 11.5–14.5)
EST. GFR  (AFRICAN AMERICAN): >60 ML/MIN/1.73 M^2
EST. GFR  (NON AFRICAN AMERICAN): >60 ML/MIN/1.73 M^2
GLUCOSE SERPL-MCNC: 106 MG/DL (ref 70–110)
HCT VFR BLD AUTO: 42.7 % (ref 37–48.5)
HDLC SERPL-MCNC: 52 MG/DL (ref 40–75)
HDLC SERPL: 26.4 % (ref 20–50)
HGB BLD-MCNC: 13.6 G/DL (ref 12–16)
IMM GRANULOCYTES # BLD AUTO: 0.01 K/UL (ref 0–0.04)
IMM GRANULOCYTES NFR BLD AUTO: 0.2 % (ref 0–0.5)
LDLC SERPL CALC-MCNC: 125.6 MG/DL (ref 63–159)
LYMPHOCYTES # BLD AUTO: 2 K/UL (ref 1–4.8)
LYMPHOCYTES NFR BLD: 38.6 % (ref 18–48)
MCH RBC QN AUTO: 30.5 PG (ref 27–31)
MCHC RBC AUTO-ENTMCNC: 31.9 G/DL (ref 32–36)
MCV RBC AUTO: 96 FL (ref 82–98)
MONOCYTES # BLD AUTO: 0.5 K/UL (ref 0.3–1)
MONOCYTES NFR BLD: 10.3 % (ref 4–15)
NEUTROPHILS # BLD AUTO: 2.5 K/UL (ref 1.8–7.7)
NEUTROPHILS NFR BLD: 47.4 % (ref 38–73)
NONHDLC SERPL-MCNC: 145 MG/DL
NRBC BLD-RTO: 0 /100 WBC
PLATELET # BLD AUTO: 269 K/UL (ref 150–350)
PMV BLD AUTO: 10.4 FL (ref 9.2–12.9)
POTASSIUM SERPL-SCNC: 4.9 MMOL/L (ref 3.5–5.1)
PROT SERPL-MCNC: 7.1 G/DL (ref 6–8.4)
RBC # BLD AUTO: 4.46 M/UL (ref 4–5.4)
SODIUM SERPL-SCNC: 138 MMOL/L (ref 136–145)
TRIGL SERPL-MCNC: 97 MG/DL (ref 30–150)
WBC # BLD AUTO: 5.26 K/UL (ref 3.9–12.7)

## 2020-06-19 PROCEDURE — 80061 LIPID PANEL: CPT

## 2020-06-19 PROCEDURE — 85025 COMPLETE CBC W/AUTO DIFF WBC: CPT

## 2020-06-19 PROCEDURE — 36415 COLL VENOUS BLD VENIPUNCTURE: CPT | Mod: PO

## 2020-06-19 PROCEDURE — 80053 COMPREHEN METABOLIC PANEL: CPT

## 2020-06-30 ENCOUNTER — OFFICE VISIT (OUTPATIENT)
Dept: PRIMARY CARE CLINIC | Facility: CLINIC | Age: 65
End: 2020-06-30
Payer: COMMERCIAL

## 2020-06-30 VITALS
RESPIRATION RATE: 20 BRPM | TEMPERATURE: 98 F | OXYGEN SATURATION: 100 % | HEART RATE: 99 BPM | SYSTOLIC BLOOD PRESSURE: 157 MMHG | DIASTOLIC BLOOD PRESSURE: 83 MMHG

## 2020-06-30 DIAGNOSIS — U07.1 COVID-19 VIRUS DETECTED: ICD-10-CM

## 2020-06-30 DIAGNOSIS — U07.1 COVID-19: Primary | ICD-10-CM

## 2020-06-30 PROCEDURE — U0003 INFECTIOUS AGENT DETECTION BY NUCLEIC ACID (DNA OR RNA); SEVERE ACUTE RESPIRATORY SYNDROME CORONAVIRUS 2 (SARS-COV-2) (CORONAVIRUS DISEASE [COVID-19]), AMPLIFIED PROBE TECHNIQUE, MAKING USE OF HIGH THROUGHPUT TECHNOLOGIES AS DESCRIBED BY CMS-2020-01-R: HCPCS

## 2020-06-30 PROCEDURE — 99203 PR OFFICE/OUTPT VISIT, NEW, LEVL III, 30-44 MIN: ICD-10-PCS | Mod: S$GLB,CS,, | Performed by: EMERGENCY MEDICINE

## 2020-06-30 PROCEDURE — 99203 OFFICE O/P NEW LOW 30 MIN: CPT | Mod: S$GLB,CS,, | Performed by: EMERGENCY MEDICINE

## 2020-06-30 NOTE — PROGRESS NOTES
Subjective:        Time seen by provider: 4:05 PM on 06/30/2020    Gaby Donohue is a 65 y.o. female with anxiety who presents for an evaluation of possible COVID-19. She is asymptomatic but was exposed to someone with positive results. The patient states her daughter, who she resides with, recently tested positive. She reports her daughter had began endorsing symptoms a couple of days ago and endorsed a fever accompanied by chills last night. No pertinent PSHx.     Review of Systems   Constitutional: Negative for activity change, appetite change, fatigue and fever.   HENT: Negative for congestion, rhinorrhea and sore throat.    Respiratory: Negative for cough, chest tightness, shortness of breath and wheezing.    Cardiovascular: Negative for chest pain and palpitations.   Gastrointestinal: Negative for diarrhea, nausea and vomiting.   Musculoskeletal: Negative for arthralgias and myalgias.   Skin: Negative for rash.   Neurological: Negative for weakness, light-headedness, numbness and headaches.       Objective:      Physical Exam  Vitals signs and nursing note reviewed.   Constitutional:       General: She is not in acute distress.     Appearance: She is well-developed. She is not diaphoretic.   HENT:      Head: Normocephalic and atraumatic.      Nose: Nose normal.   Eyes:      Conjunctiva/sclera: Conjunctivae normal.   Neck:      Musculoskeletal: Normal range of motion.   Cardiovascular:      Rate and Rhythm: Normal rate and regular rhythm.      Heart sounds: Normal heart sounds. No murmur.   Pulmonary:      Effort: No respiratory distress.      Breath sounds: Normal breath sounds. No wheezing.   Musculoskeletal: Normal range of motion.   Skin:     General: Skin is warm and dry.   Neurological:      Mental Status: She is alert and oriented to person, place, and time.         Assessment and Plan:      Diagnoses and all orders for this visit:    COVID-19  -     COVID-19 Routine Screening  - Discharge home and await  results.   - Return to clinic or ED for new or worsening symptoms.   - Follow-up with PCP as needed.     Scribe Attestation:   I, Carmen Duval, am scribing for, and in the presence of, Lucita Duarte PA-C. I performed the above scribed service and the documentation accurately describes the services I performed. I attest to the accuracy of the note.    I, Lucita Duarte PA-C, personally performed the services described in this documentation. All medical record entries made by the scribe were at my direction and in my presence.  I have reviewed the chart and agree that the record reflects my personal performance and is accurate and complete. Lucita Duarte PA-C.  5:17 PM 06/30/2020

## 2020-07-01 LAB — SARS-COV-2 RNA RESP QL NAA+PROBE: DETECTED

## 2020-07-05 ENCOUNTER — NURSE TRIAGE (OUTPATIENT)
Dept: ADMINISTRATIVE | Facility: CLINIC | Age: 65
End: 2020-07-05

## 2020-07-05 NOTE — TELEPHONE ENCOUNTER
Pt calling about info for testing info. Pt wants to get retested said that she only has loss of smell and taste ad a slight h/a will continue to monitor. Pt will call back if any problems told to call medical records or MD for note for work or test results to prove to her job    Reason for Disposition   [1] COVID-19 diagnosed by positive lab test AND [2] mild symptoms (e.g., cough, fever, others) AND [3] no complications or SOB    Additional Information   Negative: SEVERE difficulty breathing (e.g., struggling for each breath, speaks in single words)   Negative: Difficult to awaken or acting confused (e.g., disoriented, slurred speech)   Negative: Bluish (or gray) lips or face now   Negative: Shock suspected (e.g., cold/pale/clammy skin, too weak to stand, low BP, rapid pulse)   Negative: Sounds like a life-threatening emergency to the triager   Negative: [1] COVID-19 exposure AND [2] NO symptoms   Negative: COVID-19 and Breastfeeding, questions about   Negative: [1] Adult with possible COVID-19 symptoms AND [2] triager concerned about severity of symptoms or other causes   Negative: SEVERE or constant chest pain or pressure (Exception: mild central chest pain, present only when coughing)   Negative: MODERATE difficulty breathing (e.g., speaks in phrases, SOB even at rest, pulse 100-120)   Negative: Patient sounds very sick or weak to the triager   Negative: MILD difficulty breathing (e.g., minimal/no SOB at rest, SOB with walking, pulse <100)   Negative: Chest pain or pressure   Negative: Fever > 103 F (39.4 C)   Negative: [1] Fever > 101 F (38.3 C) AND [2] age > 60   Negative: [1] Fever > 100.0 F (37.8 C) AND [2] bedridden (e.g., nursing home patient, CVA, chronic illness, recovering from surgery)   Negative: HIGH RISK patient (e.g., age > 64 years, diabetes, heart or lung disease, weak immune system)   Negative: Fever present > 3 days (72 hours)   Negative: [1] Fever returns after gone for  over 24 hours AND [2] symptoms worse or not improved   Negative: [1] Continuous (nonstop) coughing interferes with work or school AND [2] no improvement using cough treatment per protocol   Negative: [1] COVID-19 infection suspected by caller or triager AND [2] mild symptoms (cough, fever, or others) AND [3] no complications or SOB   Negative: Cough present > 3 weeks    Protocols used: CORONAVIRUS (COVID-19) DIAGNOSED OR [id number]

## 2020-07-10 ENCOUNTER — TELEPHONE (OUTPATIENT)
Dept: FAMILY MEDICINE | Facility: CLINIC | Age: 65
End: 2020-07-10

## 2020-07-10 NOTE — TELEPHONE ENCOUNTER
----- Message from Alesha Estevez sent at 7/10/2020 12:09 PM CDT -----  Type: Needs Medical Advice  Who Called:  Patient   Best Call Back Number: [phone]  Additional Information: Per patient tested positive for COVID and would like to discuss it further-please advise-thank you

## 2020-07-10 NOTE — TELEPHONE ENCOUNTER
Patient tested positive for covid 6/30. Works at the [employer] and asking for return to work note. Also interested in getting the covid antibody test. She wanted to retest for covid but it was explained to her these aren't done for 90 days after initial test.

## 2020-07-10 NOTE — TELEPHONE ENCOUNTER
She has to be a minimum of 10 days post diagnosis, as of today she meets that but some work places are still requiring a minimum of 14 days.    She has to have had no fever for at least three days without using a medication to reduce the fever    She does not have to be symptom-free but she does have to be showing improvement    If a workplace absolutely requires a retest it can be done in spite of the lockout.  But there is a high probability that the test will remain positive for 90 days or more due to residual RNA long after any live infectious virus particles can be cultured.  If the workplace does not require the retest and she is positive then she may have to start the quarantine period over again.  In addition, her insurance may not cover the cost of the additional test.

## 2020-07-13 ENCOUNTER — TELEPHONE (OUTPATIENT)
Dept: FAMILY MEDICINE | Facility: CLINIC | Age: 65
End: 2020-07-13

## 2020-07-13 DIAGNOSIS — Z11.59 SCREENING FOR VIRAL DISEASE: Primary | ICD-10-CM

## 2020-07-13 NOTE — TELEPHONE ENCOUNTER
----- Message from Conchis Musa MA sent at 7/13/2020 10:14 AM CDT -----  Type: Needs Medical Advice  Who Called:  Gaby  Fercho Call Back Number:   Additional Information: patient would like to give update on her symptoms.  Please call to discuss

## 2020-07-13 NOTE — TELEPHONE ENCOUNTER
I do not know if anyone knows when you can do the antibody test after having COVID.  I would suggest waiting three months.

## 2020-07-13 NOTE — TELEPHONE ENCOUNTER
Patient has covid and will send return to work form to me in email- also wants to know when she can do Covid antibody test.

## 2020-07-14 NOTE — TELEPHONE ENCOUNTER
Patient informed. She will do lab in 3 months. Please place order. We can mail her an apt. She will move it if she needs to.

## 2020-07-15 NOTE — TELEPHONE ENCOUNTER
I still don't have the answers to my telephone message of July 10 much less answers to the questions on the return to work form.  I cannot complete it.    Has she had fever in the last 72 hours and if not has she been taking anything to reduce fever.    Is she having any symptoms now-she reported loss of taste and smell and a headache to the nurse who was monitoring her case.  If she is having symptoms, are they improved.    What are the answers to the questions on the form?

## 2020-07-15 NOTE — TELEPHONE ENCOUNTER
Afebrile over 72 hrs, still does have fatigue, headache, loss of taste and smell - due to go back on 7/21 to work-patient says she is improving but feels like she cannot go back to work full time at this point. I did get the answers for the questions on the form.

## 2020-07-20 ENCOUNTER — TELEPHONE (OUTPATIENT)
Dept: FAMILY MEDICINE | Facility: CLINIC | Age: 65
End: 2020-07-20

## 2020-07-20 NOTE — TELEPHONE ENCOUNTER
Patient notified I will email her return to work form after covid illness once signed by Dr. Garnica.

## 2020-07-20 NOTE — TELEPHONE ENCOUNTER
----- Message from Latoya Alvarado sent at 7/20/2020 11:33 AM CDT -----  Contact: pt  Patient called and asked for a call back she sent a form to your office thru her E mail she needs  To go over the form . Pt is asking for a call back today please .    Call back [phone]

## 2020-07-20 NOTE — TELEPHONE ENCOUNTER
FROM AN EXTERNAL SENDER!  DO YOU TRUST THIS EMAIL?  If you are unsure, remember to use the Report Suspicious Email button in [place] to send to "[employer] for review. DO NOT click any links and NEVER input your username and password.    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  When you get some time today will you please update my assessment on my Back to Work form?   The first one was completed on Wednesday, July 15th however I was having symptoms of fatigue, loss of taste and smell and a slight headache. I no longer have any symptoms.  I would appreciate it. I now am ready to work a full day.  I tried to call and leave a message at [facility] but the phones werent working properly.  Please call me later today.  Thank you for your assistance.  Gaby Donohue  [phone]  [birth year]      Sent from my iPhone

## 2020-09-08 DIAGNOSIS — Z12.31 ENCOUNTER FOR SCREENING MAMMOGRAM FOR MALIGNANT NEOPLASM OF BREAST: Primary | ICD-10-CM

## 2020-09-10 ENCOUNTER — TELEPHONE (OUTPATIENT)
Dept: FAMILY MEDICINE | Facility: CLINIC | Age: 65
End: 2020-09-10

## 2020-09-10 DIAGNOSIS — U07.1 COVID-19 VIRUS INFECTION: Primary | ICD-10-CM

## 2020-09-10 NOTE — TELEPHONE ENCOUNTER
----- Message from Latoya Alvarado sent at 9/10/2020 10:36 AM CDT -----  Regarding: Advice  Contact: patient  Type: Needs Medical Advice    Who Called:  patient  Symptoms (please be specific):  covid antibodies  How long has patient had these symptoms:    Pharmacy name and phone #:    Best Call Back Number: [phone]  Additional Information: patient is considering plasma

## 2020-09-14 ENCOUNTER — PATIENT OUTREACH (OUTPATIENT)
Dept: ADMINISTRATIVE | Facility: HOSPITAL | Age: 65
End: 2020-09-14

## 2020-09-16 ENCOUNTER — LAB VISIT (OUTPATIENT)
Dept: LAB | Facility: HOSPITAL | Age: 65
End: 2020-09-16
Attending: FAMILY MEDICINE
Payer: COMMERCIAL

## 2020-09-16 DIAGNOSIS — Z11.59 SCREENING FOR VIRAL DISEASE: ICD-10-CM

## 2020-09-16 PROCEDURE — 86769 SARS-COV-2 COVID-19 ANTIBODY: CPT

## 2020-09-16 PROCEDURE — 36415 COLL VENOUS BLD VENIPUNCTURE: CPT | Mod: PO

## 2020-09-17 LAB — SARS-COV-2 IGG SERPLBLD QL IA.RAPID: POSITIVE

## 2020-09-22 ENCOUNTER — HOSPITAL ENCOUNTER (OUTPATIENT)
Dept: RADIOLOGY | Facility: HOSPITAL | Age: 65
Discharge: HOME | End: 2020-09-22
Attending: OBSTETRICS & GYNECOLOGY
Payer: COMMERCIAL

## 2020-09-22 DIAGNOSIS — Z12.31 ENCOUNTER FOR SCREENING MAMMOGRAM FOR MALIGNANT NEOPLASM OF BREAST: ICD-10-CM

## 2020-09-22 PROCEDURE — 77067 SCR MAMMO BI INCL CAD: CPT | Mod: TC,PO

## 2020-11-24 RX ORDER — PANTOPRAZOLE SODIUM 40 MG/1
40 TABLET, DELAYED RELEASE ORAL DAILY
Qty: 30 TABLET | Refills: 11 | Status: SHIPPED | OUTPATIENT
Start: 2020-11-24 | End: 2021-07-02

## 2020-11-24 NOTE — TELEPHONE ENCOUNTER
Patient already taking 2 Prevacid daily. She opts for the Protonix. Send to [facility]. She will keep appt with gi.

## 2020-11-24 NOTE — TELEPHONE ENCOUNTER
----- Message from Ana Patricia sent at 11/24/2020 10:40 AM CST -----  Contact: self  Type: Needs Medical Advice  Who Called:  patient   Symptoms (please be specific):  stomach issues  How long has patient had these symptoms:  a couple of weeks  Pharmacy name and phone #:    Best Call Back Number:[phone] (home)     Additional Information: patient requesting to speak to nurse before scheduling with gastro

## 2020-11-24 NOTE — TELEPHONE ENCOUNTER
"Patient has a lot of stress- has gerd- taking Prevacid twice a day- not helping with gerd- patient is due for her colonoscopy- she was given appt with gi 12/3- having bloating and upper abdominal discomfort- asking if [facility] can give her a medication to help her stomach "settle down"  "

## 2020-11-24 NOTE — TELEPHONE ENCOUNTER
No new care gaps identified.  Powered by [employer]. Reference number: [id number]. 11/24/2020 3:32:28 PM   CST

## 2020-11-24 NOTE — TELEPHONE ENCOUNTER
We can increase the Prevacid to twice a day or switch her to Prilosec or Protonix.  She may have to have an upper endoscopy because Prevacid is usually strong enough to handle it.  She should keep appointment with GI.

## 2020-12-10 ENCOUNTER — OFFICE VISIT (OUTPATIENT)
Dept: GASTROENTEROLOGY | Facility: CLINIC | Age: 65
End: 2020-12-10
Payer: COMMERCIAL

## 2020-12-10 VITALS — HEIGHT: 64 IN | BODY MASS INDEX: 29.74 KG/M2 | WEIGHT: 174.19 LBS

## 2020-12-10 DIAGNOSIS — Z86.010 HISTORY OF COLON POLYPS: ICD-10-CM

## 2020-12-10 DIAGNOSIS — R11.0 NAUSEA: ICD-10-CM

## 2020-12-10 DIAGNOSIS — Z90.49 S/P CHOLECYSTECTOMY: ICD-10-CM

## 2020-12-10 DIAGNOSIS — R14.3 FLATUS: ICD-10-CM

## 2020-12-10 DIAGNOSIS — K21.9 GASTROESOPHAGEAL REFLUX DISEASE, UNSPECIFIED WHETHER ESOPHAGITIS PRESENT: Primary | ICD-10-CM

## 2020-12-10 PROCEDURE — 99204 OFFICE O/P NEW MOD 45 MIN: CPT | Mod: S$GLB,,, | Performed by: NURSE PRACTITIONER

## 2020-12-10 PROCEDURE — 99999 PR PBB SHADOW E&M-EST. PATIENT-LVL III: CPT | Mod: PBBFAC,,, | Performed by: NURSE PRACTITIONER

## 2020-12-10 PROCEDURE — 99999 PR PBB SHADOW E&M-EST. PATIENT-LVL III: ICD-10-PCS | Mod: PBBFAC,,, | Performed by: NURSE PRACTITIONER

## 2020-12-10 PROCEDURE — 99204 PR OFFICE/OUTPT VISIT, NEW, LEVL IV, 45-59 MIN: ICD-10-PCS | Mod: S$GLB,,, | Performed by: NURSE PRACTITIONER

## 2020-12-10 NOTE — PATIENT INSTRUCTIONS
"  GERD (Adult)    The esophagus is a tube that carries food from the mouth to the stomach. A valve at the lower end of the esophagus prevents stomach acid from flowing upward. When this valve doesn't work properly, stomach contents may repeatedly flow back up (reflux) into the esophagus. This is called gastroesophageal reflux disease (GERD). GERD can irritate the esophagus. It can cause problems with swallowing or breathing. In severe cases, GERD can cause recurrent pneumonia or other serious problems.  Symptoms of reflux include burning, pressure or sharp pain in the upper abdomen or mid to lower chest. The pain can spread to the neck, back, or shoulder. There may be belching, an acid taste in the back of the throat, chronic cough, or sore throat or hoarseness. GERD symptoms often occur during the day after a big meal. They can also occur at night when lying down.   Home care  Lifestyle changes can help reduce symptoms. If needed, medicines may be prescribed. Symptoms often improve with treatment, but if treatment is stopped, the symptoms often return after a few months. So most persons with GERD will need to continue treatment.  Lifestyle changes  · Limit or avoid fatty, fried, and spicy foods, as well as coffee, chocolate, mint, and foods with high acid content such as tomatoes and citrus fruit and juices (orange, grapefruit, lemon).  · Dont eat large meals, especially at night. Frequent, smaller meals are best. Do not lie down right after eating. And dont eat anything 3 hours before going to bed.  · Avoid drinking alcohol and smoking. As much as possible, stay away from second hand smoke.  · If you are overweight, losing weight will reduce symptoms.   · Avoid wearing tight clothing around your stomach area.  · If your symptoms occur during sleep, use a foam wedge to elevate your upper body (not just your head.) Or, place 4" blocks under the head of your bed.  Medicines  If needed, medicines can help relieve " the symptoms of GERD and prevent damage to the esophagus. Discuss a medicine plan with your healthcare provider. This may include one or more of the following medicines:  · Antacids to help neutralize the normal acids in your stomach.  · Acid blockers (H2 blockers) to decrease acid production.  · Acid inhibitors (PPIs) to decrease acid production in a different way than the blockers. They may work better, but can take a little longer to take effect.  Take an antacid 30-60 minutes after eating and at bedtime, but not at the same time as an acid blocker.  Try not to take medicines such as ibuprofen and aspirin. If you are taking aspirin for your heart or other medical reasons, talk to your healthcare provider about stopping it.  Follow-up care  Follow up with your healthcare provider or as advised by our staff.  When to seek medical advice  Call your healthcare provider if any of the following occur:  · Stomach pain gets worse or moves to the lower right abdomen (appendix area)  · Chest pain appears or gets worse, or spreads to the back, neck, shoulder, or arm  · Frequent vomiting (cant keep down liquids)  · Blood in the stool or vomit (red or black in color)  · Feeling weak or dizzy  · Fever of 100.4ºF (38ºC) or higher, or as directed by your healthcare provider  Date Last Reviewed: 6/23/2015 © 2000-2017 [employer]. [address] [place] [address], [place] [address]. All rights reserved. This information is not intended as a substitute for professional medical care. Always follow your healthcare professional's instructions.      Excess Gas  Certain foods produce gas when digested. In some people, these foods make an excessive amount of gas. This may cause bloating, burping, or increased gas passing through the rectum (flatulence).     Foods that cause gas  The following foods are more likely to cause this problem. Limit them, or remove them from your diet:  · Broccoli  · Cauliflower  · [place]  sprouts  · Cabbage  · Cooked dried beans  · Fizzy (carbonated) drinks, such as sparkling water, soda, beer, and champagne  Other causes  Other causes of excess gas include:  · Eating too fast or talking while you chew. This may cause you to swallow air. This increases the amount of gas in your stomach. And it may make your symptoms worse. Chew each mouthful completely before you swallow. Take your time.  · Chewing on gum or sucking on hard candy. These cause you to swallow more often. And some of what you are swallowing is air. This leads to more gas in your stomach. Avoid chewing gum and hard candy.  · Overeating. This may increase the feeling of being bloated and cause more gas. When you are full, stop eating.   · Being constipated. This can increase the amount of normal intestinal gas. Avoid constipation by getting more fiber in your diet. Good sources of fiber include whole-grain cereal, fresh vegetables (except those in the above list), and fresh fruits. High-fiber foods absorb water and carry it out of the body. When adding more fiber to your diet, you also need to drink more water. You should drink at least 8, 8-ounce glasses of water (2 quarts) per day.  Date Last Reviewed: 8/1/2016  © 8119-7702 [employer]. [address] [place] [address], [place] [address]. All rights reserved. This information is not intended as a substitute for professional medical care. Always follow your healthcare professional's instructions.

## 2020-12-10 NOTE — LETTER
December 10, 2020      Kenn Garnica MD  [address]  [place] [address]           [facility]  [address]  [place] [address]  Phone: [phone]          Patient: Gaby Donohue   MR Number: [id number]   YOB: 1955   Date of Visit: 12/10/2020       Dear Dr. Kenn Garnica:    Thank you for referring Gaby Donohue to me for evaluation. Attached you will find relevant portions of my assessment and plan of care.    If you have questions, please do not hesitate to call me. I look forward to following Gaby Donohue along with you.    Sincerely,    Vero Bell, NP    Enclosure  CC:  No Recipients    If you would like to receive this communication electronically, please contact [email or web address] or [phone] to request more information on [facility] access.    For providers and/or their staff who would like to refer a patient to [facility], please contact us through our one-stop-shop provider referral line, Guillaume Silver, at [phone].    If you feel you have received this communication in error or would no longer like to receive these types of communications, please e-mail [email or web address]

## 2020-12-10 NOTE — PROGRESS NOTES
Subjective:       Patient ID: Gaby Donohue is a 65 y.o. female, Body mass index is 29.9 kg/m².    Chief Complaint: Gastroesophageal Reflux (adv colonoscopy)      Patient is new to me. Former patient of Dr. Jaime.  Referred by Dr. Garnica for GERD.    Gastroesophageal Reflux  She complains of heartburn and nausea. She reports no abdominal pain, no belching, no chest pain, no choking, no coughing, no dysphagia, no early satiety, no globus sensation, no hoarse voice or no sore throat. This is a chronic problem. The current episode started more than 1 year ago. The problem occurs frequently. The problem has been gradually improving (since starting Prevacid). The heartburn is located in the substernum. The heartburn is of moderate intensity. The heartburn does not wake her from sleep. The heartburn does not limit her activity. The heartburn doesn't change with position. The symptoms are aggravated by certain foods (especially red gravy and spicy foods; occ feels like food sits in her esophagus). Pertinent negatives include no anemia, melena or weight loss. Risk factors include ETOH use and smoking/tobacco exposure (former smoker). She has tried a PPI (Past: Prevacid- lost effectiveness; Currently: Protonix 40 mg once daily) for the symptoms. Past procedures include an EGD (done in 2012).     Review of Systems   Constitutional: Negative for appetite change, fever, unexpected weight change and weight loss.   HENT: Negative for hoarse voice, sore throat and trouble swallowing.    Respiratory: Negative for cough, choking and shortness of breath.    Cardiovascular: Negative for chest pain.   Gastrointestinal: Positive for heartburn and nausea. Negative for abdominal distention, abdominal pain, anal bleeding, blood in stool, constipation (bowel movements are once every other day), diarrhea, dysphagia, melena, rectal pain and vomiting.        Reports excessive flatus- especially after eating   Genitourinary: Negative for  difficulty urinating and dysuria.   Musculoskeletal: Negative for gait problem.   Skin: Negative for rash.   Neurological: Negative for speech difficulty.   Psychiatric/Behavioral: Negative for confusion.       Past Medical History:   Diagnosis Date    Anxiety     Cancer     skin ca on face    Depression     GERD (gastroesophageal reflux disease)     Hyperlipidemia     Wears glasses       Past Surgical History:   Procedure Laterality Date    CHOLECYSTECTOMY      COLONOSCOPY  4/20/12    Dr. Jaime, 5 year recheck    ESOPHAGOGASTRODUODENOSCOPY  08/17/2012    Dr. Jaime; gastritis; bx unremarkable    FRACTURE SURGERY  11/02/2013    left arm Dr Cooper    laporoscopy        Family History   Problem Relation Age of Onset    COPD Mother     Emphysema Mother     Heart disease Father     Cancer Sister         skin cancer face    Cancer Brother         skin cancer face     No Known Problems Daughter     Cancer Sister         skin cancer face    Cancer Sister         skin cancer face     No Known Problems Brother     No Known Problems Daughter       Wt Readings from Last 10 Encounters:   12/10/20 79 kg (174 lb 2.6 oz)   06/15/20 74.1 kg (163 lb 5.8 oz)   03/02/20 69.8 kg (153 lb 14.1 oz)   05/15/19 75.9 kg (167 lb 5.3 oz)   01/29/19 75.8 kg (167 lb)   09/11/18 76.1 kg (167 lb 12.3 oz)   12/29/17 74 kg (163 lb 2.3 oz)   12/20/17 73.3 kg (161 lb 9.6 oz)   12/13/17 74.2 kg (163 lb 9.3 oz)   11/14/17 73.9 kg (163 lb)     Lab Results   Component Value Date    WBC 5.26 06/19/2020    HGB 13.6 06/19/2020    HCT 42.7 06/19/2020    MCV 96 06/19/2020     06/19/2020     CMP  Sodium   Date Value Ref Range Status   06/19/2020 138 136 - 145 mmol/L Final     Potassium   Date Value Ref Range Status   06/19/2020 4.9 3.5 - 5.1 mmol/L Final     Chloride   Date Value Ref Range Status   06/19/2020 102 95 - 110 mmol/L Final     CO2   Date Value Ref Range Status   06/19/2020 30 (H) 23 - 29 mmol/L Final     Glucose    Date Value Ref Range Status   06/19/2020 106 70 - 110 mg/dL Final     BUN   Date Value Ref Range Status   06/19/2020 16 8 - 23 mg/dL Final     Creatinine   Date Value Ref Range Status   06/19/2020 0.7 0.5 - 1.4 mg/dL Final     Calcium   Date Value Ref Range Status   06/19/2020 9.2 8.7 - 10.5 mg/dL Final     Total Protein   Date Value Ref Range Status   06/19/2020 7.1 6.0 - 8.4 g/dL Final     Albumin   Date Value Ref Range Status   06/19/2020 3.7 3.5 - 5.2 g/dL Final     Total Bilirubin   Date Value Ref Range Status   06/19/2020 0.4 0.1 - 1.0 mg/dL Final     Comment:     For infants and newborns, interpretation of results should be based  on gestational age, weight and in agreement with clinical  observations.  Premature Infant recommended reference ranges:  Up to 24 hours.............<8.0 mg/dL  Up to 48 hours............<12.0 mg/dL  3-5 days..................<15.0 mg/dL  6-29 days.................<15.0 mg/dL       Alkaline Phosphatase   Date Value Ref Range Status   06/19/2020 79 55 - 135 U/L Final     AST   Date Value Ref Range Status   06/19/2020 24 10 - 40 U/L Final     ALT   Date Value Ref Range Status   06/19/2020 17 10 - 44 U/L Final     Anion Gap   Date Value Ref Range Status   06/19/2020 6 (L) 8 - 16 mmol/L Final     eGFR if    Date Value Ref Range Status   06/19/2020 >60.0 >60 mL/min/1.73 m^2 Final     eGFR if non    Date Value Ref Range Status   06/19/2020 >60.0 >60 mL/min/1.73 m^2 Final     Comment:     Calculation used to obtain the estimated glomerular filtration  rate (eGFR) is the CKD-EPI equation.                 Reviewed prior medical records including endoscopy history (see surgical history).     Objective:      Physical Exam  Constitutional:       General: She is not in acute distress.     Appearance: She is well-developed.   HENT:      Head: Normocephalic.      Right Ear: Hearing normal.      Left Ear: Hearing normal.      Nose: Nose normal.      Mouth/Throat:       Comments: Patient wearing mask due to COVID concerns  Eyes:      General: Lids are normal.      Conjunctiva/sclera: Conjunctivae normal.      Pupils: Pupils are equal, round, and reactive to light.   Neck:      Musculoskeletal: Normal range of motion.      Trachea: Trachea normal.   Cardiovascular:      Rate and Rhythm: Normal rate and regular rhythm.      Heart sounds: Normal heart sounds. No murmur.   Pulmonary:      Effort: Pulmonary effort is normal. No respiratory distress.      Breath sounds: Normal breath sounds. No stridor. No wheezing.   Abdominal:      General: Bowel sounds are normal. There is no distension.      Palpations: Abdomen is soft. There is no mass.      Tenderness: There is no abdominal tenderness. There is no guarding or rebound.   Musculoskeletal: Normal range of motion.   Skin:     General: Skin is warm and dry.      Findings: No rash.      Comments: Non jaundiced   Neurological:      Mental Status: She is alert and oriented to person, place, and time.   Psychiatric:         Speech: Speech normal.         Behavior: Behavior normal. Behavior is cooperative.           Assessment:       1. Gastroesophageal reflux disease, unspecified whether esophagitis present    2. Nausea    3. Flatus    4. History of colon polyps    5. S/P cholecystectomy           Plan:   All diagnoses and orders for this visit:    Gastroesophageal reflux disease, unspecified whether esophagitis present & Nausea   - Schedule EGD   - Continue Protonix 40 mg once daily   - Take PPI in the morning 30 minutes before breakfast   - Recommend to avoid large meals, avoid eating within 3 hours of bedtime, elevate head of bed if nocturnal symptoms are present, smoking cessation (if current smoker), & weight loss (if overweight).    - Recommend minimize/avoid high-fat foods, chocolate, caffeine, citrus, alcohol, & tomato products.   - Advised to avoid/limit use of NSAID's, since they can cause GI upset, bleeding, and/or ulcers.  If needed, take with food.     Flatus   - Recommended OTC simethicone as directed, such as Phazyme or Gas-x   - Start Probiotic once daily   - Discussed with patient about low gas diet: Reduce or eliminate these foods from your diet: Broccoli, Cauliflower, [place] sprouts, Cabbage, Cooked dried beans, Carbonated beverages (sparkling water, soda, beer, champagne)    History of colon polyps   - Schedule Colonoscopy    S/P cholecystectomy    If no improvement in symptoms or symptoms worsen, call/follow-up at clinic or go to ER

## 2020-12-11 DIAGNOSIS — Z12.11 SPECIAL SCREENING FOR MALIGNANT NEOPLASMS, COLON: Primary | ICD-10-CM

## 2021-01-05 DIAGNOSIS — F32.5 MAJOR DEPRESSIVE DISORDER WITH SINGLE EPISODE, IN FULL REMISSION: ICD-10-CM

## 2021-01-05 RX ORDER — DULOXETIN HYDROCHLORIDE 30 MG/1
CAPSULE, DELAYED RELEASE ORAL
Qty: 270 CAPSULE | Refills: 1 | Status: SHIPPED | OUTPATIENT
Start: 2021-01-05 | End: 2021-08-02

## 2021-02-08 ENCOUNTER — PATIENT MESSAGE (OUTPATIENT)
Dept: ADMINISTRATIVE | Facility: CLINIC | Age: 66
End: 2021-02-08

## 2021-02-12 ENCOUNTER — IMMUNIZATION (OUTPATIENT)
Dept: PRIMARY CARE CLINIC | Facility: CLINIC | Age: 66
End: 2021-02-12
Payer: COMMERCIAL

## 2021-02-12 DIAGNOSIS — Z23 NEED FOR VACCINATION: Primary | ICD-10-CM

## 2021-02-12 PROCEDURE — 0001A COVID-19, MRNA, LNP-S, PF, 30 MCG/0.3 ML DOSE VACCINE: CPT | Mod: S$GLB,,, | Performed by: FAMILY MEDICINE

## 2021-02-12 PROCEDURE — 0001A COVID-19, MRNA, LNP-S, PF, 30 MCG/0.3 ML DOSE VACCINE: ICD-10-PCS | Mod: S$GLB,,, | Performed by: FAMILY MEDICINE

## 2021-02-12 PROCEDURE — 91300 COVID-19, MRNA, LNP-S, PF, 30 MCG/0.3 ML DOSE VACCINE: ICD-10-PCS | Mod: S$GLB,,, | Performed by: FAMILY MEDICINE

## 2021-02-12 PROCEDURE — 91300 COVID-19, MRNA, LNP-S, PF, 30 MCG/0.3 ML DOSE VACCINE: CPT | Mod: S$GLB,,, | Performed by: FAMILY MEDICINE

## 2021-03-05 ENCOUNTER — IMMUNIZATION (OUTPATIENT)
Dept: PRIMARY CARE CLINIC | Facility: CLINIC | Age: 66
End: 2021-03-05
Payer: COMMERCIAL

## 2021-03-05 DIAGNOSIS — Z23 NEED FOR VACCINATION: Primary | ICD-10-CM

## 2021-03-05 PROCEDURE — 91300 COVID-19, MRNA, LNP-S, PF, 30 MCG/0.3 ML DOSE VACCINE: ICD-10-PCS | Mod: S$GLB,,, | Performed by: FAMILY MEDICINE

## 2021-03-05 PROCEDURE — 0002A COVID-19, MRNA, LNP-S, PF, 30 MCG/0.3 ML DOSE VACCINE: CPT | Mod: CV19,S$GLB,, | Performed by: FAMILY MEDICINE

## 2021-03-05 PROCEDURE — 0002A COVID-19, MRNA, LNP-S, PF, 30 MCG/0.3 ML DOSE VACCINE: ICD-10-PCS | Mod: CV19,S$GLB,, | Performed by: FAMILY MEDICINE

## 2021-03-05 PROCEDURE — 91300 COVID-19, MRNA, LNP-S, PF, 30 MCG/0.3 ML DOSE VACCINE: CPT | Mod: S$GLB,,, | Performed by: FAMILY MEDICINE

## 2021-05-10 ENCOUNTER — TELEPHONE (OUTPATIENT)
Dept: GASTROENTEROLOGY | Facility: CLINIC | Age: 66
End: 2021-05-10

## 2021-05-11 ENCOUNTER — TELEPHONE (OUTPATIENT)
Dept: GASTROENTEROLOGY | Facility: CLINIC | Age: 66
End: 2021-05-11

## 2021-05-11 DIAGNOSIS — Z86.010 HX OF COLONIC POLYPS: ICD-10-CM

## 2021-05-11 DIAGNOSIS — K21.9 GASTROESOPHAGEAL REFLUX DISEASE, UNSPECIFIED WHETHER ESOPHAGITIS PRESENT: Primary | ICD-10-CM

## 2021-06-09 ENCOUNTER — HOSPITAL ENCOUNTER (OUTPATIENT)
Facility: HOSPITAL | Age: 66
Discharge: HOME | End: 2021-06-09
Attending: INTERNAL MEDICINE | Admitting: INTERNAL MEDICINE
Payer: COMMERCIAL

## 2021-06-09 ENCOUNTER — ANESTHESIA (OUTPATIENT)
Dept: ENDOSCOPY | Facility: HOSPITAL | Age: 66
End: 2021-06-09
Payer: COMMERCIAL

## 2021-06-09 ENCOUNTER — ANESTHESIA EVENT (OUTPATIENT)
Dept: ENDOSCOPY | Facility: HOSPITAL | Age: 66
End: 2021-06-09
Payer: COMMERCIAL

## 2021-06-09 DIAGNOSIS — K21.9 GASTROESOPHAGEAL REFLUX DISEASE WITHOUT ESOPHAGITIS: Primary | ICD-10-CM

## 2021-06-09 DIAGNOSIS — R19.4 CHANGE IN BOWEL HABITS: ICD-10-CM

## 2021-06-09 PROCEDURE — 45381 COLONOSCOPY SUBMUCOUS NJX: CPT | Mod: 51,,, | Performed by: INTERNAL MEDICINE

## 2021-06-09 PROCEDURE — 43251 PR EGD, FLEX, W/REMOVAL, TUMOR/POLYP/LESION(S), SNARE: ICD-10-PCS | Mod: ,,, | Performed by: INTERNAL MEDICINE

## 2021-06-09 PROCEDURE — D9220A PRA ANESTHESIA: Mod: ,,, | Performed by: ANESTHESIOLOGY

## 2021-06-09 PROCEDURE — 43239 PR EGD, FLEX, W/BIOPSY, SGL/MULTI: ICD-10-PCS | Mod: 59,,, | Performed by: INTERNAL MEDICINE

## 2021-06-09 PROCEDURE — 43239 EGD BIOPSY SINGLE/MULTIPLE: CPT | Performed by: INTERNAL MEDICINE

## 2021-06-09 PROCEDURE — 45385 PR COLONOSCOPY,REMV LESN,SNARE: ICD-10-PCS | Mod: 33,,, | Performed by: INTERNAL MEDICINE

## 2021-06-09 PROCEDURE — 88305 TISSUE EXAM BY PATHOLOGIST: CPT | Performed by: PATHOLOGY

## 2021-06-09 PROCEDURE — 25000003 PHARM REV CODE 250: Performed by: NURSE ANESTHETIST, CERTIFIED REGISTERED

## 2021-06-09 PROCEDURE — 45381 PR COLONOSCPY,FLEX,W/DIR SUBMUC INJECT: ICD-10-PCS | Mod: 51,,, | Performed by: INTERNAL MEDICINE

## 2021-06-09 PROCEDURE — 88305 TISSUE EXAM BY PATHOLOGIST: CPT | Mod: 26,,, | Performed by: PATHOLOGY

## 2021-06-09 PROCEDURE — 37000009 HC ANESTHESIA EA ADD 15 MINS: Performed by: INTERNAL MEDICINE

## 2021-06-09 PROCEDURE — D9220A PRA ANESTHESIA: ICD-10-PCS | Mod: ,,, | Performed by: ANESTHESIOLOGY

## 2021-06-09 PROCEDURE — 45385 COLONOSCOPY W/LESION REMOVAL: CPT | Mod: 33,,, | Performed by: INTERNAL MEDICINE

## 2021-06-09 PROCEDURE — 43251 EGD REMOVE LESION SNARE: CPT | Mod: ,,, | Performed by: INTERNAL MEDICINE

## 2021-06-09 PROCEDURE — 27201012 HC FORCEPS, HOT/COLD, DISP: Performed by: INTERNAL MEDICINE

## 2021-06-09 PROCEDURE — 45381 COLONOSCOPY SUBMUCOUS NJX: CPT | Performed by: INTERNAL MEDICINE

## 2021-06-09 PROCEDURE — 63600175 PHARM REV CODE 636 W HCPCS: Performed by: NURSE ANESTHETIST, CERTIFIED REGISTERED

## 2021-06-09 PROCEDURE — 27201089 HC SNARE, DISP (ANY): Performed by: INTERNAL MEDICINE

## 2021-06-09 PROCEDURE — 37000008 HC ANESTHESIA 1ST 15 MINUTES: Performed by: INTERNAL MEDICINE

## 2021-06-09 PROCEDURE — 27201028 HC NEEDLE, SCLERO: Performed by: INTERNAL MEDICINE

## 2021-06-09 PROCEDURE — 88305 TISSUE EXAM BY PATHOLOGIST: ICD-10-PCS | Mod: 26,,, | Performed by: PATHOLOGY

## 2021-06-09 PROCEDURE — 45385 COLONOSCOPY W/LESION REMOVAL: CPT | Performed by: INTERNAL MEDICINE

## 2021-06-09 PROCEDURE — 43239 EGD BIOPSY SINGLE/MULTIPLE: CPT | Mod: 59,,, | Performed by: INTERNAL MEDICINE

## 2021-06-09 PROCEDURE — 43251 EGD REMOVE LESION SNARE: CPT | Performed by: INTERNAL MEDICINE

## 2021-06-09 RX ORDER — PROPOFOL 10 MG/ML
VIAL (ML) INTRAVENOUS
Status: DISCONTINUED | OUTPATIENT
Start: 2021-06-09 | End: 2021-06-09

## 2021-06-09 RX ORDER — SODIUM CHLORIDE 9 MG/ML
INJECTION, SOLUTION INTRAVENOUS CONTINUOUS
Status: DISCONTINUED | OUTPATIENT
Start: 2021-06-09 | End: 2021-06-09 | Stop reason: HOSPADM

## 2021-06-09 RX ORDER — LIDOCAINE HCL/PF 100 MG/5ML
SYRINGE (ML) INTRAVENOUS
Status: DISCONTINUED | OUTPATIENT
Start: 2021-06-09 | End: 2021-06-09

## 2021-06-09 RX ADMIN — PROPOFOL 30 MG: 10 INJECTION, EMULSION INTRAVENOUS at 09:06

## 2021-06-09 RX ADMIN — LIDOCAINE HYDROCHLORIDE 100 MG: 20 INJECTION INTRAVENOUS at 09:06

## 2021-06-09 RX ADMIN — PROPOFOL 100 MG: 10 INJECTION, EMULSION INTRAVENOUS at 09:06

## 2021-06-09 RX ADMIN — PROPOFOL 50 MG: 10 INJECTION, EMULSION INTRAVENOUS at 09:06

## 2021-06-09 RX ADMIN — GLYCOPYRROLATE 0.2 MG: 0.2 INJECTION, SOLUTION INTRAMUSCULAR; INTRAVITREAL at 09:06

## 2021-06-10 ENCOUNTER — TELEPHONE (OUTPATIENT)
Dept: GASTROENTEROLOGY | Facility: CLINIC | Age: 66
End: 2021-06-10

## 2021-06-10 VITALS
DIASTOLIC BLOOD PRESSURE: 83 MMHG | HEART RATE: 84 BPM | WEIGHT: 175 LBS | BODY MASS INDEX: 30.04 KG/M2 | SYSTOLIC BLOOD PRESSURE: 155 MMHG | OXYGEN SATURATION: 97 % | TEMPERATURE: 98 F | RESPIRATION RATE: 16 BRPM

## 2021-06-14 ENCOUNTER — TELEPHONE (OUTPATIENT)
Dept: GASTROENTEROLOGY | Facility: CLINIC | Age: 66
End: 2021-06-14

## 2021-06-15 LAB
FINAL PATHOLOGIC DIAGNOSIS: NORMAL
GROSS: NORMAL
Lab: NORMAL

## 2021-06-16 ENCOUNTER — TELEPHONE (OUTPATIENT)
Dept: GASTROENTEROLOGY | Facility: CLINIC | Age: 66
End: 2021-06-16

## 2021-06-17 ENCOUNTER — PATIENT MESSAGE (OUTPATIENT)
Dept: GASTROENTEROLOGY | Facility: CLINIC | Age: 66
End: 2021-06-17

## 2021-06-21 ENCOUNTER — TELEPHONE (OUTPATIENT)
Dept: GASTROENTEROLOGY | Facility: CLINIC | Age: 66
End: 2021-06-21

## 2021-06-23 DIAGNOSIS — K21.9 GASTROESOPHAGEAL REFLUX DISEASE WITHOUT ESOPHAGITIS: ICD-10-CM

## 2021-06-23 RX ORDER — LANSOPRAZOLE 30 MG/1
CAPSULE, DELAYED RELEASE ORAL
Qty: 90 CAPSULE | Refills: 0 | OUTPATIENT
Start: 2021-06-23

## 2021-07-02 ENCOUNTER — TELEPHONE (OUTPATIENT)
Dept: FAMILY MEDICINE | Facility: CLINIC | Age: 66
End: 2021-07-02

## 2021-07-02 ENCOUNTER — TELEPHONE (OUTPATIENT)
Dept: GASTROENTEROLOGY | Facility: CLINIC | Age: 66
End: 2021-07-02

## 2021-07-02 ENCOUNTER — PATIENT MESSAGE (OUTPATIENT)
Dept: GASTROENTEROLOGY | Facility: CLINIC | Age: 66
End: 2021-07-02

## 2021-07-02 DIAGNOSIS — K21.9 GASTROESOPHAGEAL REFLUX DISEASE WITHOUT ESOPHAGITIS: Primary | ICD-10-CM

## 2021-07-02 RX ORDER — LANSOPRAZOLE 30 MG/1
30 TABLET, ORALLY DISINTEGRATING, DELAYED RELEASE ORAL DAILY
Qty: 30 TABLET | Refills: 2 | Status: SHIPPED | OUTPATIENT
Start: 2021-07-02 | End: 2021-09-13 | Stop reason: SDUPTHER

## 2021-07-06 ENCOUNTER — PATIENT MESSAGE (OUTPATIENT)
Dept: ADMINISTRATIVE | Facility: HOSPITAL | Age: 66
End: 2021-07-06

## 2021-07-15 ENCOUNTER — TELEPHONE (OUTPATIENT)
Dept: FAMILY MEDICINE | Facility: CLINIC | Age: 66
End: 2021-07-15

## 2021-07-28 ENCOUNTER — TELEPHONE (OUTPATIENT)
Dept: FAMILY MEDICINE | Facility: CLINIC | Age: 66
End: 2021-07-28

## 2021-07-28 DIAGNOSIS — F32.5 MAJOR DEPRESSIVE DISORDER WITH SINGLE EPISODE, IN FULL REMISSION: ICD-10-CM

## 2021-07-28 RX ORDER — DULOXETIN HYDROCHLORIDE 30 MG/1
CAPSULE, DELAYED RELEASE ORAL
Qty: 270 CAPSULE | Refills: 1 | OUTPATIENT
Start: 2021-07-28

## 2021-07-31 DIAGNOSIS — F32.5 MAJOR DEPRESSIVE DISORDER WITH SINGLE EPISODE, IN FULL REMISSION: ICD-10-CM

## 2021-08-02 ENCOUNTER — PATIENT MESSAGE (OUTPATIENT)
Dept: FAMILY MEDICINE | Facility: CLINIC | Age: 66
End: 2021-08-02

## 2021-08-02 ENCOUNTER — TELEPHONE (OUTPATIENT)
Dept: FAMILY MEDICINE | Facility: CLINIC | Age: 66
End: 2021-08-02

## 2021-08-02 RX ORDER — DULOXETIN HYDROCHLORIDE 30 MG/1
CAPSULE, DELAYED RELEASE ORAL
Qty: 270 CAPSULE | Refills: 0 | Status: SHIPPED | OUTPATIENT
Start: 2021-08-02 | End: 2021-08-31 | Stop reason: SDUPTHER

## 2021-08-24 ENCOUNTER — TELEPHONE (OUTPATIENT)
Dept: DERMATOLOGY | Facility: CLINIC | Age: 66
End: 2021-08-24

## 2021-08-25 ENCOUNTER — OFFICE VISIT (OUTPATIENT)
Dept: FAMILY MEDICINE | Facility: CLINIC | Age: 66
End: 2021-08-25
Payer: COMMERCIAL

## 2021-08-25 VITALS
SYSTOLIC BLOOD PRESSURE: 134 MMHG | TEMPERATURE: 99 F | DIASTOLIC BLOOD PRESSURE: 84 MMHG | HEIGHT: 64 IN | WEIGHT: 175.69 LBS | OXYGEN SATURATION: 97 % | BODY MASS INDEX: 29.99 KG/M2 | HEART RATE: 98 BPM

## 2021-08-25 DIAGNOSIS — L25.9 CONTACT DERMATITIS, UNSPECIFIED CONTACT DERMATITIS TYPE, UNSPECIFIED TRIGGER: Primary | ICD-10-CM

## 2021-08-25 DIAGNOSIS — E66.9 OBESITY (BMI 30.0-34.9): ICD-10-CM

## 2021-08-25 PROCEDURE — 96372 PR INJECTION,THERAP/PROPH/DIAG2ST, IM OR SUBCUT: ICD-10-PCS | Mod: S$GLB,,, | Performed by: NURSE PRACTITIONER

## 2021-08-25 PROCEDURE — 99213 OFFICE O/P EST LOW 20 MIN: CPT | Mod: 25,S$GLB,, | Performed by: NURSE PRACTITIONER

## 2021-08-25 PROCEDURE — 99999 PR PBB SHADOW E&M-EST. PATIENT-LVL III: ICD-10-PCS | Mod: PBBFAC,,, | Performed by: NURSE PRACTITIONER

## 2021-08-25 PROCEDURE — 99999 PR PBB SHADOW E&M-EST. PATIENT-LVL III: CPT | Mod: PBBFAC,,, | Performed by: NURSE PRACTITIONER

## 2021-08-25 PROCEDURE — 99213 PR OFFICE/OUTPT VISIT, EST, LEVL III, 20-29 MIN: ICD-10-PCS | Mod: 25,S$GLB,, | Performed by: NURSE PRACTITIONER

## 2021-08-25 PROCEDURE — 96372 THER/PROPH/DIAG INJ SC/IM: CPT | Mod: S$GLB,,, | Performed by: NURSE PRACTITIONER

## 2021-08-25 RX ORDER — TRIAMCINOLONE ACETONIDE 0.25 MG/G
CREAM TOPICAL 2 TIMES DAILY
Qty: 80 G | Refills: 1 | Status: SHIPPED | OUTPATIENT
Start: 2021-08-25 | End: 2021-09-13

## 2021-08-25 RX ORDER — HYDROXYZINE HYDROCHLORIDE 25 MG/1
25 TABLET, FILM COATED ORAL 3 TIMES DAILY PRN
Qty: 20 TABLET | Refills: 1 | Status: SHIPPED | OUTPATIENT
Start: 2021-08-25 | End: 2021-09-01

## 2021-08-25 RX ORDER — DEXAMETHASONE SODIUM PHOSPHATE 4 MG/ML
4 INJECTION, SOLUTION INTRA-ARTICULAR; INTRALESIONAL; INTRAMUSCULAR; INTRAVENOUS; SOFT TISSUE
Status: COMPLETED | OUTPATIENT
Start: 2021-08-25 | End: 2021-08-25

## 2021-08-25 RX ADMIN — DEXAMETHASONE SODIUM PHOSPHATE 4 MG: 4 INJECTION, SOLUTION INTRA-ARTICULAR; INTRALESIONAL; INTRAMUSCULAR; INTRAVENOUS; SOFT TISSUE at 12:08

## 2021-08-31 ENCOUNTER — HOSPITAL ENCOUNTER (EMERGENCY)
Facility: HOSPITAL | Age: 66
Discharge: HOME | End: 2021-08-31
Attending: EMERGENCY MEDICINE
Payer: COMMERCIAL

## 2021-08-31 VITALS
DIASTOLIC BLOOD PRESSURE: 88 MMHG | HEIGHT: 64 IN | SYSTOLIC BLOOD PRESSURE: 174 MMHG | OXYGEN SATURATION: 96 % | BODY MASS INDEX: 29.71 KG/M2 | TEMPERATURE: 98 F | HEART RATE: 92 BPM | RESPIRATION RATE: 18 BRPM | WEIGHT: 174 LBS

## 2021-08-31 DIAGNOSIS — R21 RASH: ICD-10-CM

## 2021-08-31 DIAGNOSIS — Z76.0 MEDICATION REFILL: ICD-10-CM

## 2021-08-31 DIAGNOSIS — L25.5 DERMATITIS DUE TO PLANTS, INCLUDING POISON IVY, SUMAC, AND OAK: ICD-10-CM

## 2021-08-31 DIAGNOSIS — L25.9 CONTACT DERMATITIS, UNSPECIFIED CONTACT DERMATITIS TYPE, UNSPECIFIED TRIGGER: Primary | ICD-10-CM

## 2021-08-31 DIAGNOSIS — F32.5 MAJOR DEPRESSIVE DISORDER WITH SINGLE EPISODE, IN FULL REMISSION: ICD-10-CM

## 2021-08-31 PROCEDURE — 99282 EMERGENCY DEPT VISIT SF MDM: CPT

## 2021-08-31 RX ORDER — MUPIROCIN 20 MG/G
OINTMENT TOPICAL 2 TIMES DAILY
Qty: 22 G | Refills: 0 | Status: SHIPPED | OUTPATIENT
Start: 2021-08-31 | End: 2021-09-11

## 2021-08-31 RX ORDER — CEPHALEXIN 500 MG/1
500 CAPSULE ORAL 4 TIMES DAILY
Qty: 20 CAPSULE | Refills: 0 | Status: SHIPPED | OUTPATIENT
Start: 2021-08-31 | End: 2021-09-06

## 2021-08-31 RX ORDER — DULOXETIN HYDROCHLORIDE 30 MG/1
90 CAPSULE, DELAYED RELEASE ORAL DAILY
Qty: 90 CAPSULE | Refills: 0 | Status: SHIPPED | OUTPATIENT
Start: 2021-08-31 | End: 2021-12-08

## 2021-08-31 RX ORDER — HYDROCORTISONE VALERATE 2 MG/G
OINTMENT TOPICAL 2 TIMES DAILY
Qty: 60 G | Refills: 0 | Status: SHIPPED | OUTPATIENT
Start: 2021-08-31 | End: 2021-09-13

## 2021-08-31 RX ORDER — METHYLPREDNISOLONE 4 MG/1
TABLET ORAL
Qty: 1 PACKAGE | Refills: 1 | Status: SHIPPED | OUTPATIENT
Start: 2021-08-31 | End: 2021-09-13

## 2021-09-01 ENCOUNTER — TELEPHONE (OUTPATIENT)
Dept: FAMILY MEDICINE | Facility: CLINIC | Age: 66
End: 2021-09-01

## 2021-09-01 DIAGNOSIS — F32.5 MAJOR DEPRESSIVE DISORDER WITH SINGLE EPISODE, IN FULL REMISSION: ICD-10-CM

## 2021-09-13 ENCOUNTER — OFFICE VISIT (OUTPATIENT)
Dept: FAMILY MEDICINE | Facility: CLINIC | Age: 66
End: 2021-09-13
Attending: FAMILY MEDICINE
Payer: COMMERCIAL

## 2021-09-13 VITALS
SYSTOLIC BLOOD PRESSURE: 120 MMHG | TEMPERATURE: 99 F | WEIGHT: 175.06 LBS | BODY MASS INDEX: 29.89 KG/M2 | HEIGHT: 64 IN | DIASTOLIC BLOOD PRESSURE: 76 MMHG | OXYGEN SATURATION: 97 % | HEART RATE: 98 BPM

## 2021-09-13 DIAGNOSIS — Z00.00 ENCOUNTER FOR PREVENTIVE HEALTH EXAMINATION: Primary | ICD-10-CM

## 2021-09-13 DIAGNOSIS — K21.9 GASTROESOPHAGEAL REFLUX DISEASE WITHOUT ESOPHAGITIS: ICD-10-CM

## 2021-09-13 DIAGNOSIS — F32.5 MAJOR DEPRESSIVE DISORDER WITH SINGLE EPISODE, IN FULL REMISSION: ICD-10-CM

## 2021-09-13 DIAGNOSIS — F41.9 ANXIETY: ICD-10-CM

## 2021-09-13 DIAGNOSIS — L25.9 CONTACT DERMATITIS, UNSPECIFIED CONTACT DERMATITIS TYPE, UNSPECIFIED TRIGGER: ICD-10-CM

## 2021-09-13 DIAGNOSIS — E78.5 HYPERLIPIDEMIA, UNSPECIFIED HYPERLIPIDEMIA TYPE: ICD-10-CM

## 2021-09-13 PROCEDURE — 99999 PR PBB SHADOW E&M-EST. PATIENT-LVL III: ICD-10-PCS | Mod: PBBFAC,,, | Performed by: FAMILY MEDICINE

## 2021-09-13 PROCEDURE — 99999 PR PBB SHADOW E&M-EST. PATIENT-LVL III: CPT | Mod: PBBFAC,,, | Performed by: FAMILY MEDICINE

## 2021-09-13 PROCEDURE — 99397 PR PREVENTIVE VISIT,EST,65 & OVER: ICD-10-PCS | Mod: S$GLB,,, | Performed by: FAMILY MEDICINE

## 2021-09-13 PROCEDURE — 99397 PER PM REEVAL EST PAT 65+ YR: CPT | Mod: S$GLB,,, | Performed by: FAMILY MEDICINE

## 2021-09-13 RX ORDER — LANSOPRAZOLE 30 MG/1
30 CAPSULE, DELAYED RELEASE ORAL DAILY
Qty: 90 CAPSULE | Refills: 3 | Status: SHIPPED | OUTPATIENT
Start: 2021-09-13 | End: 2022-09-13 | Stop reason: SDUPTHER

## 2021-09-29 DIAGNOSIS — Z12.31 OTHER SCREENING MAMMOGRAM: ICD-10-CM

## 2021-12-08 DIAGNOSIS — F32.5 MAJOR DEPRESSIVE DISORDER WITH SINGLE EPISODE, IN FULL REMISSION: ICD-10-CM

## 2021-12-08 RX ORDER — DULOXETIN HYDROCHLORIDE 30 MG/1
CAPSULE, DELAYED RELEASE ORAL
Qty: 90 CAPSULE | Refills: 5 | Status: SHIPPED | OUTPATIENT
Start: 2021-12-08 | End: 2022-06-28

## 2021-12-16 ENCOUNTER — IMMUNIZATION (OUTPATIENT)
Dept: PRIMARY CARE CLINIC | Facility: CLINIC | Age: 66
End: 2021-12-16
Payer: COMMERCIAL

## 2021-12-16 DIAGNOSIS — Z23 NEED FOR VACCINATION: Primary | ICD-10-CM

## 2021-12-16 PROCEDURE — 91300 COVID-19, MRNA, LNP-S, PF, 30 MCG/0.3 ML DOSE VACCINE: CPT | Mod: S$GLB,,, | Performed by: FAMILY MEDICINE

## 2021-12-16 PROCEDURE — 0004A COVID-19, MRNA, LNP-S, PF, 30 MCG/0.3 ML DOSE VACCINE: ICD-10-PCS | Mod: S$GLB,,, | Performed by: FAMILY MEDICINE

## 2021-12-16 PROCEDURE — 91300 COVID-19, MRNA, LNP-S, PF, 30 MCG/0.3 ML DOSE VACCINE: ICD-10-PCS | Mod: S$GLB,,, | Performed by: FAMILY MEDICINE

## 2021-12-16 PROCEDURE — 0004A COVID-19, MRNA, LNP-S, PF, 30 MCG/0.3 ML DOSE VACCINE: CPT | Mod: S$GLB,,, | Performed by: FAMILY MEDICINE

## 2022-03-08 ENCOUNTER — TELEPHONE (OUTPATIENT)
Dept: FAMILY MEDICINE | Facility: CLINIC | Age: 67
End: 2022-03-08
Payer: COMMERCIAL

## 2022-03-08 DIAGNOSIS — M79.672 LEFT FOOT PAIN: Primary | ICD-10-CM

## 2022-03-08 NOTE — TELEPHONE ENCOUNTER
----- Message from Haydee Connor sent at 3/8/2022 12:10 PM CST -----  Contact: pt  Who Called: PT  Regarding: The pt is calling to speak with the nurse in regards to a particular doctor the office may know about. Please contact the pt for additional information.   Would the patient rather a call back or a response via [facility]? Call back  Best Call Back Number: [phone]  Additional Information:

## 2022-03-08 NOTE — TELEPHONE ENCOUNTER
Pt states she thinks she has a foreign object in her foot possibly glass that has been there for many months. Pt states it is on the bottom of her left food and it is now callused over. Pt requesting referral to podiatry please advise

## 2022-05-05 ENCOUNTER — OFFICE VISIT (OUTPATIENT)
Dept: PODIATRY | Facility: CLINIC | Age: 67
End: 2022-05-05
Payer: COMMERCIAL

## 2022-05-05 ENCOUNTER — HOSPITAL ENCOUNTER (OUTPATIENT)
Dept: RADIOLOGY | Facility: CLINIC | Age: 67
Discharge: HOME | End: 2022-05-05
Attending: PODIATRIST
Payer: COMMERCIAL

## 2022-05-05 VITALS — OXYGEN SATURATION: 98 % | HEART RATE: 83 BPM | BODY MASS INDEX: 30.05 KG/M2 | HEIGHT: 64 IN

## 2022-05-05 DIAGNOSIS — M79.672 LEFT FOOT PAIN: ICD-10-CM

## 2022-05-05 DIAGNOSIS — S91.332A PUNCTURE WOUND OF LEFT FOOT, INITIAL ENCOUNTER: Primary | ICD-10-CM

## 2022-05-05 PROCEDURE — 1159F PR MEDICATION LIST DOCUMENTED IN MEDICAL RECORD: ICD-10-PCS | Mod: CPTII,S$GLB,, | Performed by: PODIATRIST

## 2022-05-05 PROCEDURE — 3288F PR FALLS RISK ASSESSMENT DOCUMENTED: ICD-10-PCS | Mod: CPTII,S$GLB,, | Performed by: PODIATRIST

## 2022-05-05 PROCEDURE — 73630 X-RAY EXAM OF FOOT: CPT | Mod: LT,S$GLB,, | Performed by: RADIOLOGY

## 2022-05-05 PROCEDURE — 1101F PR PT FALLS ASSESS DOC 0-1 FALLS W/OUT INJ PAST YR: ICD-10-PCS | Mod: CPTII,S$GLB,, | Performed by: PODIATRIST

## 2022-05-05 PROCEDURE — 1159F MED LIST DOCD IN RCRD: CPT | Mod: CPTII,S$GLB,, | Performed by: PODIATRIST

## 2022-05-05 PROCEDURE — 3008F BODY MASS INDEX DOCD: CPT | Mod: CPTII,S$GLB,, | Performed by: PODIATRIST

## 2022-05-05 PROCEDURE — 1101F PT FALLS ASSESS-DOCD LE1/YR: CPT | Mod: CPTII,S$GLB,, | Performed by: PODIATRIST

## 2022-05-05 PROCEDURE — 1160F PR REVIEW ALL MEDS BY PRESCRIBER/CLIN PHARMACIST DOCUMENTED: ICD-10-PCS | Mod: CPTII,S$GLB,, | Performed by: PODIATRIST

## 2022-05-05 PROCEDURE — 3288F FALL RISK ASSESSMENT DOCD: CPT | Mod: CPTII,S$GLB,, | Performed by: PODIATRIST

## 2022-05-05 PROCEDURE — 1160F RVW MEDS BY RX/DR IN RCRD: CPT | Mod: CPTII,S$GLB,, | Performed by: PODIATRIST

## 2022-05-05 PROCEDURE — 3008F PR BODY MASS INDEX (BMI) DOCUMENTED: ICD-10-PCS | Mod: CPTII,S$GLB,, | Performed by: PODIATRIST

## 2022-05-05 PROCEDURE — 1125F PR PAIN SEVERITY QUANTIFIED, PAIN PRESENT: ICD-10-PCS | Mod: CPTII,S$GLB,, | Performed by: PODIATRIST

## 2022-05-05 PROCEDURE — 1125F AMNT PAIN NOTED PAIN PRSNT: CPT | Mod: CPTII,S$GLB,, | Performed by: PODIATRIST

## 2022-05-05 PROCEDURE — 73630 XR FOOT COMPLETE 3 VIEW LEFT: ICD-10-PCS | Mod: LT,S$GLB,, | Performed by: RADIOLOGY

## 2022-05-05 PROCEDURE — 99203 OFFICE O/P NEW LOW 30 MIN: CPT | Mod: S$GLB,,, | Performed by: PODIATRIST

## 2022-05-05 PROCEDURE — 99203 PR OFFICE/OUTPT VISIT, NEW, LEVL III, 30-44 MIN: ICD-10-PCS | Mod: S$GLB,,, | Performed by: PODIATRIST

## 2022-05-05 RX ORDER — OXYCODONE AND ACETAMINOPHEN 5; 325 MG/1; MG/1
1 TABLET ORAL EVERY 6 HOURS PRN
COMMUNITY
Start: 2021-11-10 | End: 2022-05-05

## 2022-05-05 RX ORDER — DIAZEPAM 2 MG/1
TABLET ORAL
COMMUNITY
Start: 2021-11-11 | End: 2022-05-05

## 2022-05-05 NOTE — PROGRESS NOTES
"  [address] [facility] [address]  [place] [address]  Phone: [phone]   [phone]    Patient's PCP:Kenn Garnica MD  Referring Provider: Aaareferral Self    Subjective:      Chief Complaint:: Foot Pain (Left plantar foot, possible FB)    HPI  Gaby Donohue is a 67 y.o. female who presents today with a complaint of left plantar foot pain, possible FB, lasting for about 1 month ago. Onset of symptoms patient thinks she may have stepped on something while in the yard and reports no trauma.  Current symptoms include soreness, painful.  Aggravating factors are prolong walking/standing.  Treatment to date have included trying to dig in there to see if she could dig something out of her foot, and tried to soak her foot.    Vitals:    05/05/22 1657   Pulse: 83   SpO2: 98%   Height: 5' 4" (1.626 m)   PainSc:   4      Shoe Size: 9    Past Surgical History:   Procedure Laterality Date    CHOLECYSTECTOMY      COLONOSCOPY  4/20/12    Dr. Jaime, 5 year recheck    COLONOSCOPY N/A 6/9/2021    Procedure: COLONOSCOPY;  Surgeon: Yasmeen Goldman MD;  Location: [facility];  Service: Endoscopy;  Laterality: N/A;    ESOPHAGOGASTRODUODENOSCOPY  08/17/2012    Dr. Jaime; gastritis; bx unremarkable    ESOPHAGOGASTRODUODENOSCOPY N/A 6/9/2021    Procedure: EGD (ESOPHAGOGASTRODUODENOSCOPY);  Surgeon: Yasmeen Goldman MD;  Location: [facility];  Service: Endoscopy;  Laterality: N/A;    FRACTURE SURGERY  11/02/2013    left arm Dr Cooper    laporoscopy       Past Medical History:   Diagnosis Date    Anxiety     Cancer     skin ca on face    Depression     GERD (gastroesophageal reflux disease)     Hyperlipidemia     Wears glasses      Family History   Problem Relation Age of Onset    COPD Mother     Emphysema Mother     Heart disease Father     Cancer Sister         skin cancer face    Cancer Brother         skin cancer face     No Known Problems Daughter     Cancer Sister         skin cancer face    Cancer " Sister         skin cancer face     No Known Problems Brother     No Known Problems Daughter         Social History:   Marital Status:   Alcohol History:  reports current alcohol use.  Tobacco History:  reports that she quit smoking about 38 years ago. She has a 31.50 pack-year smoking history. She has never used smokeless tobacco.  Drug History:  reports no history of drug use.    Review of patient's allergies indicates:   Allergen Reactions    No known drug allergies        Current Outpatient Medications   Medication Sig Dispense Refill    DULoxetine (CYMBALTA) 30 MG capsule TAKE THREE CAPSULES (90MG) BY MOUTH ONCE DAILY 90 capsule 5    lansoprazole (PREVACID) 30 MG capsule Take 1 capsule (30 mg total) by mouth once daily. 90 capsule 3    ALPRAZolam (XANAX) 0.25 MG tablet Take 1 tablet (0.25 mg total) by mouth 3 (three) times daily as needed (anxiety regarding flying). 20 tablet 0     No current facility-administered medications for this visit.       Review of Systems   Constitutional: Negative for chills, fatigue, fever and unexpected weight change.   HENT: Negative for hearing loss and trouble swallowing.    Eyes: Negative for photophobia and visual disturbance.   Respiratory: Negative for cough, shortness of breath and wheezing.    Cardiovascular: Negative for chest pain, palpitations and leg swelling.   Gastrointestinal: Negative for abdominal pain and nausea.   Genitourinary: Negative for dysuria and frequency.   Musculoskeletal: Positive for gait problem. Negative for arthralgias, back pain, joint swelling and myalgias.   Skin: Negative for rash and wound.   Neurological: Negative for tremors, seizures, weakness, numbness and headaches.   Hematological: Does not bruise/bleed easily.         Objective:        Physical Exam:   Foot Exam    General  General Appearance: appears stated age and healthy   Orientation: alert and oriented to person, place, and time   Affect: appropriate   Gait:  unimpaired and antalgic       Left Foot/Ankle      Inspection and Palpation  Ecchymosis: none  Tenderness: none (Plantar foot)  Swelling: none   Arch: normal  Hammertoes: absent  Claw toes: absent  Hallux valgus: no  Hallux limitus: no  Skin Exam: callus; no drainage, no ulcer and no erythema   Neurovascular  Dorsalis pedis: 2+  Posterior tibial: 2+  Capillary refill: 2+  Varicose veins: not present  Saphenous nerve sensation: normal  Tibial nerve sensation: normal  Superficial peroneal nerve sensation: normal  Deep peroneal nerve sensation: normal  Sural nerve sensation: normal    Muscle Strength  Ankle dorsiflexion: 5  Ankle plantar flexion: 5  Ankle inversion: 5  Ankle eversion: 5  Great toe extension: 5  Great toe flexion: 5    Range of Motion    Normal left ankle ROM    Tests  Anterior drawer: negative   Talar tilt: negative   PT Tinel's sign: negative  Paresthesia: negative        Physical Exam  Cardiovascular:      Pulses:           Dorsalis pedis pulses are 2+ on the left side.        Posterior tibial pulses are 2+ on the left side.   Musculoskeletal:      Left foot: No bunion.        Feet:    Feet:      Left foot:      Skin integrity: Callus present. No ulcer or erythema.               Left Ankle/Foot Exam     Range of Motion   The patient has normal left ankle ROM.       Muscle Strength   Left Lower Extremity   Ankle Dorsiflexion:  5   Plantar flexion:  5/5     Vascular Exam       Left Pulses  Dorsalis Pedis:      2+  Posterior Tibial:      2+             Imaging: X-Ray Foot Complete Left  Narrative: EXAMINATION:  XR FOOT COMPLETE 3 VIEW LEFT    CLINICAL HISTORY:  possibly FB in left plantar foot;.  Pain in left foot    TECHNIQUE:  AP, lateral and oblique views of the left foot were performed.    COMPARISON:  02/24/2011    FINDINGS:  Plantar calcaneal spur.  Very tiny enthesophytes at site of insertion of the Achilles tendon.  Mild hallux valgus.  Bunion formation 1st metatarsal head.  No acute fracture or  dislocation.  As visualized no radiopaque foreign body seen in the soft tissues.  Plantar aspect of the foot is not fully included in visualized on the lateral view.  Findings appears similar to the prior exam  Impression: As above    Electronically signed by: Andreia Henry MD  Date:    05/05/2022  Time:    18:40               Assessment:       1. Puncture wound of left foot, initial encounter    2. Left foot pain      Plan:   Puncture wound of left foot, initial encounter    Left foot pain  -     X-Ray Foot Complete Left      Follow up if symptoms worsen or fail to improve.    Procedures        I debrided the callus overlying the area of concern as a courtesy today.  I discussed with the patient that on x-ray and physical exam I see no definitive foreign body.  We will see if now that the callus has been removed if her symptoms improve.  I gave the patient some accommodative pads to decrease pressure on the area.  I discussed there that if her symptoms remain over the next several weeks then we will consider further imaging likely an ultrasound for further evaluation of the area.    Counseling:     I provided patient education verbally regarding:   Patient diagnosis, treatment options, as well as alternatives, risks, and benefits.     This note was created using Dragon voice recognition software that occasionally misinterpreted phrases or words.

## 2022-05-23 ENCOUNTER — TELEPHONE (OUTPATIENT)
Dept: FAMILY MEDICINE | Facility: CLINIC | Age: 67
End: 2022-05-23
Payer: COMMERCIAL

## 2022-05-23 DIAGNOSIS — F41.8 SITUATIONAL ANXIETY: ICD-10-CM

## 2022-05-23 RX ORDER — ALPRAZOLAM 0.25 MG/1
TABLET ORAL
Qty: 20 TABLET | Refills: 0 | Status: SHIPPED | OUTPATIENT
Start: 2022-05-23 | End: 2022-12-15 | Stop reason: SDUPTHER

## 2022-05-23 NOTE — TELEPHONE ENCOUNTER
----- Message from Mary Ortiz sent at 5/23/2022  8:38 AM CDT -----  Contact: patient  Type: Needs Medical Advice  Who Called:  patient  Best Call Back Number: [phone] (home)   Additional Information: patient is requesting a call back- she has a question about her medication.

## 2022-05-23 NOTE — TELEPHONE ENCOUNTER
Patient flying to [place] Wednesday to visit daughter. Asking  for some Xanax for her anxiety with flying.

## 2022-05-31 ENCOUNTER — PATIENT MESSAGE (OUTPATIENT)
Dept: ADMINISTRATIVE | Facility: HOSPITAL | Age: 67
End: 2022-05-31
Payer: COMMERCIAL

## 2022-06-10 ENCOUNTER — TELEPHONE (OUTPATIENT)
Dept: FAMILY MEDICINE | Facility: CLINIC | Age: 67
End: 2022-06-10
Payer: COMMERCIAL

## 2022-06-10 NOTE — TELEPHONE ENCOUNTER
----- Message from Andry Watson sent at 6/10/2022 10:33 AM CDT -----  Type: Patient Call Back         Who called:Pt          What is the request in detail: Pt called in regarding a few questions and concerns          Can the clinic reply by [facility]?no          Would the patient rather a call back or a response via [facility]?call back          Best call back number:[phone] (mobile)          Additional Information:           Thank You

## 2022-06-10 NOTE — TELEPHONE ENCOUNTER
Spoke with patient she states that she had talked with you about her brother.  She stated that you recommend two doctors for him to see and she can't remember the names.  He is new to [place] and is looking for a doctor.

## 2022-06-27 ENCOUNTER — TELEPHONE (OUTPATIENT)
Dept: FAMILY MEDICINE | Facility: CLINIC | Age: 67
End: 2022-06-27
Payer: COMMERCIAL

## 2022-06-27 DIAGNOSIS — F32.5 MAJOR DEPRESSIVE DISORDER WITH SINGLE EPISODE, IN FULL REMISSION: ICD-10-CM

## 2022-06-27 NOTE — TELEPHONE ENCOUNTER
No new care gaps identified.  [facility] Embedded Care Gaps. Reference number: [id number]. 6/27/2022   3:21:15 PM CDT

## 2022-06-27 NOTE — TELEPHONE ENCOUNTER
----- Message from Laura Gonsalves sent at 6/27/2022  4:43 PM CDT -----  Contact: Pt  Type:  RX Refill Request    Who Called:  Pt    Refill or New Rx:  Refill    RX Name and Strength:  DULoxetine (CYMBALTA) 30 MG capsule    How is the patient currently taking it? (ex. 1XDay):  1 x day    Is this a 30 day or 90 day RX:  30    Preferred Pharmacy with phone number:    [id number] - [place], [place] - [address] [address]  Phone: [phone] Fax: [phone]    Best Call Back Number:  [phone]    Additional Information:  Please call back.  Thanks.

## 2022-06-28 RX ORDER — DULOXETIN HYDROCHLORIDE 30 MG/1
CAPSULE, DELAYED RELEASE ORAL
Qty: 90 CAPSULE | Refills: 2 | Status: SHIPPED | OUTPATIENT
Start: 2022-06-28 | End: 2022-11-09

## 2022-06-28 NOTE — TELEPHONE ENCOUNTER
Refill Routing Note   Medication(s) are not appropriate for processing by [facility] for the following reason(s):      - Required laboratory values are outdated    [facility] action(s):  Defer          Medication reconciliation completed: No     Appointments  past 12m or future 3m with PCP    Date Provider   Last Visit   9/13/2021 Kenn Garnica MD   Next Visit   6/27/2022 Kenn Garnica MD   ED visits in past 90 days: 0        Note composed:11:24 AM 06/28/2022

## 2022-07-11 ENCOUNTER — IMMUNIZATION (OUTPATIENT)
Dept: PRIMARY CARE CLINIC | Facility: CLINIC | Age: 67
End: 2022-07-11
Payer: COMMERCIAL

## 2022-07-11 DIAGNOSIS — Z23 NEED FOR VACCINATION: Primary | ICD-10-CM

## 2022-07-11 PROCEDURE — 91305 COVID-19, MRNA, LNP-S, PF, 30 MCG/0.3 ML DOSE VACCINE (PFIZER): CPT | Mod: S$GLB,,, | Performed by: FAMILY MEDICINE

## 2022-07-11 PROCEDURE — 0054A COVID-19, MRNA, LNP-S, PF, 30 MCG/0.3 ML DOSE VACCINE (PFIZER): CPT | Mod: S$GLB,,, | Performed by: FAMILY MEDICINE

## 2022-07-11 PROCEDURE — 91305 COVID-19, MRNA, LNP-S, PF, 30 MCG/0.3 ML DOSE VACCINE (PFIZER): ICD-10-PCS | Mod: S$GLB,,, | Performed by: FAMILY MEDICINE

## 2022-07-11 PROCEDURE — 0054A COVID-19, MRNA, LNP-S, PF, 30 MCG/0.3 ML DOSE VACCINE (PFIZER): ICD-10-PCS | Mod: S$GLB,,, | Performed by: FAMILY MEDICINE

## 2022-08-24 ENCOUNTER — PATIENT MESSAGE (OUTPATIENT)
Dept: ADMINISTRATIVE | Facility: HOSPITAL | Age: 67
End: 2022-08-24
Payer: COMMERCIAL

## 2022-08-31 DIAGNOSIS — Z78.0 MENOPAUSE: ICD-10-CM

## 2022-09-12 DIAGNOSIS — Z12.31 ENCOUNTER FOR SCREENING MAMMOGRAM FOR MALIGNANT NEOPLASM OF BREAST: Primary | ICD-10-CM

## 2022-09-13 ENCOUNTER — OFFICE VISIT (OUTPATIENT)
Dept: FAMILY MEDICINE | Facility: CLINIC | Age: 67
End: 2022-09-13
Attending: FAMILY MEDICINE
Payer: COMMERCIAL

## 2022-09-13 VITALS
RESPIRATION RATE: 17 BRPM | OXYGEN SATURATION: 96 % | SYSTOLIC BLOOD PRESSURE: 126 MMHG | HEIGHT: 64 IN | DIASTOLIC BLOOD PRESSURE: 90 MMHG | BODY MASS INDEX: 29.57 KG/M2 | HEART RATE: 85 BPM | TEMPERATURE: 98 F | WEIGHT: 173.19 LBS

## 2022-09-13 DIAGNOSIS — Z00.00 ENCOUNTER FOR PREVENTIVE HEALTH EXAMINATION: Primary | ICD-10-CM

## 2022-09-13 DIAGNOSIS — Z78.0 MENOPAUSE: ICD-10-CM

## 2022-09-13 DIAGNOSIS — F32.5 MAJOR DEPRESSIVE DISORDER WITH SINGLE EPISODE, IN FULL REMISSION: ICD-10-CM

## 2022-09-13 DIAGNOSIS — K21.9 GASTROESOPHAGEAL REFLUX DISEASE WITHOUT ESOPHAGITIS: ICD-10-CM

## 2022-09-13 DIAGNOSIS — E78.5 HYPERLIPIDEMIA, UNSPECIFIED HYPERLIPIDEMIA TYPE: ICD-10-CM

## 2022-09-13 DIAGNOSIS — R03.0 ELEVATED BLOOD PRESSURE READING IN OFFICE WITH WHITE COAT SYNDROME, WITHOUT DIAGNOSIS OF HYPERTENSION: ICD-10-CM

## 2022-09-13 PROCEDURE — 99397 PR PREVENTIVE VISIT,EST,65 & OVER: ICD-10-PCS | Mod: S$GLB,,, | Performed by: FAMILY MEDICINE

## 2022-09-13 PROCEDURE — 1160F RVW MEDS BY RX/DR IN RCRD: CPT | Mod: CPTII,S$GLB,, | Performed by: FAMILY MEDICINE

## 2022-09-13 PROCEDURE — 3008F BODY MASS INDEX DOCD: CPT | Mod: CPTII,S$GLB,, | Performed by: FAMILY MEDICINE

## 2022-09-13 PROCEDURE — 3080F PR MOST RECENT DIASTOLIC BLOOD PRESSURE >= 90 MM HG: ICD-10-PCS | Mod: CPTII,S$GLB,, | Performed by: FAMILY MEDICINE

## 2022-09-13 PROCEDURE — 3080F DIAST BP >= 90 MM HG: CPT | Mod: CPTII,S$GLB,, | Performed by: FAMILY MEDICINE

## 2022-09-13 PROCEDURE — 3288F FALL RISK ASSESSMENT DOCD: CPT | Mod: CPTII,S$GLB,, | Performed by: FAMILY MEDICINE

## 2022-09-13 PROCEDURE — 99999 PR PBB SHADOW E&M-EST. PATIENT-LVL IV: CPT | Mod: PBBFAC,,, | Performed by: FAMILY MEDICINE

## 2022-09-13 PROCEDURE — 1159F PR MEDICATION LIST DOCUMENTED IN MEDICAL RECORD: ICD-10-PCS | Mod: CPTII,S$GLB,, | Performed by: FAMILY MEDICINE

## 2022-09-13 PROCEDURE — 3288F PR FALLS RISK ASSESSMENT DOCUMENTED: ICD-10-PCS | Mod: CPTII,S$GLB,, | Performed by: FAMILY MEDICINE

## 2022-09-13 PROCEDURE — 1101F PT FALLS ASSESS-DOCD LE1/YR: CPT | Mod: CPTII,S$GLB,, | Performed by: FAMILY MEDICINE

## 2022-09-13 PROCEDURE — 1126F PR PAIN SEVERITY QUANTIFIED, NO PAIN PRESENT: ICD-10-PCS | Mod: CPTII,S$GLB,, | Performed by: FAMILY MEDICINE

## 2022-09-13 PROCEDURE — 1159F MED LIST DOCD IN RCRD: CPT | Mod: CPTII,S$GLB,, | Performed by: FAMILY MEDICINE

## 2022-09-13 PROCEDURE — 99999 PR PBB SHADOW E&M-EST. PATIENT-LVL IV: ICD-10-PCS | Mod: PBBFAC,,, | Performed by: FAMILY MEDICINE

## 2022-09-13 PROCEDURE — 3074F PR MOST RECENT SYSTOLIC BLOOD PRESSURE < 130 MM HG: ICD-10-PCS | Mod: CPTII,S$GLB,, | Performed by: FAMILY MEDICINE

## 2022-09-13 PROCEDURE — 1126F AMNT PAIN NOTED NONE PRSNT: CPT | Mod: CPTII,S$GLB,, | Performed by: FAMILY MEDICINE

## 2022-09-13 PROCEDURE — 99397 PER PM REEVAL EST PAT 65+ YR: CPT | Mod: S$GLB,,, | Performed by: FAMILY MEDICINE

## 2022-09-13 PROCEDURE — 1160F PR REVIEW ALL MEDS BY PRESCRIBER/CLIN PHARMACIST DOCUMENTED: ICD-10-PCS | Mod: CPTII,S$GLB,, | Performed by: FAMILY MEDICINE

## 2022-09-13 PROCEDURE — 3074F SYST BP LT 130 MM HG: CPT | Mod: CPTII,S$GLB,, | Performed by: FAMILY MEDICINE

## 2022-09-13 PROCEDURE — 1101F PR PT FALLS ASSESS DOC 0-1 FALLS W/OUT INJ PAST YR: ICD-10-PCS | Mod: CPTII,S$GLB,, | Performed by: FAMILY MEDICINE

## 2022-09-13 PROCEDURE — 3008F PR BODY MASS INDEX (BMI) DOCUMENTED: ICD-10-PCS | Mod: CPTII,S$GLB,, | Performed by: FAMILY MEDICINE

## 2022-09-13 RX ORDER — LANSOPRAZOLE 30 MG/1
30 CAPSULE, DELAYED RELEASE ORAL DAILY
Qty: 90 CAPSULE | Refills: 3 | Status: SHIPPED | OUTPATIENT
Start: 2022-09-13 | End: 2022-09-16 | Stop reason: SDUPTHER

## 2022-09-13 NOTE — PROGRESS NOTES
Subjective:       Patient ID: Gaby Donohue is a 67 y.o. female.    Chief Complaint: Annual Exam (Pt states that she is here for her annual exam )    67-year-old female coming in for annual exam.  She has a history of major depression in remission on duloxetine 90 mg daily, hyperlipidemia on diet control, reflux without esophagitis controlled by Prevacid and has a limited supply of Xanax that she uses when flying.  She has no complaints at this time.  She has not been checking her blood pressure which tends to run high when in medical settings.  She does not have hypertension and does not take medications.  She was mildly elevated on presentation and did come down on the systolic reading but the diastolic remained borderline.    Past Medical History:  No date: Anxiety  No date: Cancer      Comment:  skin ca on face  No date: Depression  No date: GERD (gastroesophageal reflux disease)  No date: Hyperlipidemia  No date: Wears glasses    Past Surgical History:  No date: CHOLECYSTECTOMY  12: COLONOSCOPY      Comment:  Dr. Jaime, 5 year recheck  2021: COLONOSCOPY; N/A      Comment:  Procedure: COLONOSCOPY;  Surgeon: Yasmeen Goldman MD;               Location: [facility];  Service: Endoscopy;  Laterality:                N/A;  2012: ESOPHAGOGASTRODUODENOSCOPY      Comment:  Dr. Jaime; gastritis; bx unremarkable  2021: ESOPHAGOGASTRODUODENOSCOPY; N/A      Comment:  Procedure: EGD (ESOPHAGOGASTRODUODENOSCOPY);  Surgeon:                Yasmeen Goldman MD;  Location: [facility];  Service:                Endoscopy;  Laterality: N/A;  2013: FRACTURE SURGERY      Comment:  left arm Dr Cooper  No date: laporoscopy    Review of patient's family history indicates:    Social History    Tobacco Use      Smoking status: Former        Packs/day: 1.50        Years: 21.00        Pack years: 31.5        Types: Cigarettes        Quit date: 1984        Years since quittin.4      Smokeless tobacco:  Never    Alcohol use: Yes      Comment: occ    Drug use: No    Current Outpatient Medications on File Prior to Visit:  ALPRAZolam (XANAX) 0.25 MG tablet, Take one up to three times daily 30 minutes before takeoff for anxiety with flying, Disp: 20 tablet, Rfl: 0  DULoxetine (CYMBALTA) 30 MG capsule, TAKE THREE CAPSULES (90MG) BY MOUTH ONCE DAILY, Disp: 90 capsule, Rfl: 2  [DISCONTINUED] lansoprazole (PREVACID) 30 MG capsule, Take 1 capsule (30 mg total) by mouth once daily., Disp: 90 capsule, Rfl: 3    No current facility-administered medications on file prior to visit.    She is scheduled for her pelvic exam and Pap smear with Dr. Yudy LOZANO believe in early October.  Her mammogram is scheduled September 22nd at 8:00 a.m. at [facility].  She is due for a DEXA scan which will be ordered and she can try to schedule it with her mammogram if possible.    Review of Systems   Constitutional:  Negative for chills, diaphoresis, fatigue, fever and unexpected weight change.   HENT:  Negative for congestion, ear pain, hearing loss, postnasal drip and sinus pressure.    Eyes:  Negative for itching and visual disturbance.   Respiratory:  Negative for cough, chest tightness, shortness of breath and wheezing.    Cardiovascular:  Negative for chest pain, palpitations and leg swelling.   Gastrointestinal:  Negative for abdominal pain, blood in stool, constipation, diarrhea, nausea and vomiting.   Genitourinary:  Negative for dysuria, frequency and hematuria.   Musculoskeletal:  Negative for arthralgias, back pain, joint swelling and myalgias.   Neurological:  Negative for dizziness and headaches.   Hematological:  Negative for adenopathy.   Psychiatric/Behavioral:  Negative for sleep disturbance. The patient is not nervous/anxious.      Objective:      Physical Exam  Vitals and nursing note reviewed.   Constitutional:       General: She is not in acute distress.     Appearance: Normal appearance. She is well-developed. She is not  ill-appearing, toxic-appearing or diaphoretic.      Comments: Borderline blood pressure, improved slightly  Overweight with a BMI of 29.7 she is down 2 lb from her last physical September 13, 2021   HENT:      Head: Normocephalic and atraumatic.      Right Ear: Tympanic membrane, ear canal and external ear normal. There is no impacted cerumen.      Left Ear: Tympanic membrane, ear canal and external ear normal. There is no impacted cerumen.      Nose: Nose normal. No congestion or rhinorrhea.      Mouth/Throat:      Mouth: Mucous membranes are moist.      Pharynx: Oropharynx is clear. No oropharyngeal exudate or posterior oropharyngeal erythema.   Eyes:      General: No scleral icterus.        Right eye: No discharge.         Left eye: No discharge.      Extraocular Movements: Extraocular movements intact.      Conjunctiva/sclera: Conjunctivae normal.      Pupils: Pupils are equal, round, and reactive to light.   Neck:      Thyroid: No thyromegaly.      Vascular: No carotid bruit or JVD.   Cardiovascular:      Rate and Rhythm: Normal rate and regular rhythm.      Pulses: Normal pulses.      Heart sounds: Normal heart sounds. No murmur heard.    No friction rub. No gallop.   Pulmonary:      Effort: Pulmonary effort is normal. No respiratory distress.      Breath sounds: Normal breath sounds. No stridor. No wheezing, rhonchi or rales.   Chest:      Chest wall: No tenderness.   Abdominal:      General: Bowel sounds are normal. There is no distension.      Palpations: Abdomen is soft. There is no mass.      Tenderness: There is no abdominal tenderness. There is no guarding or rebound.      Hernia: No hernia is present.   Musculoskeletal:         General: No swelling, tenderness, deformity or signs of injury. Normal range of motion.      Cervical back: Normal range of motion and neck supple. No rigidity or tenderness.      Right lower leg: No edema.      Left lower leg: No edema.   Lymphadenopathy:      Cervical: No  cervical adenopathy.   Skin:     General: Skin is warm and dry.      Coloration: Skin is not jaundiced or pale.      Findings: No bruising, erythema, lesion or rash.   Neurological:      General: No focal deficit present.      Mental Status: She is alert and oriented to person, place, and time. Mental status is at baseline.      Cranial Nerves: No cranial nerve deficit.      Sensory: No sensory deficit.      Motor: No weakness.      Coordination: Coordination normal.      Gait: Gait normal.      Deep Tendon Reflexes: Reflexes are normal and symmetric. Reflexes normal.   Psychiatric:         Mood and Affect: Mood normal.         Behavior: Behavior normal.         Thought Content: Thought content normal.         Judgment: Judgment normal.       Assessment:       1. Encounter for preventive health examination    2. Hyperlipidemia, unspecified hyperlipidemia type    3. Major depressive disorder with single episode, in full remission    4. Gastroesophageal reflux disease without esophagitis    5. Elevated blood pressure reading in office with white coat syndrome, without diagnosis of hypertension    6. Menopause    7. BMI 29.0-29.9,adult          Plan:       1. Encounter for preventive health examination  - Comprehensive Metabolic Panel; Future  - Lipid Panel; Future  - CBC Auto Differential; Future  - TSH; Future    2. Hyperlipidemia, unspecified hyperlipidemia type  Await lipid panel results  - Comprehensive Metabolic Panel; Future  - Lipid Panel; Future    3. Major depressive disorder with single episode, in full remission  Stable on Cymbalta    4. Gastroesophageal reflux disease without esophagitis  Asymptomatic, occasionally takes a 2nd Prevacid  - lansoprazole (PREVACID) 30 MG capsule; Take 1 capsule (30 mg total) by mouth once daily.  Dispense: 90 capsule; Refill: 3    5. Elevated blood pressure reading in office with white coat syndrome, without diagnosis of hypertension  Patient instructed to get 3 to 4 readings  over the next week at home when she is relaxed and call those to me  - Comprehensive Metabolic Panel; Future  - Lipid Panel; Future  - CBC Auto Differential; Future    6. Menopause  Bone density ordered  - DXA Bone Density Spine And Hip; Future  - DXA Bone Density Spine And Hip    7. BMI 29.0-29.9,adult

## 2022-09-16 DIAGNOSIS — K21.9 GASTROESOPHAGEAL REFLUX DISEASE WITHOUT ESOPHAGITIS: ICD-10-CM

## 2022-09-16 RX ORDER — LANSOPRAZOLE 30 MG/1
30 CAPSULE, DELAYED RELEASE ORAL DAILY
Qty: 30 CAPSULE | Refills: 11 | Status: SHIPPED | OUTPATIENT
Start: 2022-09-16 | End: 2023-01-11 | Stop reason: CLARIF

## 2022-09-16 NOTE — TELEPHONE ENCOUNTER
No new care gaps identified.  [facility] Embedded Care Gaps. Reference number: [id number]. 9/16/2022   9:02:28 AM NEFTALIT

## 2022-09-16 NOTE — TELEPHONE ENCOUNTER
Per pharmacy patient's insurance will not cover a 90 day supply of lansoprazole.  Will cover a 30 day supply.

## 2022-09-20 ENCOUNTER — TELEPHONE (OUTPATIENT)
Dept: FAMILY MEDICINE | Facility: CLINIC | Age: 67
End: 2022-09-20
Payer: COMMERCIAL

## 2022-09-20 NOTE — TELEPHONE ENCOUNTER
Called patient- received n/a response back from cover my meds on the prior auth for the Lansoprazole. Patient says she picked up refill with no problems. She will see what is covered and let us know if a change is needed.

## 2022-10-07 ENCOUNTER — HOSPITAL ENCOUNTER (OUTPATIENT)
Dept: RADIOLOGY | Facility: HOSPITAL | Age: 67
Discharge: HOME | End: 2022-10-07
Attending: FAMILY MEDICINE
Payer: COMMERCIAL

## 2022-10-07 ENCOUNTER — HOSPITAL ENCOUNTER (OUTPATIENT)
Dept: RADIOLOGY | Facility: HOSPITAL | Age: 67
Discharge: HOME | End: 2022-10-07
Attending: OBSTETRICS & GYNECOLOGY
Payer: COMMERCIAL

## 2022-10-07 DIAGNOSIS — Z12.31 ENCOUNTER FOR SCREENING MAMMOGRAM FOR MALIGNANT NEOPLASM OF BREAST: ICD-10-CM

## 2022-10-07 PROCEDURE — 77080 DXA BONE DENSITY AXIAL: CPT | Mod: TC,PO

## 2022-10-07 PROCEDURE — 77063 BREAST TOMOSYNTHESIS BI: CPT | Mod: TC,PO

## 2022-10-12 ENCOUNTER — TELEPHONE (OUTPATIENT)
Dept: DERMATOLOGY | Facility: CLINIC | Age: 67
End: 2022-10-12
Payer: COMMERCIAL

## 2022-10-12 NOTE — TELEPHONE ENCOUNTER
LM for patient. We do not have a referral and she needs to have them send something over to us to schedule. Gave patient direct phone number [phone]        ----- Message from Geovanna Hinojosa sent at 10/12/2022  2:45 PM CDT -----  Contact: patient  Type:  Patient Call          Who Called:patient         Does the patient know what this is regarding?: requesting a call back to have a appt scheduled for a skin cancer that she has ;please advise           Would the patient rather a call back or a response via [facility]? Both           Best Call Back Number: [phone]             Additional Information:

## 2022-10-12 NOTE — TELEPHONE ENCOUNTER
LM for patient to call office directly and make appt        ----- Message from Umm Hernandez LPN sent at 10/12/2022  2:55 PM CDT -----  Bx site photo for [facility]

## 2022-10-13 ENCOUNTER — TELEPHONE (OUTPATIENT)
Dept: DERMATOLOGY | Facility: CLINIC | Age: 67
End: 2022-10-13
Payer: COMMERCIAL

## 2022-10-17 ENCOUNTER — HOSPITAL ENCOUNTER (OUTPATIENT)
Dept: RADIOLOGY | Facility: HOSPITAL | Age: 67
Discharge: HOME | End: 2022-10-17
Attending: OBSTETRICS & GYNECOLOGY
Payer: COMMERCIAL

## 2022-10-17 ENCOUNTER — TELEPHONE (OUTPATIENT)
Dept: DERMATOLOGY | Facility: CLINIC | Age: 67
End: 2022-10-17
Payer: COMMERCIAL

## 2022-10-17 DIAGNOSIS — R92.2 INCONCLUSIVE MAMMOGRAM: ICD-10-CM

## 2022-10-17 PROCEDURE — 77065 DX MAMMO INCL CAD UNI: CPT | Mod: TC,PO,RT

## 2022-10-17 NOTE — TELEPHONE ENCOUNTER
----- Message from Laura Gonsalves sent at 10/17/2022  4:51 PM CDT -----  Contact: Pt  Type: Referral    Who Called:  Pt    Best Call Back Number: [phone]    Additional Information: Please call back regarding referral from Dr. Hardy's office.  Thanks.

## 2022-10-18 ENCOUNTER — TELEPHONE (OUTPATIENT)
Dept: DERMATOLOGY | Facility: CLINIC | Age: 67
End: 2022-10-18
Payer: COMMERCIAL

## 2022-11-09 ENCOUNTER — TELEPHONE (OUTPATIENT)
Dept: FAMILY MEDICINE | Facility: CLINIC | Age: 67
End: 2022-11-09
Payer: COMMERCIAL

## 2022-11-09 NOTE — TELEPHONE ENCOUNTER
----- Message from Estrellita Kim, Patient Care Assistant sent at 11/9/2022  2:12 PM CST -----  Regarding: refill  Contact: pt  Type:  RX Refill Request    Who Called:  pt   Refill or New Rx:  refill  RX Name and Strength:  DULoxetine (CYMBALTA) 30 MG capsule    How is the patient currently taking it? 1xday   Is this a 30 day or 90 day RX:  90    Preferred Pharmacy with phone number:    [facility] - [place] - [address] [facility]  [address] [place] [address]  Phone: [phone] Fax: [phone]    Local or Mail Order:  local   Ordering Provider:  Danika Brantley Call Back Number:  [phone] (home)     Additional Information:  please call pt to advise. Thanks!

## 2022-11-25 ENCOUNTER — TELEPHONE (OUTPATIENT)
Dept: DERMATOLOGY | Facility: CLINIC | Age: 67
End: 2022-11-25
Payer: COMMERCIAL

## 2022-11-25 NOTE — TELEPHONE ENCOUNTER
Left  message for patient to call me back to reschedule the consult for the bcc right temple. I left our direct line for her to call me back. Aye

## 2022-11-29 ENCOUNTER — TELEPHONE (OUTPATIENT)
Dept: DERMATOLOGY | Facility: CLINIC | Age: 67
End: 2022-11-29
Payer: COMMERCIAL

## 2022-11-29 NOTE — TELEPHONE ENCOUNTER
Called patient could not leave a message because her mail box is full. I called  her  number and left a message for him to have his wife call us at [phone].

## 2022-12-15 ENCOUNTER — OFFICE VISIT (OUTPATIENT)
Dept: DERMATOLOGY | Facility: CLINIC | Age: 67
End: 2022-12-15
Payer: COMMERCIAL

## 2022-12-15 VITALS
HEIGHT: 64 IN | DIASTOLIC BLOOD PRESSURE: 77 MMHG | SYSTOLIC BLOOD PRESSURE: 146 MMHG | HEART RATE: 88 BPM | WEIGHT: 173 LBS | BODY MASS INDEX: 29.53 KG/M2

## 2022-12-15 DIAGNOSIS — F41.8 SITUATIONAL ANXIETY: ICD-10-CM

## 2022-12-15 DIAGNOSIS — C44.319 BASAL CELL CARCINOMA (BCC) OF RIGHT TEMPLE REGION: Primary | ICD-10-CM

## 2022-12-15 PROCEDURE — 99213 PR OFFICE/OUTPT VISIT, EST, LEVL III, 20-29 MIN: ICD-10-PCS | Mod: S$GLB,,, | Performed by: DERMATOLOGY

## 2022-12-15 PROCEDURE — 3288F PR FALLS RISK ASSESSMENT DOCUMENTED: ICD-10-PCS | Mod: CPTII,S$GLB,, | Performed by: DERMATOLOGY

## 2022-12-15 PROCEDURE — 1159F PR MEDICATION LIST DOCUMENTED IN MEDICAL RECORD: ICD-10-PCS | Mod: CPTII,S$GLB,, | Performed by: DERMATOLOGY

## 2022-12-15 PROCEDURE — 3077F SYST BP >= 140 MM HG: CPT | Mod: CPTII,S$GLB,, | Performed by: DERMATOLOGY

## 2022-12-15 PROCEDURE — 99999 PR PBB SHADOW E&M-EST. PATIENT-LVL III: ICD-10-PCS | Mod: PBBFAC,,, | Performed by: DERMATOLOGY

## 2022-12-15 PROCEDURE — 1160F PR REVIEW ALL MEDS BY PRESCRIBER/CLIN PHARMACIST DOCUMENTED: ICD-10-PCS | Mod: CPTII,S$GLB,, | Performed by: DERMATOLOGY

## 2022-12-15 PROCEDURE — 1101F PT FALLS ASSESS-DOCD LE1/YR: CPT | Mod: CPTII,S$GLB,, | Performed by: DERMATOLOGY

## 2022-12-15 PROCEDURE — 1160F RVW MEDS BY RX/DR IN RCRD: CPT | Mod: CPTII,S$GLB,, | Performed by: DERMATOLOGY

## 2022-12-15 PROCEDURE — 1159F MED LIST DOCD IN RCRD: CPT | Mod: CPTII,S$GLB,, | Performed by: DERMATOLOGY

## 2022-12-15 PROCEDURE — 1101F PR PT FALLS ASSESS DOC 0-1 FALLS W/OUT INJ PAST YR: ICD-10-PCS | Mod: CPTII,S$GLB,, | Performed by: DERMATOLOGY

## 2022-12-15 PROCEDURE — 3078F PR MOST RECENT DIASTOLIC BLOOD PRESSURE < 80 MM HG: ICD-10-PCS | Mod: CPTII,S$GLB,, | Performed by: DERMATOLOGY

## 2022-12-15 PROCEDURE — 3078F DIAST BP <80 MM HG: CPT | Mod: CPTII,S$GLB,, | Performed by: DERMATOLOGY

## 2022-12-15 PROCEDURE — 99999 PR PBB SHADOW E&M-EST. PATIENT-LVL III: CPT | Mod: PBBFAC,,, | Performed by: DERMATOLOGY

## 2022-12-15 PROCEDURE — 3288F FALL RISK ASSESSMENT DOCD: CPT | Mod: CPTII,S$GLB,, | Performed by: DERMATOLOGY

## 2022-12-15 PROCEDURE — 1126F PR PAIN SEVERITY QUANTIFIED, NO PAIN PRESENT: ICD-10-PCS | Mod: CPTII,S$GLB,, | Performed by: DERMATOLOGY

## 2022-12-15 PROCEDURE — 3008F BODY MASS INDEX DOCD: CPT | Mod: CPTII,S$GLB,, | Performed by: DERMATOLOGY

## 2022-12-15 PROCEDURE — 3008F PR BODY MASS INDEX (BMI) DOCUMENTED: ICD-10-PCS | Mod: CPTII,S$GLB,, | Performed by: DERMATOLOGY

## 2022-12-15 PROCEDURE — 3077F PR MOST RECENT SYSTOLIC BLOOD PRESSURE >= 140 MM HG: ICD-10-PCS | Mod: CPTII,S$GLB,, | Performed by: DERMATOLOGY

## 2022-12-15 PROCEDURE — 99213 OFFICE O/P EST LOW 20 MIN: CPT | Mod: S$GLB,,, | Performed by: DERMATOLOGY

## 2022-12-15 PROCEDURE — 1126F AMNT PAIN NOTED NONE PRSNT: CPT | Mod: CPTII,S$GLB,, | Performed by: DERMATOLOGY

## 2022-12-15 RX ORDER — ALPRAZOLAM 0.25 MG/1
TABLET ORAL
Qty: 20 TABLET | Refills: 0 | Status: SHIPPED | OUTPATIENT
Start: 2022-12-15 | End: 2023-12-26

## 2022-12-15 NOTE — TELEPHONE ENCOUNTER
----- Message from Berenice Swann sent at 12/15/2022  8:10 AM CST -----  Who Called: Patient    What is the reqeust in detail: Requesting call back to discuss questions she has about some medications she has been prescribed when she travels. Patient also has questions about the covid vaccine and the shingles vaccine. Please advise.     Can the clinic reply by [facility]? No    Best Call Back Number: [phone]    Additional Information:

## 2022-12-15 NOTE — PROGRESS NOTES
ALLERGIES:  No known drug allergies    CHIEF COMPLAINT:  This 67 y.o. female comes for evaluation for Mohs' Micrographic Surgery, Fresh Tissue Technique, for treatment of a biopsy-proven basal cell carcinoma on the right temple. Consultation requested by Jeimy Hardy M.D..    HISTORY OF PRESENT ILLNESS:   Location: right temple  Duration: unknown  Context: status post biopsy by eJimy Hardy M.D.; path = basal cell carcinoma; pathology accession #[id number], [facility]     Prior Treatment: none    Defibrillator: No  Pacemaker: No  Artificial heart valves: No  Artificial joints: No          REVIEW OF SYSTEMS:   General: general health good  Skin: previous skin cancer(s) Yes   If yes, details: BCC scalp and lip had mohs  Relevant other:  No   Cardiovascular:   High Blood Pressure: No   Chest Pain:  No   Defibrillator: as above  Pacemaker: as above  Artificial heart valves: as above  Prior Endocarditis: No   Prior Heart Attack/MI: No     If yes, when:   Prior Cardiac Bypass or Stents:  No   If yes, when:   Mitral Valve Prolapse: No   Relevant other:  No   Respiratory:   Shortness of breath:  No   Relevant other:  No   Endocrine:   Diabetes:  No   Relevant other:  No   Hem/Lymph:   Taking Prescribed Blood Thinners:  No   Easy Bleeding:  No   Relevant other:  No   Allergy/Immuno: as noted above  Relevant other:  No   GI:   Prior Hepatitis:  No     If yes, details:   Relevant other:  No   Musculoskeletal:   Artificial joints: as above  Relevant other:  No   Neurologic:   Prior Stroke:  No     If yes, details:   Relevant other:  No   Relevant other info:  No      PAST MEDICAL HISTORY:  Past Medical History:   Diagnosis Date    Anxiety     Basal cell carcinoma     scalp and lip    Cancer     skin ca on face    Depression     GERD (gastroesophageal reflux disease)     Hyperlipidemia     Wears glasses        PAST SURGICAL HISTORY:    Past Surgical History:   Procedure Laterality Date    CHOLECYSTECTOMY      COLONOSCOPY   12    Dr. Jaime, 5 year recheck    COLONOSCOPY N/A 2021    Procedure: COLONOSCOPY;  Surgeon: Yasmeen Goldman MD;  Location: [facility] ENDO;  Service: Endoscopy;  Laterality: N/A;    ESOPHAGOGASTRODUODENOSCOPY  2012    Dr. Jaime; gastritis; bx unremarkable    ESOPHAGOGASTRODUODENOSCOPY N/A 2021    Procedure: EGD (ESOPHAGOGASTRODUODENOSCOPY);  Surgeon: Yasmeen Goldman MD;  Location: [facility] ENDO;  Service: Endoscopy;  Laterality: N/A;    FRACTURE SURGERY  2013    left arm Dr Cooper    laporoscopy          SOCIAL HISTORY:  Dependencies: smoking status as noted below  Social History     Tobacco Use    Smoking status: Former     Packs/day: 1.50     Years: 21.00     Pack years: 31.50     Types: Cigarettes     Quit date: 1984     Years since quittin.6    Smokeless tobacco: Never   Substance Use Topics    Alcohol use: Yes     Comment: occ    Drug use: No       PERTINENT MEDICATIONS:  See medications list.    Current Outpatient Medications:     ALPRAZolam (XANAX) 0.25 MG tablet, Take one up to three times daily 30 minutes before takeoff for anxiety with flying, Disp: 20 tablet, Rfl: 0    clobetasoL (TEMOVATE) 0.05 % cream, Apply twice a day to rash on palms for 3 wks or less only, Disp: 60 g, Rfl: 5    DULoxetine (CYMBALTA) 30 MG capsule, TAKE THREE CAPSULES (90MG) BY MOUTH ONCE DAILY, Disp: 90 capsule, Rfl: 2    lansoprazole (PREVACID) 30 MG capsule, Take 1 capsule (30 mg total) by mouth once daily., Disp: 30 capsule, Rfl: 11    ALLERGIES:  No known drug allergies    =EXAM:  Constitutional  General appearance: well-developed, well-nourished, well-kempt older white female    Neurologic/Psychiatric  Alert,  normal orientation to time, place, person  Normal mood and affect with no evidence of depression, anxiety, agitation  Skin: see photo(s)  Head: background moderate solar damage to exposed areas of skin  inspection/palpation reveals an approximately 2 cm pink plaque on the right temple    she confirmed this as the site of the prior biopsy and site(s) confirmed by reference to the photograph(s) attached below taken at the time of the biopsy/biopsies by the referring physician    Photo(s) this visit:       Photo(s) from biopsy visit:      ASSESSMENT: biopsy-proven basal cell carcinoma of the right temple  chronic solar damage to areas as noted above    PLAN:  The diagnosis and management options, and risks and benefits of the alternatives, including observation/non-treatment, radiation treatment, excision with vertical frozen section or paraffin-embedded section margin evaluation, and Mohs' Micrographic Surgery, Fresh Tissue Technique, were discussed at length with the patient. In particular, the discussion included, but was not limited to, the following:    One alternative at this point would be to defer further treatment and observe the lesion. With small skin cancers of this kind, it is possible that a biopsy can be sufficient to definitively treat a small skin cancer of this kind. Alternatively, some skin cancers are slow growing and do not require immediate treatment. The potential advantage of this choice would be to avoid the need for possibly unnecessary additional surgery. Among the potential disadvantages of this would be the possibility of enlargement of the lesion, more extensive spread of the lesion or recurrence at a later date, which might necessitate a larger and more complex surgery.    Radiation treatment can be an effective treatment for this type of skin cancer. The usual course of treatment is every weekday for several weeks. Local irritation will result from treatment, although no systemic side effects are expected. The potential advantage of radiation treatment is that it avoids the need for surgery. Among the disadvantages of radiation treatment are the length of treatment, the local inflammatory response, the absence of pathologic confirmation of the removal of the skin  cancer, a possible increased risk of additional skin cancer in the treated area in later years, and a somewhat increased risk of recurrence at a later date.     Excisional surgery can be an effective treatment for this type of skin cancer. This would involve excision of the lesion with margin evaluation by submitting the specimen to a pathologist for either immediate marginal assessment via frozen section processing, or delayed marginal assessment by fixed-tissue processing. The potential advantage of this technique is that it offers a way of treating the lesion with some degree of histologic confirmation of tumor removal. Among the disadvantages of this treatment are the possible need for re-excision if marginal involvement is identified, a somewhat greater likelihood of recurrence as compared to Mohs' surgery because of the less comprehensive margin evaluation inherent in the technique, and the general potential risks of surgery, including allergic reactions to the anesthetic and other materials used, infection, injury to nerves in the area with consequent loss of sensation or muscle function, and scarring or distortion of surrounding structures.    Mohs' surgery is a very effective treatment for this type of skin cancer. The potential advantage of Mohs' surgery is that this technique offers the greatest possible certainty of knowing that the skin cancer has been completely removed, with the removal of the least amount of normal tissue. The potential disadvantages of Mohs' surgery include the duration of the surgery, the possible need for a separate surgery for reconstruction following tumor removal, and scarring as a result. In addition, general potential risks of surgery as noted above also apply to treatment via Mohs' surgery.    In light of the nature of this tumor and the location in an area of increased risk of recurrence,  Mohs' micrographic surgery was thought to be the most appropriate management choice,  and this diagnosis is appropriate for treatment by Mohs' micrographic surgery.     We also discussed options for management of the wound following completion of tumor removal via the Mohs' technique. This discussion include a review of the nature of, and risks and benefits of the alternatives, including healing via secondary intention, primary linear closure, closure via adjacent tissue transfer/skin flap(s), and closure by use of a skin graft.     Sufficient time was available for questions, and all questions were answered to her satisfaction. She fully understands the aims, risks, alternatives, and possible complications, and has elected to proceed with the surgery, and verbally consented to do so. The procedure will be scheduled in the near future.    Routine pre-op instructions were given to her.    A consultation report will be sent to Dr. Hardy.    --------------------------------------  Note: Some or all of this note may have been generated using voice recognition software. There may be voice recognition errors including grammatical and/or spelling errors found in the text. Attempts were made to correct these errors prior to signature.

## 2022-12-15 NOTE — TELEPHONE ENCOUNTER
No new care gaps identified.  [facility] Embedded Care Gaps. Reference number: [id number]. 12/15/2022   9:45:37 AM CST

## 2022-12-15 NOTE — TELEPHONE ENCOUNTER
Patient flying to [place] on 12/19/22. Asking for refill on [employer]. Patient given information on covid and shingles vaccines. She will wait until after her trip to schedule those.

## 2023-01-11 ENCOUNTER — OFFICE VISIT (OUTPATIENT)
Dept: GASTROENTEROLOGY | Facility: CLINIC | Age: 68
End: 2023-01-11
Payer: COMMERCIAL

## 2023-01-11 VITALS
HEIGHT: 64 IN | BODY MASS INDEX: 30.07 KG/M2 | HEART RATE: 87 BPM | WEIGHT: 176.13 LBS | SYSTOLIC BLOOD PRESSURE: 157 MMHG | DIASTOLIC BLOOD PRESSURE: 86 MMHG

## 2023-01-11 DIAGNOSIS — Z79.899 LONG-TERM CURRENT USE OF PROTON PUMP INHIBITOR THERAPY: ICD-10-CM

## 2023-01-11 DIAGNOSIS — Z86.010 HISTORY OF COLON POLYPS: ICD-10-CM

## 2023-01-11 DIAGNOSIS — G89.29 CHRONIC UPPER ABDOMINAL PAIN: ICD-10-CM

## 2023-01-11 DIAGNOSIS — Z90.49 S/P CHOLECYSTECTOMY: ICD-10-CM

## 2023-01-11 DIAGNOSIS — R10.10 CHRONIC UPPER ABDOMINAL PAIN: ICD-10-CM

## 2023-01-11 DIAGNOSIS — K59.00 CONSTIPATION, UNSPECIFIED CONSTIPATION TYPE: ICD-10-CM

## 2023-01-11 DIAGNOSIS — Z87.898 HISTORY OF NAUSEA: ICD-10-CM

## 2023-01-11 DIAGNOSIS — R12 HEARTBURN: ICD-10-CM

## 2023-01-11 DIAGNOSIS — K21.9 GASTROESOPHAGEAL REFLUX DISEASE WITHOUT ESOPHAGITIS: Primary | ICD-10-CM

## 2023-01-11 DIAGNOSIS — R14.2 BELCHING: ICD-10-CM

## 2023-01-11 DIAGNOSIS — R14.0 ABDOMINAL BLOATING: ICD-10-CM

## 2023-01-11 PROCEDURE — 1101F PT FALLS ASSESS-DOCD LE1/YR: CPT | Mod: CPTII,S$GLB,,

## 2023-01-11 PROCEDURE — 1159F PR MEDICATION LIST DOCUMENTED IN MEDICAL RECORD: ICD-10-PCS | Mod: CPTII,S$GLB,,

## 2023-01-11 PROCEDURE — 1126F PR PAIN SEVERITY QUANTIFIED, NO PAIN PRESENT: ICD-10-PCS | Mod: CPTII,S$GLB,,

## 2023-01-11 PROCEDURE — 3079F DIAST BP 80-89 MM HG: CPT | Mod: CPTII,S$GLB,,

## 2023-01-11 PROCEDURE — 3077F PR MOST RECENT SYSTOLIC BLOOD PRESSURE >= 140 MM HG: ICD-10-PCS | Mod: CPTII,S$GLB,,

## 2023-01-11 PROCEDURE — 3077F SYST BP >= 140 MM HG: CPT | Mod: CPTII,S$GLB,,

## 2023-01-11 PROCEDURE — 1160F RVW MEDS BY RX/DR IN RCRD: CPT | Mod: CPTII,S$GLB,,

## 2023-01-11 PROCEDURE — 99213 OFFICE O/P EST LOW 20 MIN: CPT | Mod: S$GLB,,,

## 2023-01-11 PROCEDURE — 3008F BODY MASS INDEX DOCD: CPT | Mod: CPTII,S$GLB,,

## 2023-01-11 PROCEDURE — 3079F PR MOST RECENT DIASTOLIC BLOOD PRESSURE 80-89 MM HG: ICD-10-PCS | Mod: CPTII,S$GLB,,

## 2023-01-11 PROCEDURE — 1101F PR PT FALLS ASSESS DOC 0-1 FALLS W/OUT INJ PAST YR: ICD-10-PCS | Mod: CPTII,S$GLB,,

## 2023-01-11 PROCEDURE — 1159F MED LIST DOCD IN RCRD: CPT | Mod: CPTII,S$GLB,,

## 2023-01-11 PROCEDURE — 1126F AMNT PAIN NOTED NONE PRSNT: CPT | Mod: CPTII,S$GLB,,

## 2023-01-11 PROCEDURE — 1160F PR REVIEW ALL MEDS BY PRESCRIBER/CLIN PHARMACIST DOCUMENTED: ICD-10-PCS | Mod: CPTII,S$GLB,,

## 2023-01-11 PROCEDURE — 3288F FALL RISK ASSESSMENT DOCD: CPT | Mod: CPTII,S$GLB,,

## 2023-01-11 PROCEDURE — 99999 PR PBB SHADOW E&M-EST. PATIENT-LVL IV: ICD-10-PCS | Mod: PBBFAC,,,

## 2023-01-11 PROCEDURE — 99213 PR OFFICE/OUTPT VISIT, EST, LEVL III, 20-29 MIN: ICD-10-PCS | Mod: S$GLB,,,

## 2023-01-11 PROCEDURE — 3008F PR BODY MASS INDEX (BMI) DOCUMENTED: ICD-10-PCS | Mod: CPTII,S$GLB,,

## 2023-01-11 PROCEDURE — 3288F PR FALLS RISK ASSESSMENT DOCUMENTED: ICD-10-PCS | Mod: CPTII,S$GLB,,

## 2023-01-11 PROCEDURE — 99999 PR PBB SHADOW E&M-EST. PATIENT-LVL IV: CPT | Mod: PBBFAC,,,

## 2023-01-11 RX ORDER — OMEPRAZOLE 40 MG/1
40 CAPSULE, DELAYED RELEASE ORAL EVERY MORNING
Qty: 30 CAPSULE | Refills: 2 | Status: SHIPPED | OUTPATIENT
Start: 2023-01-11 | End: 2023-06-13 | Stop reason: SDUPTHER

## 2023-01-11 NOTE — PROGRESS NOTES
Subjective:       Patient ID: Gaby Donohue is a 67 y.o. female Body mass index is 30.24 kg/m².    Chief Complaint: Gastroesophageal Reflux    This patient is new to me.  Established patient of Dr. Goldman and Vero Bell.     Gastroesophageal Reflux  She complains of abdominal pain (chronic; denies currently; epigastric and LUQ pain intermittently that improves heating pad), belching, heartburn and nausea (comes in waves - occurs in the morning and improves over time; reports nausea is mild and does not require medication management). She reports no chest pain, no choking, no coughing, no dysphagia, no early satiety, no globus sensation, no hoarse voice, no sore throat or no water brash. Also reports constipation & abdominal bloating; rated stool 1 on [place] scale; currently having BMs every day to every other day; history of piecemeal polypectomy on previous colonoscopy recommended repeat, but patient has not scheduled. This is a chronic problem. The current episode started more than 1 year ago. The problem occurs rarely. The problem has been gradually improving (improved the last couple of weeks). The heartburn duration is an hour (Improved since starting Prilosec 40 mg once a day; varies in duration). The heartburn is located in the substernum. The heartburn is of mild intensity. The heartburn does not wake her from sleep. The heartburn does not limit her activity. The heartburn changes with position. The symptoms are aggravated by certain foods and lying down (Worsens after eating red sauce, acidic, greasy foods, or chocolate). Pertinent negatives include no anemia, fatigue, melena, muscle weakness or weight loss. Colonoscopy 06/09/21 - Preparation of the colon was fair. One 13 mm polyp in the ascending colon, removed with a hot snare, removed using injection-lift and a hot snare and removed piecemeal using a hot snare. Resected and retrieved. Tattooed. Stool in the entire examined colon. External and  "internal hemorrhoids: patho: tubular adenoma. Risk factors include caffeine use and obesity (Drinks 1-2 coffees a day and daily cokes -she is trying to decrease caffeine intake). She has tried a PPI, a diet change and head elevation (Currently taking prilosec 40 mg daily (reports she was taking her 's Prilosec); past treatments: Prevacid 30 mg once daily & Protonix - ineffective; avoiding acidic food) for the symptoms. The treatment provided moderate relief. Past procedures include an abdominal ultrasound (02/03/12 -  "Right renal cysts including a calcified cyst which is reportedly stable relative to 2004. Prior cholecystectomy. Large exophytic right upper pole renal cyst which is simple") and an EGD (06/09/21 - Normal esophagus. Erythematous mucosa in the antrum. Biopsied. Multiple gastric polyps. Normal examined duodenum; patho: Fundic gland polyp, benign, no bacteria). Past procedures do not include esophageal manometry, esophageal pH monitoring, H. pylori antibody titer or a UGI. S/P cholecystectomy. Past invasive treatments do not include gastroplasty, gastroplication or reflux surgery.     Review of Systems   Constitutional:  Negative for activity change, appetite change, chills, diaphoresis, fatigue, fever, unexpected weight change and weight loss.   HENT:  Negative for hoarse voice, sore throat and trouble swallowing.    Respiratory:  Negative for cough, choking and shortness of breath.    Cardiovascular:  Negative for chest pain.   Gastrointestinal:  Positive for abdominal pain (chronic; denies currently; epigastric and LUQ pain intermittently that improves heating pad), constipation, heartburn and nausea (comes in waves - occurs in the morning and improves over time; reports nausea is mild and does not require medication management). Negative for abdominal distention, anal bleeding, blood in stool, diarrhea, dysphagia, melena, rectal pain and vomiting.   Musculoskeletal:  Negative for muscle " weakness.       No LMP recorded. Patient is postmenopausal.  Past Medical History:   Diagnosis Date    Anxiety     Basal cell carcinoma     scalp and lip    Cancer     skin ca on face    Depression     GERD (gastroesophageal reflux disease)     Hyperlipidemia     Wears glasses      Past Surgical History:   Procedure Laterality Date    CHOLECYSTECTOMY      COLONOSCOPY  2012    Dr. Jaime, 5 year recheck    COLONOSCOPY N/A 2021    Procedure: COLONOSCOPY;  Surgeon: Yasmeen Goldman MD;  Location: [facility] ENDO;  Service: Endoscopy;  Laterality: N/A;    ESOPHAGOGASTRODUODENOSCOPY  2012    Dr. Jaime; gastritis; bx unremarkable    ESOPHAGOGASTRODUODENOSCOPY N/A 2021    Procedure: EGD (ESOPHAGOGASTRODUODENOSCOPY);  Surgeon: Yasmeen Goldman MD;  Location: [facility] ENDO;  Service: Endoscopy;  Laterality: N/A;    FRACTURE SURGERY  2013    left arm Dr Cooper    laporoscopy      UPPER GASTROINTESTINAL ENDOSCOPY       Family History   Problem Relation Age of Onset    COPD Mother     Emphysema Mother     Heart disease Father     Cancer Sister         skin cancer face    Cancer Sister         skin cancer face    Cancer Sister         skin cancer face     Cancer Brother         skin cancer face     No Known Problems Brother     No Known Problems Daughter     No Known Problems Daughter     Colon cancer Neg Hx     Colon polyps Neg Hx     Esophageal cancer Neg Hx     Stomach cancer Neg Hx      Social History     Tobacco Use    Smoking status: Former     Packs/day: 1.50     Years: 21.00     Pack years: 31.50     Types: Cigarettes     Quit date: 1984     Years since quittin.7    Smokeless tobacco: Never   Substance Use Topics    Alcohol use: Yes     Comment: occ    Drug use: No     Wt Readings from Last 10 Encounters:   23 79.9 kg (176 lb 2.4 oz)   12/15/22 78.5 kg (173 lb)   22 78.5 kg (173 lb 2.7 oz)   21 79.4 kg (175 lb 0.7 oz)   21 78.9 kg (174 lb)   21 79.7 kg  (175 lb 11.3 oz)   06/09/21 79.4 kg (175 lb)   12/10/20 79 kg (174 lb 2.6 oz)   06/15/20 74.1 kg (163 lb 5.8 oz)   03/02/20 69.8 kg (153 lb 14.1 oz)     Lab Results   Component Value Date    WBC 5.26 06/19/2020    HGB 13.6 06/19/2020    HCT 42.7 06/19/2020    MCV 96 06/19/2020     06/19/2020     CMP  Sodium   Date Value Ref Range Status   06/19/2020 138 136 - 145 mmol/L Final     Potassium   Date Value Ref Range Status   06/19/2020 4.9 3.5 - 5.1 mmol/L Final     Chloride   Date Value Ref Range Status   06/19/2020 102 95 - 110 mmol/L Final     CO2   Date Value Ref Range Status   06/19/2020 30 (H) 23 - 29 mmol/L Final     Glucose   Date Value Ref Range Status   06/19/2020 106 70 - 110 mg/dL Final     BUN   Date Value Ref Range Status   06/19/2020 16 8 - 23 mg/dL Final     Creatinine   Date Value Ref Range Status   06/19/2020 0.7 0.5 - 1.4 mg/dL Final     Calcium   Date Value Ref Range Status   06/19/2020 9.2 8.7 - 10.5 mg/dL Final     Total Protein   Date Value Ref Range Status   06/19/2020 7.1 6.0 - 8.4 g/dL Final     Albumin   Date Value Ref Range Status   06/19/2020 3.7 3.5 - 5.2 g/dL Final     Total Bilirubin   Date Value Ref Range Status   06/19/2020 0.4 0.1 - 1.0 mg/dL Final     Comment:     For infants and newborns, interpretation of results should be based  on gestational age, weight and in agreement with clinical  observations.  Premature Infant recommended reference ranges:  Up to 24 hours.............<8.0 mg/dL  Up to 48 hours............<12.0 mg/dL  3-5 days..................<15.0 mg/dL  6-29 days.................<15.0 mg/dL       Alkaline Phosphatase   Date Value Ref Range Status   06/19/2020 79 55 - 135 U/L Final     AST   Date Value Ref Range Status   06/19/2020 24 10 - 40 U/L Final     ALT   Date Value Ref Range Status   06/19/2020 17 10 - 44 U/L Final     Anion Gap   Date Value Ref Range Status   06/19/2020 6 (L) 8 - 16 mmol/L Final     eGFR if    Date Value Ref Range Status    06/19/2020 >60.0 >60 mL/min/1.73 m^2 Final     eGFR if non    Date Value Ref Range Status   06/19/2020 >60.0 >60 mL/min/1.73 m^2 Final     Comment:     Calculation used to obtain the estimated glomerular filtration  rate (eGFR) is the CKD-EPI equation.        Lab Results   Component Value Date    TSH 1.6 06/03/2004     Reviewed prior medical records including radiology report of abdominal pelvis CT 02/23/2012, abdominal ultrasound 02/03/2012 & endoscopy history (see surgical history).    Objective:      Physical Exam  Vitals and nursing note reviewed.   Constitutional:       General: She is not in acute distress.     Appearance: Normal appearance. She is obese. She is not ill-appearing.   HENT:      Mouth/Throat:      Lips: Pink. No lesions.   Cardiovascular:      Rate and Rhythm: Normal rate and regular rhythm.      Pulses: Normal pulses.   Pulmonary:      Effort: Pulmonary effort is normal. No respiratory distress.      Breath sounds: Normal breath sounds.   Abdominal:      General: Abdomen is protuberant. Bowel sounds are normal. There is no distension or abdominal bruit. There are no signs of injury.      Palpations: Abdomen is soft. There is no shifting dullness, fluid wave, hepatomegaly, splenomegaly or mass.      Tenderness: There is no abdominal tenderness. There is no guarding or rebound. Negative signs include Jane's sign, Rovsing's sign and McBurney's sign.      Hernia: No hernia is present.   Skin:     General: Skin is warm and dry.      Coloration: Skin is not jaundiced or pale.   Neurological:      Mental Status: She is alert and oriented to person, place, and time.   Psychiatric:         Mood and Affect: Mood normal.         Behavior: Behavior normal.       Assessment:       1. Gastroesophageal reflux disease without esophagitis    2. Heartburn    3. Belching    4. Chronic upper abdominal pain    5. History of nausea    6. Long-term current use of proton pump inhibitor therapy     7. Constipation, unspecified constipation type    8. Abdominal bloating    9. History of colon polyps    10. S/P cholecystectomy        Plan:       Gastroesophageal reflux disease without esophagitis, Heartburn, & Belching  -Educated patient on lifestyle modifications to help control/reduce reflux/abdominal pain including: avoid large meals, avoid eating within 2-3 hours of bedtime (avoid late night eating & lying down soon after eating), elevate head of bed if nocturnal symptoms are present, smoking cessation (if current smoker), & weight loss (if overweight).   -Educated to avoid known foods which trigger reflux symptoms & to minimize/avoid high-fat foods, chocolate, caffeine, citrus, alcohol, & tomato products.  -Advised to avoid/limit use of NSAID's, since they can cause GI upset, bleeding, and/or ulcers. If needed, take with food.   -stop Prevacid and Protonix  -START: omeprazole (PRILOSEC) 40 MG capsule; Take 1 capsule (40 mg total) by mouth every morning.  Dispense: 30 capsule; Refill: 2    Chronic upper abdominal pain  -in applying heat such as heating pad to area    History of nausea  -consider antiemetics if nausea worsens  -avoid known triggers  -consider GES    Long-term current use of proton pump inhibitor therapy  - recommend annual monitoring with blood work to include CMP, CBC, vitamin B12, and magnesium.  -     Magnesium; Future; Expected date: 01/11/2023  -     Vitamin B12; Future; Expected date: 01/11/2023    Constipation, unspecified constipation type  - schedule Colonoscopy, discussed procedure with the patient, including risks and benefits, patient verbalized understanding  -Recommend daily exercise as tolerated, adequate water intake (six 8-oz glasses of water daily), and high fiber diet. OTC fiber supplements are recommended if diet does not reach daily fiber goal (20-30 grams daily), such as Metamucil, Citrucel, or FiberCon (take as directed, separate from other oral medications by >2  hours).  -Recommend taking an OTC stool softener such as Colace as directed to avoid hard stools and straining with bowel movements PRN  -Recommend trying OTC MiraLax once daily (17g PO) as directed  - If no improvement with above recommendations, try intermittently dosed Dulcolax OTC as directed (every 3-4  days) PRN to facilitate bowel movements  -If still no improvement with these measures, call/follow-up  -     Case Request Endoscopy: COLONOSCOPY    Abdominal bloating  - schedule Colonoscopy, discussed procedure with the patient, including risks and benefits, patient verbalized understanding  - recommend OTC simethicone as directed, such as Phazyme or Gas-x  - recommend low gas diet: Reduce or eliminate these foods from your diet: Broccoli, Cauliflower, [place] sprouts, Cabbage, Cooked dried beans, Carbonated beverages (sparkling water, soda, beer, champagne)  Other Causes Of Excess Gas Include:   1) EATING TOO FAST or TALKING WHILE YOU CHEW may cause you to swallow air. This increases the amount of gas in the stomach and may worsen your symptoms.  --> Chew each mouthful completely before swallowing. Take your time.  2) OVEREATING may increase the feeling of being bloated and cause more gas.  --> When you are full, stop eating.  3) CONSTIPATION can increase the amount of normal intestinal gas.  --> Avoid constipation by increasing the amount of fiber in your diet by including whole cereal grains, fresh vegetables (except those in the above list) and fresh fruits. High-fiber foods absorb water and carry it out of the body. When increasing the amount of fiber in your diet, you also need to increase the amount of water that you drink. You should drink at least eight 8-ounce glasses of water (two quarts) per day.    History of colon polyps  -history of piecemeal polypectomy on previous colonoscopy done in 2021  - schedule Colonoscopy, discussed procedure with the patient, including risks and benefits, patient  verbalized understanding  -     Case Request Endoscopy: COLONOSCOPY    S/P cholecystectomy    Follow up in about 4 weeks (around 2/8/2023), or if symptoms worsen or fail to improve.      If no improvement in symptoms or symptoms worsen, call/follow-up at clinic or go to ER.        30 minutes of total time spent on the encounter, which includes face to face time and non-face to face time preparing to see the patient (eg, review of tests), Obtaining and/or reviewing separately obtained history, Documenting clinical information in the electronic or other health record, Independently interpreting results (not separately reported) and communicating results to the patient/family/caregiver, or Care coordination (not separately reported).     A dictation software program was used for this note. Please expect some simple typographical  errors in this note.

## 2023-01-23 ENCOUNTER — IMMUNIZATION (OUTPATIENT)
Dept: PRIMARY CARE CLINIC | Facility: CLINIC | Age: 68
End: 2023-01-23
Payer: COMMERCIAL

## 2023-01-23 DIAGNOSIS — Z23 NEED FOR VACCINATION: Primary | ICD-10-CM

## 2023-01-23 PROCEDURE — 91312 COVID-19, MRNA, LNP-S, BIVALENT BOOSTER, PF, 30 MCG/0.3 ML DOSE: CPT | Mod: S$GLB,,, | Performed by: FAMILY MEDICINE

## 2023-01-23 PROCEDURE — 91312 COVID-19, MRNA, LNP-S, BIVALENT BOOSTER, PF, 30 MCG/0.3 ML DOSE: ICD-10-PCS | Mod: S$GLB,,, | Performed by: FAMILY MEDICINE

## 2023-01-23 PROCEDURE — 0124A COVID-19, MRNA, LNP-S, BIVALENT BOOSTER, PF, 30 MCG/0.3 ML DOSE: CPT | Mod: S$GLB,,, | Performed by: FAMILY MEDICINE

## 2023-01-23 PROCEDURE — 0124A COVID-19, MRNA, LNP-S, BIVALENT BOOSTER, PF, 30 MCG/0.3 ML DOSE: ICD-10-PCS | Mod: S$GLB,,, | Performed by: FAMILY MEDICINE

## 2023-02-23 DIAGNOSIS — F32.5 MAJOR DEPRESSIVE DISORDER WITH SINGLE EPISODE, IN FULL REMISSION: ICD-10-CM

## 2023-02-23 RX ORDER — DULOXETIN HYDROCHLORIDE 30 MG/1
CAPSULE, DELAYED RELEASE ORAL
Qty: 90 CAPSULE | Refills: 2 | Status: SHIPPED | OUTPATIENT
Start: 2023-02-23 | End: 2023-06-30 | Stop reason: SDUPTHER

## 2023-02-23 NOTE — TELEPHONE ENCOUNTER
No new care gaps identified.  [facility] Embedded Care Gaps. Reference number: [id number]. 2/23/2023   10:11:59 AM CST

## 2023-03-06 NOTE — PROGRESS NOTES
Prior photo(s) of site(s) to confirm location(s):      Defibrillator: No  Pacemaker: No  Artificial heart valves: No  Artificial joints: No    ALLERGIES:   No known drug allergies      Current Outpatient Medications:     ALPRAZolam (XANAX) 0.25 MG tablet, Take one up to three times daily 30 minutes before takeoff for anxiety with flying, Disp: 20 tablet, Rfl: 0    clobetasoL (TEMOVATE) 0.05 % cream, Apply twice a day to rash on palms for 3 wks or less only, Disp: 60 g, Rfl: 5    DULoxetine (CYMBALTA) 30 MG capsule, TAKE THREE CAPSULES (90MG) BY MOUTH ONCE DAILY, Disp: 90 capsule, Rfl: 2    omeprazole (PRILOSEC) 40 MG capsule, Take 1 capsule (40 mg total) by mouth every morning., Disp: 30 capsule, Rfl: 2  -------------------------------------------------------------  PROCEDURE: Mohs' Micrographic Surgery    SITE: right temple     INDICATION: basal cell carcinoma in an area at increased risk of recurrence    CASE NUMBER: [id number]      ANESTHETIC: 3 mL 2% Lidocaine with Epinephrine 1:100,000    SURGICAL PREP: Ethanol and ophthalmic Betadine    SURGEON: Ever Castro MD    ASSISTANTS: Luca Martin CST     PREOPERATIVE DIAGNOSIS: basal cell carcinoma    POSTOPERATIVE DIAGNOSIS: basal cell carcinoma    PATHOLOGIC DIAGNOSIS: basal cell carcinoma    STAGES OF MOHS' SURGERY PERFORMED: one    TUMOR-FREE PLANE ACHIEVED: yes    HEMOSTASIS: Hyfrecation     SPECIMENS: one (one in stage A)    INITIAL LESION SIZE: 1.3 x 1.4 cm    FINAL DEFECT SIZE: 1.3 x 1.3 cm    WOUND REPAIR/DISPOSITION: see below    NARRATIVE:    The patient is a 67 y.o.female referred by Jeimy Hardy MD with a history of cancer on the right temple which was biopsied - pathology accession #[id number], [facility]. Findings revealed basal cell carcinoma. Examination revealed a pink nodule on the right temple at the site of prior biopsy, which was confirmed by reference to the photograph taken at the previous patient visit, as attached above. In  light of the nature of this tumor and the location on the face, Mohs' micrographic surgery was thought to be the most appropriate management choice, and this diagnosis is appropriate for treatment by Mohs' micrographic surgery.  I discussed it with the patient and she fully understands the aims, risks, alternatives, and possible complications, and elects to proceed.  There are no medical or surgical contraindications to the procedure.     A signed informed consent was obtained.    PROCEDURE:  The patient was placed in the left lateral decubitus position on the operating table in the Mohs' Surgery Suite. Hair overlying and adjacent to the site was removed with an electric clipper. The area in question was thoroughly prepped with ethanol and ophthalmic Betadine. A sterile surgical marker was used to outline the clinically apparent margins of the involved area, and a narrow margin of normal-appearing skin. Reference marks were made at the periphery of the outlined area with the surgical marker. The proposed area of excision was measured and photographed. Local anesthesia as noted above was administered.  The total volume of anesthetic used throughout this portion of the procedure was as documented above. The area was prepared and draped in the standard manner. All of the grossly identifiable area of clinically abnormal tissue and an underlying/peripheral layer was taken and processed by the Mohs' technique.  Hemostasis was obtained with the hyfrecator. Tissue was taken from any areas of residual marginal involvement (if present) and processed by the Mohs' technique in as many stages as needed until a tumor-free plane was achieved.    Colors of inks used in the reference nicks at epidermal margins (if present) and/or inking of non-epithelial edges, if applicable, is represented on the Mohs map as follows: solid lines represent red ink, dots represent blue ink, jagged lines represent black ink, curlicues represent green  "ink, "xxx" represents yellow ink.    The first Mohs' layer consisted of one section(s) with 4 slide(s) evaluated. Histology of the specimen(s) showed, on the last cut, foci of basal cell carcinoma, manifested as nests of basaloid cells with hyperchromatic nuclei, peripheral palisading, and artifactual clefting around the nests/strands at the deep margin between the black and green nicks,  as noted on the Mohs' map; multiple earlier cuts were clear in this area and actual surgical margins were assessed as clear.  In addition, a tumor floater artifact, not in contact with the Mohs' specimen, was noted on several slides.    A total of one section(s) and 4 slide(s) were examined under the microscope via the Mohs technique.  A cancer free plane was reached after layer number one. Defect final size was as noted above.      The wound was covered with a nonadherent dressing between stages, and the patient allowed to wait in the waiting area during these periods. The final defect was photographed at the completion of the Mohs' procedure.    See the separate procedure note which follows regarding repair of the defect following Mohs' surgery.       -----------------------------------------------    REPAIR FOLLOWING MOHS' MICROGRAPHIC SURGERY    PREOPERATIVE DIAGNOSIS: defect following Mohs' surgery for a basal cell carcinoma    POSTOPERATIVE DIAGNOSIS: same    PROCEDURE PERFORMED: intermediate (layered) closure     ANESTHETIC: 3 mL 2% Lidocaine with Epinephrine 1:100,000    SURGICAL PREP: ophthalmic Betadine     SURGEON: Ever Castro MD     ASSISTANTS: as above    LOCATION: right temple      INDICATIONS:  Earlier in the day, the patient underwent Mohs' micrographic surgical excision of a basal cell carcinoma on the right temple . Tumor free margins were achieved after layer number one.  Later in the day, the management of the resulting wound was addressed with the patient. I discussed the various wound management options " with the patient and she fully understands the aims, risks, alternatives, and possible complications of the alternatives, and she elects to proceed with closure of the defect in the manner noted below.  There are no medical or surgical contraindications to the procedure.    A signed informed consent was previously obtained.    PROCEDURE:  Repair via intermediate closure:  The patient was returned to the procedure room following completion of the Mohs' procedure and final slide review. Because of the size, shape and location of the defect, simple closure could not be achieved without excessive tension on the wound margins and an unacceptable risk of wound dehiscence and standing cone deformities. After surgical prepping, additional anesthetic was infiltrated into the tissues surrounding the defect and the anticipated area of repair, to maintain anesthesia during the procedure. Preparation of the site was carried out by extending the defect through excision of small triangles of superfluous tissue on either side of the wound to square the shoulders of the defect and to allow closure without distortion by standing cone deformities, creating a semi-fusiform defect with an inferior M-plasty. Wound margins were minimally undermined to allow closure with minimal tension. After hemostasis was achieved with the hyfrecator, closure was accomplished in layered fashion with:      multiple #5-0 buried interrupted Vicryl suture(s) and    multiple #5-0 simple interrupted Prolene suture(s) and    one #5-0 running locked Prolene suture(s) for final approximation of the wound margins.    Total length of the final closure, including the limbs of the M-plasty, was 4.5 cm.      The site was photographed following completion of the repair. Final dressing consisted of petrolatum, Telfa and tape.    Estimated blood loss for the total procedure was less than 5 mL.    Total operative time including tissue processing in the Mohs' laboratory  and microscopic Mohs' frozen section slide review was 2 hour(s). Verbal and written wound care instructions were given to the patient, and she expressed understanding of these instructions. The patient tolerated the procedure well and left the operating room in good condition; she is to return in 7 days for suture removal.     Dr. Castro's cell phone number was given to the patient with instructions to call prn with any problems.

## 2023-03-08 ENCOUNTER — PROCEDURE VISIT (OUTPATIENT)
Dept: DERMATOLOGY | Facility: CLINIC | Age: 68
End: 2023-03-08
Payer: COMMERCIAL

## 2023-03-08 VITALS
BODY MASS INDEX: 30.05 KG/M2 | SYSTOLIC BLOOD PRESSURE: 144 MMHG | HEART RATE: 87 BPM | WEIGHT: 176 LBS | DIASTOLIC BLOOD PRESSURE: 88 MMHG | HEIGHT: 64 IN

## 2023-03-08 DIAGNOSIS — C44.319 BASAL CELL CARCINOMA (BCC) OF RIGHT TEMPLE REGION: Primary | ICD-10-CM

## 2023-03-08 PROCEDURE — 99499 UNLISTED E&M SERVICE: CPT | Mod: S$GLB,,, | Performed by: DERMATOLOGY

## 2023-03-08 PROCEDURE — 99499 NO LOS: ICD-10-PCS | Mod: S$GLB,,, | Performed by: DERMATOLOGY

## 2023-03-08 PROCEDURE — 12052 PR INTERMED WOUND REPAIR FACE/EAR/EYELID/NOSE/LIP/MUC MEBR, 2.6 TO 5.0CM: ICD-10-PCS | Mod: 51,S$GLB,, | Performed by: DERMATOLOGY

## 2023-03-08 PROCEDURE — 12052 INTMD RPR FACE/MM 2.6-5.0 CM: CPT | Mod: 51,S$GLB,, | Performed by: DERMATOLOGY

## 2023-03-08 PROCEDURE — 17311 MOHS 1 STAGE H/N/HF/G: CPT | Mod: S$GLB,,, | Performed by: DERMATOLOGY

## 2023-03-08 PROCEDURE — 17311: ICD-10-PCS | Mod: S$GLB,,, | Performed by: DERMATOLOGY

## 2023-03-10 ENCOUNTER — TELEPHONE (OUTPATIENT)
Dept: GASTROENTEROLOGY | Facility: CLINIC | Age: 68
End: 2023-03-10
Payer: COMMERCIAL

## 2023-03-10 NOTE — TELEPHONE ENCOUNTER
Call placed to Ms. Donohue in regards to message received. Confirmed she wanted to cancel her colonoscopy on 3/17. She stated yes, and she will call back to rescheduled. No further issues noted.

## 2023-03-10 NOTE — TELEPHONE ENCOUNTER
----- Message from Nav Hernandez sent at 3/10/2023  4:10 PM CST -----      Name of Who is Calling:PT          What is the request in detail:PT is calling to cancel her upcoming procedure, she states she will call back when she's ready to reschedule her appointment. Please be Advised!          Can the clinic reply by [facility]:no          What Number to Call Back if not in [phone]

## 2023-03-15 ENCOUNTER — OFFICE VISIT (OUTPATIENT)
Dept: DERMATOLOGY | Facility: CLINIC | Age: 68
End: 2023-03-15
Payer: COMMERCIAL

## 2023-03-15 DIAGNOSIS — Z48.02 VISIT FOR SUTURE REMOVAL: Primary | ICD-10-CM

## 2023-03-15 PROCEDURE — 1160F PR REVIEW ALL MEDS BY PRESCRIBER/CLIN PHARMACIST DOCUMENTED: ICD-10-PCS | Mod: CPTII,S$GLB,, | Performed by: DERMATOLOGY

## 2023-03-15 PROCEDURE — 1160F RVW MEDS BY RX/DR IN RCRD: CPT | Mod: CPTII,S$GLB,, | Performed by: DERMATOLOGY

## 2023-03-15 PROCEDURE — 1159F MED LIST DOCD IN RCRD: CPT | Mod: CPTII,S$GLB,, | Performed by: DERMATOLOGY

## 2023-03-15 PROCEDURE — 99024 POSTOP FOLLOW-UP VISIT: CPT | Mod: S$GLB,,, | Performed by: DERMATOLOGY

## 2023-03-15 PROCEDURE — 1101F PR PT FALLS ASSESS DOC 0-1 FALLS W/OUT INJ PAST YR: ICD-10-PCS | Mod: CPTII,S$GLB,, | Performed by: DERMATOLOGY

## 2023-03-15 PROCEDURE — 3288F PR FALLS RISK ASSESSMENT DOCUMENTED: ICD-10-PCS | Mod: CPTII,S$GLB,, | Performed by: DERMATOLOGY

## 2023-03-15 PROCEDURE — 3288F FALL RISK ASSESSMENT DOCD: CPT | Mod: CPTII,S$GLB,, | Performed by: DERMATOLOGY

## 2023-03-15 PROCEDURE — 1126F PR PAIN SEVERITY QUANTIFIED, NO PAIN PRESENT: ICD-10-PCS | Mod: CPTII,S$GLB,, | Performed by: DERMATOLOGY

## 2023-03-15 PROCEDURE — 1159F PR MEDICATION LIST DOCUMENTED IN MEDICAL RECORD: ICD-10-PCS | Mod: CPTII,S$GLB,, | Performed by: DERMATOLOGY

## 2023-03-15 PROCEDURE — 99999 PR PBB SHADOW E&M-EST. PATIENT-LVL II: CPT | Mod: PBBFAC,,, | Performed by: DERMATOLOGY

## 2023-03-15 PROCEDURE — 1101F PT FALLS ASSESS-DOCD LE1/YR: CPT | Mod: CPTII,S$GLB,, | Performed by: DERMATOLOGY

## 2023-03-15 PROCEDURE — 1126F AMNT PAIN NOTED NONE PRSNT: CPT | Mod: CPTII,S$GLB,, | Performed by: DERMATOLOGY

## 2023-03-15 PROCEDURE — 99024 PR POST-OP FOLLOW-UP VISIT: ICD-10-PCS | Mod: S$GLB,,, | Performed by: DERMATOLOGY

## 2023-03-15 PROCEDURE — 99999 PR PBB SHADOW E&M-EST. PATIENT-LVL II: ICD-10-PCS | Mod: PBBFAC,,, | Performed by: DERMATOLOGY

## 2023-03-15 NOTE — PROGRESS NOTES
CC: 67 y.o.female patient is here for suture removal.     HPI: Patient is one week(s) s/p Mohs' micrographic surgery, fresh tissue technique of a basal cell carcinoma on the right temple, with subsequent repair   Patient reports no problems.    EXAM:  Sutures intact.  Wound healing well.  Good approximation of skin edges.  No undue erythema to surrounding skin or signs or symptoms of infection.    IMPRESSION:  Healing well post Mohs' micrographic surgery and repair    PLAN:  Site cleaned with peroxide, sutures removed  Dressed with petrolatum, Telfa and tape  Reviewed further care and expected course  Followup to general derm in 3-4 months; PRN to me

## 2023-04-13 ENCOUNTER — OFFICE VISIT (OUTPATIENT)
Dept: DERMATOLOGY | Facility: CLINIC | Age: 68
End: 2023-04-13
Payer: COMMERCIAL

## 2023-04-13 VITALS — WEIGHT: 175.94 LBS | HEIGHT: 64 IN | BODY MASS INDEX: 30.04 KG/M2

## 2023-04-13 DIAGNOSIS — L82.1 SEBORRHEIC KERATOSES: ICD-10-CM

## 2023-04-13 DIAGNOSIS — L57.0 ACTINIC KERATOSES: ICD-10-CM

## 2023-04-13 DIAGNOSIS — L81.4 SOLAR LENTIGO: ICD-10-CM

## 2023-04-13 DIAGNOSIS — D18.01 CHERRY ANGIOMA: ICD-10-CM

## 2023-04-13 DIAGNOSIS — Z85.828 HISTORY OF NONMELANOMA SKIN CANCER: ICD-10-CM

## 2023-04-13 DIAGNOSIS — D48.5 NEOPLASM OF UNCERTAIN BEHAVIOR OF SKIN: Primary | ICD-10-CM

## 2023-04-13 PROCEDURE — 88305 TISSUE EXAM BY PATHOLOGIST: CPT | Mod: 26,,, | Performed by: PATHOLOGY

## 2023-04-13 PROCEDURE — 11102 TANGNTL BX SKIN SINGLE LES: CPT | Mod: S$GLB,,, | Performed by: DERMATOLOGY

## 2023-04-13 PROCEDURE — 1160F PR REVIEW ALL MEDS BY PRESCRIBER/CLIN PHARMACIST DOCUMENTED: ICD-10-PCS | Mod: CPTII,S$GLB,, | Performed by: DERMATOLOGY

## 2023-04-13 PROCEDURE — 3288F FALL RISK ASSESSMENT DOCD: CPT | Mod: CPTII,S$GLB,, | Performed by: DERMATOLOGY

## 2023-04-13 PROCEDURE — 3008F PR BODY MASS INDEX (BMI) DOCUMENTED: ICD-10-PCS | Mod: CPTII,S$GLB,, | Performed by: DERMATOLOGY

## 2023-04-13 PROCEDURE — 88341 PR IHC OR ICC EACH ADD'L SINGLE ANTIBODY  STAINPR: ICD-10-PCS | Mod: 26,,, | Performed by: PATHOLOGY

## 2023-04-13 PROCEDURE — 17000 PR DESTRUCTION(LASER SURGERY,CRYOSURGERY,CHEMOSURGERY),PREMALIGNANT LESIONS,FIRST LESION: ICD-10-PCS | Mod: XS,S$GLB,, | Performed by: DERMATOLOGY

## 2023-04-13 PROCEDURE — 88342 IMHCHEM/IMCYTCHM 1ST ANTB: CPT | Mod: 26,,, | Performed by: PATHOLOGY

## 2023-04-13 PROCEDURE — 99999 PR PBB SHADOW E&M-EST. PATIENT-LVL III: CPT | Mod: PBBFAC,,, | Performed by: DERMATOLOGY

## 2023-04-13 PROCEDURE — 17000 DESTRUCT PREMALG LESION: CPT | Mod: XS,S$GLB,, | Performed by: DERMATOLOGY

## 2023-04-13 PROCEDURE — 1101F PT FALLS ASSESS-DOCD LE1/YR: CPT | Mod: CPTII,S$GLB,, | Performed by: DERMATOLOGY

## 2023-04-13 PROCEDURE — 1159F MED LIST DOCD IN RCRD: CPT | Mod: CPTII,S$GLB,, | Performed by: DERMATOLOGY

## 2023-04-13 PROCEDURE — 88305 TISSUE EXAM BY PATHOLOGIST: ICD-10-PCS | Mod: 26,,, | Performed by: PATHOLOGY

## 2023-04-13 PROCEDURE — 17003 DESTRUCTION, PREMALIGNANT LESIONS; SECOND THROUGH 14 LESIONS: ICD-10-PCS | Mod: S$GLB,,, | Performed by: DERMATOLOGY

## 2023-04-13 PROCEDURE — 99999 PR PBB SHADOW E&M-EST. PATIENT-LVL III: ICD-10-PCS | Mod: PBBFAC,,, | Performed by: DERMATOLOGY

## 2023-04-13 PROCEDURE — 1101F PR PT FALLS ASSESS DOC 0-1 FALLS W/OUT INJ PAST YR: ICD-10-PCS | Mod: CPTII,S$GLB,, | Performed by: DERMATOLOGY

## 2023-04-13 PROCEDURE — 3008F BODY MASS INDEX DOCD: CPT | Mod: CPTII,S$GLB,, | Performed by: DERMATOLOGY

## 2023-04-13 PROCEDURE — 1159F PR MEDICATION LIST DOCUMENTED IN MEDICAL RECORD: ICD-10-PCS | Mod: CPTII,S$GLB,, | Performed by: DERMATOLOGY

## 2023-04-13 PROCEDURE — 88341 IMHCHEM/IMCYTCHM EA ADD ANTB: CPT | Performed by: PATHOLOGY

## 2023-04-13 PROCEDURE — 88341 IMHCHEM/IMCYTCHM EA ADD ANTB: CPT | Mod: 26,,, | Performed by: PATHOLOGY

## 2023-04-13 PROCEDURE — 99213 OFFICE O/P EST LOW 20 MIN: CPT | Mod: 25,S$GLB,, | Performed by: DERMATOLOGY

## 2023-04-13 PROCEDURE — 17003 DESTRUCT PREMALG LES 2-14: CPT | Mod: S$GLB,,, | Performed by: DERMATOLOGY

## 2023-04-13 PROCEDURE — 3288F PR FALLS RISK ASSESSMENT DOCUMENTED: ICD-10-PCS | Mod: CPTII,S$GLB,, | Performed by: DERMATOLOGY

## 2023-04-13 PROCEDURE — 88342 IMHCHEM/IMCYTCHM 1ST ANTB: CPT | Performed by: PATHOLOGY

## 2023-04-13 PROCEDURE — 88305 TISSUE EXAM BY PATHOLOGIST: CPT | Performed by: PATHOLOGY

## 2023-04-13 PROCEDURE — 1160F RVW MEDS BY RX/DR IN RCRD: CPT | Mod: CPTII,S$GLB,, | Performed by: DERMATOLOGY

## 2023-04-13 PROCEDURE — 88342 CHG IMMUNOCYTOCHEMISTRY: ICD-10-PCS | Mod: 26,,, | Performed by: PATHOLOGY

## 2023-04-13 PROCEDURE — 11102 PR TANGENTIAL BIOPSY, SKIN, SINGLE LESION: ICD-10-PCS | Mod: S$GLB,,, | Performed by: DERMATOLOGY

## 2023-04-13 PROCEDURE — 99213 PR OFFICE/OUTPT VISIT, EST, LEVL III, 20-29 MIN: ICD-10-PCS | Mod: 25,S$GLB,, | Performed by: DERMATOLOGY

## 2023-04-13 NOTE — PROGRESS NOTES
Subjective:      Patient ID:  Gaby Donohue is a 68 y.o. female who presents for   Chief Complaint   Patient presents with    Skin Check     UBSC    Bumps     LOV 3/8/23 BCC of right temple (Matthew)  Previously under care of Dr Wagner    Patient here for UBSC  C/O bump on back, under left eye and left side nose    Derm Hx:  BCC right temple- 3/8/23 Matthew  4 Mohs operations to face over the past 20 years  Denies Fhx MM    Current Outpatient Medications:   ·  DULoxetine (CYMBALTA) 30 MG capsule, TAKE THREE CAPSULES (90MG) BY MOUTH ONCE DAILY, Disp: 90 capsule, Rfl: 2  ·  omeprazole (PRILOSEC) 40 MG capsule, Take 1 capsule (40 mg total) by mouth every morning., Disp: 30 capsule, Rfl: 2  ·  ALPRAZolam (XANAX) 0.25 MG tablet, Take one up to three times daily 30 minutes before takeoff for anxiety with flying, Disp: 20 tablet, Rfl: 0  ·  clobetasoL (TEMOVATE) 0.05 % cream, Apply twice a day to rash on palms for 3 wks or less only, Disp: 60 g, Rfl: 5       Review of Systems   Constitutional:  Negative for fever, chills and fatigue.   Skin:  Positive for dry skin, activity-related sunscreen use and wears hat. Negative for itching and daily sunscreen use.     Objective:   Physical Exam   Constitutional: She appears well-developed and well-nourished. No distress.   Neurological: She is alert and oriented to person, place, and time. She is not disoriented.   Psychiatric: She has a normal mood and affect.   Skin:   Areas Examined (abnormalities noted in diagram):   Scalp / Hair Palpated and Inspected  Head / Face Inspection Performed  Neck Inspection Performed  Chest / Axilla Inspection Performed  Abdomen Inspection Performed  Back Inspection Performed  RUE Inspected  LUE Inspection Performed  Nails and Digits Inspection Performed               Diagram Legend     Erythematous scaling macule/papule c/w actinic keratosis       Vascular papule c/w angioma      Pigmented verrucoid papule/plaque c/w seborrheic keratosis       Yellow umbilicated papule c/w sebaceous hyperplasia      Irregularly shaped tan macule c/w lentigo     1-2 mm smooth white papules consistent with Milia      Movable subcutaneous cyst with punctum c/w epidermal inclusion cyst      Subcutaneous movable cyst c/w pilar cyst      Firm pink to brown papule c/w dermatofibroma      Pedunculated fleshy papule(s) c/w skin tag(s)      Evenly pigmented macule c/w junctional nevus     Mildly variegated pigmented, slightly irregular-bordered macule c/w mildly atypical nevus      Flesh colored to evenly pigmented papule c/w intradermal nevus       Pink pearly papule/plaque c/w basal cell carcinoma      Erythematous hyperkeratotic cursted plaque c/w SCC      Surgical scar with no sign of skin cancer recurrence      Open and closed comedones      Inflammatory papules and pustules      Verrucoid papule consistent consistent with wart     Erythematous eczematous patches and plaques     Dystrophic onycholytic nail with subungual debris c/w onychomycosis     Umbilicated papule    Erythematous-base heme-crusted tan verrucoid plaque consistent with inflamed seborrheic keratosis     Erythematous Silvery Scaling Plaque c/w Psoriasis     See annotation        Assessment / Plan:      Pathology Orders:       Normal Orders This Visit    Specimen to Pathology, Dermatology     Questions:    Procedure Type: Dermatology and skin neoplasms    Number of Specimens: 1    ------------------------: -------------------------    Spec 1 Procedure: Biopsy    Spec 1 Clinical Impression: ISK vs VV vs SCC    Spec 1 Source: left naso facial sulcus    Release to patient:           Neoplasm of uncertain behavior of skin  -     Specimen to Pathology, Dermatology  Shave biopsy procedure note:    Shave biopsy performed after verbal consent including risk of infection, scar, recurrence, need for additional treatment of site. Area prepped with alcohol, anesthetized with approximately 1.0cc of 1% lidocaine with  epinephrine. Lesional tissue shaved with razor blade. Hemostasis achieved with application of aluminum chloride followed by hyfrecation. No complications. Dressing applied. Wound care explained.    Actinic keratoses  Cryosurgery Procedure Note    Verbal consent from the patient is obtained and the patient is aware of the precancerous quality and need for treatment of these lesions. Liquid nitrogen cryosurgery is applied to the 3 actinic keratoses, as detailed in the physical exam, to produce a freeze injury. The patient is aware that blisters may form and is instructed on wound care with gentle cleansing and use of vaseline ointment to keep moist until healed. The patient is supplied a handout on cryosurgery and is instructed to call if lesions do not completely resolve.    Seborrheic keratoses  These are benign inherited growths without a malignant potential. Reassurance given to patient. No treatment is necessary.     History of nonmelanoma skin cancer  Area of previous NMSC (multiple) examined. Sites well healed with no signs of recurrence.  Upper body skin examination performed today including at least 9 points as noted in physical examination. 1 lesion suspicious for malignancy noted.    Solar lentigo  This is a benign hyperpigmented sun induced lesion. Daily sun protection will reduce the number of new lesions. Treatment of these benign lesions are considered cosmetic.    Cherry angioma  This is a benign vascular lesion. Reassurance given. No treatment required.     Patient instructed in importance in daily broad spectrum sun protection of at least spf 30. Mineral sunscreen ingredients preferred (Zinc +/- Titanium) and can be found OTC.   Recommend Elta MD for daily use on face and neck.  Patient encouraged to wear hat for all outdoor exposure.   Also discussed sun avoidance and use of protective clothing.           Follow up if symptoms worsen or fail to improve.

## 2023-04-13 NOTE — PATIENT INSTRUCTIONS
Shave Biopsy Wound Care    Your doctor has performed a shave biopsy today.  A band aid and vaseline ointment has been placed over the site.  This should remain in place for 24 hours.  It is recommended that you keep the area dry for the first 24 hours.  After 24 hours, you may remove the band aid and wash the area with warm soap and water and apply Vaseline jelly.  Many patients prefer to use Neosporin or Bacitracin ointment.  This is acceptable; however, know that you can develop an allergy to this medication even if you have used it safely for years.  It is important to keep the area moist.  Letting it dry out and get air slows healing time, and will worsen the scar.  Band aid is optional after first 24 hours.      If you notice increasing redness, tenderness, pain, or yellow drainage at the biopsy site, please notify your doctor.  These are signs of an infection.    If your biopsy site is bleeding, apply firm pressure for 15 minutes straight.  Repeat for another 15 minutes, if it is still bleeding.   If the surgical site continues to bleed, then please contact your doctor.       [facility] DERMATOLOGY  [address] [place] [address], [address] [address]  Dept: [phone]      CRYOSURGERY      Your doctor has used a method called cryosurgery to treat your skin condition. Cryosurgery refers to the use of very cold substances to treat a variety of skin conditions such as warts, pre-skin cancers, molluscum contagiosum, sun spots, and several benign growths. The substance we use in cryosurgery is liquid nitrogen and is so cold (-195 degrees Celsius) that is burns when administered.     Following treatment in the office, the skin may immediately burn and become red. You may find the area around the lesion is affected as well. It is sometimes necessary to treat not only the lesion, but a small area of the surrounding normal skin to achieve a good response.     A blister, and even a blood  filled blister, may form after treatment.   This is a normal response. If the blister is painful, it is acceptable to sterilize a needle and with rubbing alcohol and gently pop the blister. It is important that you gently wash the area with soap and warm water as the blister fluid may contain wart virus if a wart was treated. Do no remove the roof of the blister.     The area treated can take anywhere from 1-3 weeks to heal. Healing time depends on the kind skin lesion treated, the location, and how aggressively the lesion was treated. It is recommended that the areas treated are covered with Vaseline or bacitracin ointment and a band-aid. If a band-aid is not practical, just ointment applied several times per day will do. Keeping these areas moist will speed the healing time.    Treatment with liquid nitrogen can leave a scar. In dark skin, it may be a light or dark scar, in light skin it may be a white or pink scar. These will generally fade with time, but may never go away completely.     If you have any concerns after your treatment, please feel free to call the office.         [facility] - DERMATOLOGY  [address] [place] [id number]  Dept: [phone]

## 2023-04-24 LAB
FINAL PATHOLOGIC DIAGNOSIS: NORMAL
GROSS: NORMAL
Lab: NORMAL
MICROSCOPIC EXAM: NORMAL

## 2023-04-26 ENCOUNTER — TELEPHONE (OUTPATIENT)
Dept: DERMATOLOGY | Facility: CLINIC | Age: 68
End: 2023-04-26
Payer: COMMERCIAL

## 2023-04-26 NOTE — TELEPHONE ENCOUNTER
Returned call to patient, reviewed results. Follow up appointment scheduled. No further questions.     ----- Message from Vandana Duarte sent at 4/25/2023  4:06 PM CDT -----  Regarding: return call  Contact: Patient  Type:  Patient Returning Call    Who Called:  Patient   Who Left Message for Patient:    Does the patient know what this is regarding?:    Best Call Back Number:  [phone] (home)     Additional Information:  Patient stated that she missed a call from nurse and she was returning her call. Please contact patient to advise. Thanks!

## 2023-06-02 ENCOUNTER — TELEPHONE (OUTPATIENT)
Dept: FAMILY MEDICINE | Facility: CLINIC | Age: 68
End: 2023-06-02
Payer: COMMERCIAL

## 2023-06-02 NOTE — TELEPHONE ENCOUNTER
Lianna NAVAS Staff    ----- Message from Lianna Jackman sent at 6/2/2023 12:06 PM CDT -----  Contact: pt  Type:  Patient Returning Call    Who Called:  pt   Who Left Message for Patient:  damon  Does the patient know what this is regarding?:  yes  Best Call Back Number:  [phone]    Additional Information:  pt is trying to return a call. Please advise.

## 2023-06-02 NOTE — TELEPHONE ENCOUNTER
Kendall NAVAS Staff    ----- Message from Kendall Diamond sent at 6/2/2023 10:47 AM CDT -----  Contact: pt at [phone]  Type: Needs Medical Advice  Who Called:  pt  Best Call Back Number: [phone]  Additional Information: pt is calling the office regarding some medication and she would like the nurse to call her back.

## 2023-06-02 NOTE — TELEPHONE ENCOUNTER
Patient states she is going on a trip and has to fly. States in the past she has received a prescription for Xanax when she has to fly. Patient states she leaves for her trip on Monday. Requesting to know if Xanax prescription can be granted. Preferred pharmacy is [facility] on file. Please advise. Thank you.

## 2023-06-02 NOTE — TELEPHONE ENCOUNTER
Call placed to patient x's 2. Line rings once and goes directly to voicemail.  Left message requesting return call to office.

## 2023-06-05 NOTE — TELEPHONE ENCOUNTER
Patient used all of the Xanax she got in December. She says she uses it during flight and when she is there for stressful situation.

## 2023-06-13 DIAGNOSIS — R14.2 BELCHING: ICD-10-CM

## 2023-06-13 DIAGNOSIS — M25.531 RIGHT WRIST PAIN: Primary | ICD-10-CM

## 2023-06-13 DIAGNOSIS — K21.9 GASTROESOPHAGEAL REFLUX DISEASE WITHOUT ESOPHAGITIS: ICD-10-CM

## 2023-06-13 DIAGNOSIS — R12 HEARTBURN: ICD-10-CM

## 2023-06-14 RX ORDER — OMEPRAZOLE 40 MG/1
40 CAPSULE, DELAYED RELEASE ORAL EVERY MORNING
Qty: 90 CAPSULE | Refills: 0 | Status: SHIPPED | OUTPATIENT
Start: 2023-06-14 | End: 2023-09-12

## 2023-06-16 ENCOUNTER — HOSPITAL ENCOUNTER (OUTPATIENT)
Dept: RADIOLOGY | Facility: HOSPITAL | Age: 68
Discharge: HOME | End: 2023-06-16
Attending: ORTHOPAEDIC SURGERY
Payer: COMMERCIAL

## 2023-06-16 ENCOUNTER — PATIENT MESSAGE (OUTPATIENT)
Dept: ORTHOPEDICS | Facility: CLINIC | Age: 68
End: 2023-06-16

## 2023-06-16 ENCOUNTER — OFFICE VISIT (OUTPATIENT)
Dept: ORTHOPEDICS | Facility: CLINIC | Age: 68
End: 2023-06-16
Payer: COMMERCIAL

## 2023-06-16 VITALS — WEIGHT: 175.94 LBS | BODY MASS INDEX: 30.04 KG/M2 | HEIGHT: 64 IN

## 2023-06-16 DIAGNOSIS — M25.531 RIGHT WRIST PAIN: ICD-10-CM

## 2023-06-16 DIAGNOSIS — S52.531A CLOSED COLLES' FRACTURE OF RIGHT RADIUS, INITIAL ENCOUNTER: Primary | ICD-10-CM

## 2023-06-16 PROCEDURE — 1125F PR PAIN SEVERITY QUANTIFIED, PAIN PRESENT: ICD-10-PCS | Mod: CPTII,S$GLB,, | Performed by: ORTHOPAEDIC SURGERY

## 2023-06-16 PROCEDURE — 99999 PR PBB SHADOW E&M-EST. PATIENT-LVL II: CPT | Mod: PBBFAC,,, | Performed by: ORTHOPAEDIC SURGERY

## 2023-06-16 PROCEDURE — 1159F PR MEDICATION LIST DOCUMENTED IN MEDICAL RECORD: ICD-10-PCS | Mod: CPTII,S$GLB,, | Performed by: ORTHOPAEDIC SURGERY

## 2023-06-16 PROCEDURE — 73110 X-RAY EXAM OF WRIST: CPT | Mod: 26,RT,, | Performed by: RADIOLOGY

## 2023-06-16 PROCEDURE — 3008F PR BODY MASS INDEX (BMI) DOCUMENTED: ICD-10-PCS | Mod: CPTII,S$GLB,, | Performed by: ORTHOPAEDIC SURGERY

## 2023-06-16 PROCEDURE — 1159F MED LIST DOCD IN RCRD: CPT | Mod: CPTII,S$GLB,, | Performed by: ORTHOPAEDIC SURGERY

## 2023-06-16 PROCEDURE — 99999 PR PBB SHADOW E&M-EST. PATIENT-LVL II: ICD-10-PCS | Mod: PBBFAC,,, | Performed by: ORTHOPAEDIC SURGERY

## 2023-06-16 PROCEDURE — 99203 PR OFFICE/OUTPT VISIT, NEW, LEVL III, 30-44 MIN: ICD-10-PCS | Mod: S$GLB,,, | Performed by: ORTHOPAEDIC SURGERY

## 2023-06-16 PROCEDURE — 3008F BODY MASS INDEX DOCD: CPT | Mod: CPTII,S$GLB,, | Performed by: ORTHOPAEDIC SURGERY

## 2023-06-16 PROCEDURE — 73110 XR WRIST COMPLETE 3 VIEWS RIGHT: ICD-10-PCS | Mod: 26,RT,, | Performed by: RADIOLOGY

## 2023-06-16 PROCEDURE — 1125F AMNT PAIN NOTED PAIN PRSNT: CPT | Mod: CPTII,S$GLB,, | Performed by: ORTHOPAEDIC SURGERY

## 2023-06-16 PROCEDURE — 73110 X-RAY EXAM OF WRIST: CPT | Mod: TC,PO,RT

## 2023-06-16 PROCEDURE — 99203 OFFICE O/P NEW LOW 30 MIN: CPT | Mod: S$GLB,,, | Performed by: ORTHOPAEDIC SURGERY

## 2023-06-16 RX ORDER — HYDROCORTISONE 25 MG/G
CREAM TOPICAL
COMMUNITY
Start: 2023-03-21 | End: 2023-06-23 | Stop reason: ALTCHOICE

## 2023-06-16 RX ORDER — IBUPROFEN 600 MG/1
600 TABLET ORAL EVERY 6 HOURS PRN
COMMUNITY
Start: 2023-06-12 | End: 2023-12-26

## 2023-06-16 NOTE — PROGRESS NOTES
6/16/2023    Past Medical History:   Diagnosis Date    Anxiety     Basal cell carcinoma     scalp and lip    Cancer     skin ca on face    Depression     GERD (gastroesophageal reflux disease)     Hyperlipidemia     Wears glasses        Past Surgical History:   Procedure Laterality Date    CHOLECYSTECTOMY      COLONOSCOPY  04/20/2012    Dr. Jaime, 5 year recheck    COLONOSCOPY N/A 06/09/2021    Procedure: COLONOSCOPY;  Surgeon: Yasmeen Goldman MD;  Location: [facility] ENDO;  Service: Endoscopy;  Laterality: N/A;    ESOPHAGOGASTRODUODENOSCOPY  08/17/2012    Dr. Jaime; gastritis; bx unremarkable    ESOPHAGOGASTRODUODENOSCOPY N/A 06/09/2021    Procedure: EGD (ESOPHAGOGASTRODUODENOSCOPY);  Surgeon: Yasmeen Goldman MD;  Location: [facility] ENDO;  Service: Endoscopy;  Laterality: N/A;    FRACTURE SURGERY  11/02/2013    left arm Dr Cooper    laporoscopy      UPPER GASTROINTESTINAL ENDOSCOPY         Current Outpatient Medications   Medication Sig    ALPRAZolam (XANAX) 0.25 MG tablet Take one up to three times daily 30 minutes before takeoff for anxiety with flying    DULoxetine (CYMBALTA) 30 MG capsule TAKE THREE CAPSULES (90MG) BY MOUTH ONCE DAILY    hydrocortisone 2.5 % cream Apply topically.    ibuprofen (ADVIL,MOTRIN) 600 MG tablet Take 600 mg by mouth every 6 (six) hours as needed.    omeprazole (PRILOSEC) 40 MG capsule Take 1 capsule (40 mg total) by mouth every morning.     No current facility-administered medications for this visit.       Review of patient's allergies indicates:   Allergen Reactions    No known drug allergies        Family History   Problem Relation Age of Onset    COPD Mother     Emphysema Mother     Heart disease Father     Cancer Sister         skin cancer face    Cancer Sister         skin cancer face    Cancer Sister         skin cancer face     Cancer Brother         skin cancer face     No Known Problems Brother     No Known Problems Daughter     No Known Problems Daughter     Colon cancer  "Neg Hx     Colon polyps Neg Hx     Esophageal cancer Neg Hx     Stomach cancer Neg Hx        Social History     Socioeconomic History    Marital status:    Tobacco Use    Smoking status: Former     Packs/day: 1.50     Years: 21.00     Pack years: 31.50     Types: Cigarettes     Quit date: 1984     Years since quittin.1    Smokeless tobacco: Never   Substance and Sexual Activity    Alcohol use: Yes     Comment: occ    Drug use: No    Sexual activity: Yes     Partners: Male       Chief Complaint:   Chief Complaint   Patient presents with    Right Wrist - Pain, Initial Visit     Right Wrist FX. DOI: 2023 - She was on the train, abruptly tripped causing her to fall on right wrist/hitting head. Patient reports able to get up with assistance, pain to right wrist. Went to ER in [place] and Splinted. Moving the Wrist is painful.         History of present illness:    This is a 68 y.o. year old female who complains of patient is being seen initially today for right wrist pain she was on the train it abruptly tripped her causing the fall onto her right wrist she went to the ER in [place] was splinted    Review of Systems:    Constitution: Denies chills, fever, and sweats.  HENT: Denies headaches or blurry vision.  Cardiovascular: Denies chest pain or irregular heart beat.  Respiratory: Denies cough or shortness of breath.  Gastrointestinal: Denies abdominal pain, nausea, or vomiting.  Musculoskeletal:  Denies muscle cramps.  Neurological: Denies dizziness or focal weakness.  Psychiatric/Behavioral: Normal mental status.  Hematologic/Lymphatic: Denies bleeding problem or easy bruising/bleeding.  Skin: Denies rash or suspicious lesions.    Examination:    Vital Signs:    Vitals:    23 0834   Weight: 79.8 kg (175 lb 14.8 oz)   Height: 5' 4" (1.626 m)   PainSc:   5   PainLoc: Wrist       Body mass index is 30.2 kg/m².    This a well-developed, well nourished patient in no acute distress.    Alert and " oriented x 3 and cooperative to examination.       Physical Exam:  Right wrist-patient is neurovascularly intact in the fingers she is in a sugar-tong splint and appears to be comfortable    Imaging:  X-rays today show a comminuted fracture involving the dorsal aspect of the distal radius with mild displacement no shortening present and minimal displacement       Assessment: Closed Colles' fracture of right radius, initial encounter        Plan:  We will keep her in the sugar-tong splint and see her back next week at that time will take the splint off and x-ray her out of the splint.  There is a possibility if she has displacement of this fracture that she may need a volar plating.  The patient is presently taking Motrin 800 mg for pain she says she does not need anything more    DISCLAIMER: This note may have been dictated using voice recognition software and may contain grammatical errors.     NOTE: Consult report sent to referring provider via [employer].

## 2023-06-19 DIAGNOSIS — S52.531A CLOSED COLLES' FRACTURE OF RIGHT RADIUS, INITIAL ENCOUNTER: Primary | ICD-10-CM

## 2023-06-19 DIAGNOSIS — S52.531D CLOSED COLLES' FRACTURE OF RIGHT RADIUS WITH ROUTINE HEALING: ICD-10-CM

## 2023-06-20 ENCOUNTER — TELEPHONE (OUTPATIENT)
Dept: ORTHOPEDICS | Facility: CLINIC | Age: 68
End: 2023-06-20
Payer: COMMERCIAL

## 2023-06-20 ENCOUNTER — TELEPHONE (OUTPATIENT)
Dept: GASTROENTEROLOGY | Facility: CLINIC | Age: 68
End: 2023-06-20
Payer: COMMERCIAL

## 2023-06-20 DIAGNOSIS — Z86.010 HISTORY OF COLON POLYPS: Primary | ICD-10-CM

## 2023-06-20 NOTE — TELEPHONE ENCOUNTER
----- Message from Luke Bauer sent at 6/20/2023 10:37 AM CDT -----  Contact: self  Type: Sooner Appointment Request        Caller is requesting a sooner appointment. Caller declined first available appointment listed below. Caller will not accept being placed on the waitlist and is requesting a message be sent to doctor.        Name of Caller: Patient   Best Call Back Number: [phone]  Additional Information: Pt states she needs a colonoscopy scheduled wants a call back from the office to know when Dr. Goldman will be available. Thanks

## 2023-06-20 NOTE — TELEPHONE ENCOUNTER
Reached out to pt to inform ppwk completed and signed by provider. Faxed to [facility] fax number [phone].

## 2023-06-20 NOTE — TELEPHONE ENCOUNTER
----- Message from Laura Elizalde sent at 6/20/2023  8:12 AM CDT -----  Regarding: Work restriction form  Patient came to drop off a form from work stating she's to be on light duty and needs her doctor to fill out the restrictions and sign form. She would like someone to fax the completed form to her job is possible. Fax # [phone] I will leave form in folder in the front. Thanks.

## 2023-06-20 NOTE — TELEPHONE ENCOUNTER
Call placed to Ms. Donohue, offered her first available if 9/1 at 1030. She accepted. Prep instructions reviewed and sent to pt portal

## 2023-06-20 NOTE — TELEPHONE ENCOUNTER
Informed we would work on her paperwork as soon as we can as we are in clinic our paperwork days are Monday and Thursday

## 2023-06-23 ENCOUNTER — HOSPITAL ENCOUNTER (OUTPATIENT)
Dept: RADIOLOGY | Facility: HOSPITAL | Age: 68
Discharge: HOME | End: 2023-06-23
Attending: ORTHOPAEDIC SURGERY
Payer: COMMERCIAL

## 2023-06-23 ENCOUNTER — OFFICE VISIT (OUTPATIENT)
Dept: ORTHOPEDICS | Facility: CLINIC | Age: 68
End: 2023-06-23
Payer: COMMERCIAL

## 2023-06-23 ENCOUNTER — HOSPITAL ENCOUNTER (OUTPATIENT)
Dept: PREADMISSION TESTING | Facility: HOSPITAL | Age: 68
Discharge: HOME | End: 2023-06-23
Attending: ORTHOPAEDIC SURGERY
Payer: COMMERCIAL

## 2023-06-23 VITALS — WEIGHT: 175.94 LBS | HEIGHT: 64 IN | BODY MASS INDEX: 30.04 KG/M2

## 2023-06-23 DIAGNOSIS — Z01.818 PRE-OP TESTING: ICD-10-CM

## 2023-06-23 DIAGNOSIS — S52.531D CLOSED COLLES' FRACTURE OF RIGHT RADIUS WITH ROUTINE HEALING: ICD-10-CM

## 2023-06-23 DIAGNOSIS — S52.531A CLOSED COLLES' FRACTURE OF RIGHT RADIUS, INITIAL ENCOUNTER: ICD-10-CM

## 2023-06-23 DIAGNOSIS — S52.531A CLOSED COLLES' FRACTURE OF RIGHT RADIUS, INITIAL ENCOUNTER: Primary | ICD-10-CM

## 2023-06-23 LAB
ALBUMIN SERPL BCP-MCNC: 3.6 G/DL (ref 3.5–5.2)
ALP SERPL-CCNC: 80 U/L (ref 55–135)
ALT SERPL W/O P-5'-P-CCNC: 18 U/L (ref 10–44)
ANION GAP SERPL CALC-SCNC: 8 MMOL/L (ref 8–16)
AST SERPL-CCNC: 22 U/L (ref 10–40)
BASOPHILS # BLD AUTO: 0.06 K/UL (ref 0–0.2)
BASOPHILS NFR BLD: 1.1 % (ref 0–1.9)
BILIRUB SERPL-MCNC: 0.5 MG/DL (ref 0.1–1)
BUN SERPL-MCNC: 12 MG/DL (ref 8–23)
CALCIUM SERPL-MCNC: 9.3 MG/DL (ref 8.7–10.5)
CHLORIDE SERPL-SCNC: 101 MMOL/L (ref 95–110)
CO2 SERPL-SCNC: 28 MMOL/L (ref 23–29)
CREAT SERPL-MCNC: 0.7 MG/DL (ref 0.5–1.4)
DIFFERENTIAL METHOD: NORMAL
EOSINOPHIL # BLD AUTO: 0.1 K/UL (ref 0–0.5)
EOSINOPHIL NFR BLD: 2 % (ref 0–8)
ERYTHROCYTE [DISTWIDTH] IN BLOOD BY AUTOMATED COUNT: 12 % (ref 11.5–14.5)
EST. GFR  (NO RACE VARIABLE): >60 ML/MIN/1.73 M^2
GLUCOSE SERPL-MCNC: 89 MG/DL (ref 70–110)
HCT VFR BLD AUTO: 41.5 % (ref 37–48.5)
HGB BLD-MCNC: 13.9 G/DL (ref 12–16)
IMM GRANULOCYTES # BLD AUTO: 0.01 K/UL (ref 0–0.04)
IMM GRANULOCYTES NFR BLD AUTO: 0.2 % (ref 0–0.5)
LYMPHOCYTES # BLD AUTO: 1.8 K/UL (ref 1–4.8)
LYMPHOCYTES NFR BLD: 33.3 % (ref 18–48)
MCH RBC QN AUTO: 30.8 PG (ref 27–31)
MCHC RBC AUTO-ENTMCNC: 33.5 G/DL (ref 32–36)
MCV RBC AUTO: 92 FL (ref 82–98)
MONOCYTES # BLD AUTO: 0.6 K/UL (ref 0.3–1)
MONOCYTES NFR BLD: 10.7 % (ref 4–15)
NEUTROPHILS # BLD AUTO: 2.9 K/UL (ref 1.8–7.7)
NEUTROPHILS NFR BLD: 52.7 % (ref 38–73)
NRBC BLD-RTO: 0 /100 WBC
PLATELET # BLD AUTO: 277 K/UL (ref 150–450)
PMV BLD AUTO: 10.3 FL (ref 9.2–12.9)
POTASSIUM SERPL-SCNC: 4.6 MMOL/L (ref 3.5–5.1)
PROT SERPL-MCNC: 6.8 G/DL (ref 6–8.4)
RBC # BLD AUTO: 4.51 M/UL (ref 4–5.4)
SODIUM SERPL-SCNC: 137 MMOL/L (ref 136–145)
WBC # BLD AUTO: 5.5 K/UL (ref 3.9–12.7)

## 2023-06-23 PROCEDURE — 99213 PR OFFICE/OUTPT VISIT, EST, LEVL III, 20-29 MIN: ICD-10-PCS | Mod: S$GLB,,, | Performed by: ORTHOPAEDIC SURGERY

## 2023-06-23 PROCEDURE — 99213 OFFICE O/P EST LOW 20 MIN: CPT | Mod: S$GLB,,, | Performed by: ORTHOPAEDIC SURGERY

## 2023-06-23 PROCEDURE — 73110 XR WRIST COMPLETE 3 VIEWS RIGHT: ICD-10-PCS | Mod: 26,RT,, | Performed by: RADIOLOGY

## 2023-06-23 PROCEDURE — 71046 X-RAY EXAM CHEST 2 VIEWS: CPT | Mod: 26,,, | Performed by: RADIOLOGY

## 2023-06-23 PROCEDURE — 93005 ELECTROCARDIOGRAM TRACING: CPT

## 2023-06-23 PROCEDURE — 1125F AMNT PAIN NOTED PAIN PRSNT: CPT | Mod: CPTII,S$GLB,, | Performed by: ORTHOPAEDIC SURGERY

## 2023-06-23 PROCEDURE — 1125F PR PAIN SEVERITY QUANTIFIED, PAIN PRESENT: ICD-10-PCS | Mod: CPTII,S$GLB,, | Performed by: ORTHOPAEDIC SURGERY

## 2023-06-23 PROCEDURE — 1101F PR PT FALLS ASSESS DOC 0-1 FALLS W/OUT INJ PAST YR: ICD-10-PCS | Mod: CPTII,S$GLB,, | Performed by: ORTHOPAEDIC SURGERY

## 2023-06-23 PROCEDURE — 80053 COMPREHEN METABOLIC PANEL: CPT | Mod: 91 | Performed by: ORTHOPAEDIC SURGERY

## 2023-06-23 PROCEDURE — 3288F FALL RISK ASSESSMENT DOCD: CPT | Mod: CPTII,S$GLB,, | Performed by: ORTHOPAEDIC SURGERY

## 2023-06-23 PROCEDURE — 1101F PT FALLS ASSESS-DOCD LE1/YR: CPT | Mod: CPTII,S$GLB,, | Performed by: ORTHOPAEDIC SURGERY

## 2023-06-23 PROCEDURE — 71046 X-RAY EXAM CHEST 2 VIEWS: CPT | Mod: TC,FY

## 2023-06-23 PROCEDURE — 85025 COMPLETE CBC W/AUTO DIFF WBC: CPT | Mod: 91 | Performed by: ORTHOPAEDIC SURGERY

## 2023-06-23 PROCEDURE — 93010 ELECTROCARDIOGRAM REPORT: CPT | Mod: ,,, | Performed by: SPECIALIST

## 2023-06-23 PROCEDURE — 3008F PR BODY MASS INDEX (BMI) DOCUMENTED: ICD-10-PCS | Mod: CPTII,S$GLB,, | Performed by: ORTHOPAEDIC SURGERY

## 2023-06-23 PROCEDURE — 71046 XR CHEST PA AND LATERAL: ICD-10-PCS | Mod: 26,,, | Performed by: RADIOLOGY

## 2023-06-23 PROCEDURE — 93010 EKG 12-LEAD: ICD-10-PCS | Mod: ,,, | Performed by: SPECIALIST

## 2023-06-23 PROCEDURE — 73110 X-RAY EXAM OF WRIST: CPT | Mod: TC,PO,RT

## 2023-06-23 PROCEDURE — 99999 PR PBB SHADOW E&M-EST. PATIENT-LVL III: ICD-10-PCS | Mod: PBBFAC,,, | Performed by: ORTHOPAEDIC SURGERY

## 2023-06-23 PROCEDURE — 1159F MED LIST DOCD IN RCRD: CPT | Mod: CPTII,S$GLB,, | Performed by: ORTHOPAEDIC SURGERY

## 2023-06-23 PROCEDURE — 99900103 DSU ONLY-NO CHARGE-INITIAL HR (STAT)

## 2023-06-23 PROCEDURE — 1159F PR MEDICATION LIST DOCUMENTED IN MEDICAL RECORD: ICD-10-PCS | Mod: CPTII,S$GLB,, | Performed by: ORTHOPAEDIC SURGERY

## 2023-06-23 PROCEDURE — 73110 X-RAY EXAM OF WRIST: CPT | Mod: 26,RT,, | Performed by: RADIOLOGY

## 2023-06-23 PROCEDURE — 3288F PR FALLS RISK ASSESSMENT DOCUMENTED: ICD-10-PCS | Mod: CPTII,S$GLB,, | Performed by: ORTHOPAEDIC SURGERY

## 2023-06-23 PROCEDURE — 3008F BODY MASS INDEX DOCD: CPT | Mod: CPTII,S$GLB,, | Performed by: ORTHOPAEDIC SURGERY

## 2023-06-23 PROCEDURE — 99999 PR PBB SHADOW E&M-EST. PATIENT-LVL III: CPT | Mod: PBBFAC,,, | Performed by: ORTHOPAEDIC SURGERY

## 2023-06-23 PROCEDURE — 99900104 DSU ONLY-NO CHARGE-EA ADD'L HR (STAT)

## 2023-06-23 NOTE — PROGRESS NOTES
6/23/2023    Past Medical History:   Diagnosis Date    Anxiety     Basal cell carcinoma     scalp and lip    Cancer     skin ca on face    Depression     GERD (gastroesophageal reflux disease)     Hyperlipidemia     Wears glasses        Past Surgical History:   Procedure Laterality Date    CHOLECYSTECTOMY      COLONOSCOPY  04/20/2012    Dr. Jaime, 5 year recheck    COLONOSCOPY N/A 06/09/2021    Procedure: COLONOSCOPY;  Surgeon: Yasmeen Goldman MD;  Location: [facility] ENDO;  Service: Endoscopy;  Laterality: N/A;    ESOPHAGOGASTRODUODENOSCOPY  08/17/2012    Dr. Jaime; gastritis; bx unremarkable    ESOPHAGOGASTRODUODENOSCOPY N/A 06/09/2021    Procedure: EGD (ESOPHAGOGASTRODUODENOSCOPY);  Surgeon: Yasmeen Goldman MD;  Location: [facility] ENDO;  Service: Endoscopy;  Laterality: N/A;    FRACTURE SURGERY  11/02/2013    left arm Dr Cooper    laporoscopy      UPPER GASTROINTESTINAL ENDOSCOPY         Current Outpatient Medications   Medication Sig    ALPRAZolam (XANAX) 0.25 MG tablet Take one up to three times daily 30 minutes before takeoff for anxiety with flying    DULoxetine (CYMBALTA) 30 MG capsule TAKE THREE CAPSULES (90MG) BY MOUTH ONCE DAILY    hydrocortisone 2.5 % cream Apply topically.    ibuprofen (ADVIL,MOTRIN) 600 MG tablet Take 600 mg by mouth every 6 (six) hours as needed.    omeprazole (PRILOSEC) 40 MG capsule Take 1 capsule (40 mg total) by mouth every morning.     No current facility-administered medications for this visit.       Review of patient's allergies indicates:   Allergen Reactions    No known drug allergies        Family History   Problem Relation Age of Onset    COPD Mother     Emphysema Mother     Heart disease Father     Cancer Sister         skin cancer face    Cancer Sister         skin cancer face    Cancer Sister         skin cancer face     Cancer Brother         skin cancer face     No Known Problems Brother     No Known Problems Daughter     No Known Problems Daughter     Colon cancer  "Neg Hx     Colon polyps Neg Hx     Esophageal cancer Neg Hx     Stomach cancer Neg Hx        Social History     Socioeconomic History    Marital status:    Tobacco Use    Smoking status: Former     Packs/day: 1.50     Years: 21.00     Pack years: 31.50     Types: Cigarettes     Quit date: 1984     Years since quittin.2    Smokeless tobacco: Never   Substance and Sexual Activity    Alcohol use: Yes     Comment: occ    Drug use: No    Sexual activity: Yes     Partners: Male       Chief Complaint:   Chief Complaint   Patient presents with    Right Wrist - Follow-up     DOI: 2023 - 11 days s/p Right Wrist FX. Patient reports 2/10 pain depending on ROM of wrist/arm.          History of present illness:    This is a 68 y.o. year old female who complains of patient is status post distal radius fracture of the right wrist her date of injury was date of injury was 2023 she is now 11 days post injury    Review of Systems:    Constitution: Denies chills, fever, and sweats.  HENT: Denies headaches or blurry vision.  Cardiovascular: Denies chest pain or irregular heart beat.  Respiratory: Denies cough or shortness of breath.  Gastrointestinal: Denies abdominal pain, nausea, or vomiting.  Musculoskeletal:  Denies muscle cramps.  Neurological: Denies dizziness or focal weakness.  Psychiatric/Behavioral: Normal mental status.  Hematologic/Lymphatic: Denies bleeding problem or easy bruising/bleeding.  Skin: Denies rash or suspicious lesions.    Examination:    Vital Signs:    Vitals:    23 0937   Weight: 79.8 kg (175 lb 14.8 oz)   Height: 5' 4" (1.626 m)   PainSc:   2   PainLoc: Wrist       Body mass index is 30.2 kg/m².    This a well-developed, well nourished patient in no acute distress.    Alert and oriented x 3 and cooperative to examination.       Physical Exam:  Right wrist-patient has tenderness at the fracture site she is neurovascularly intact in the fingers    Imaging:  Her x-ray " today shows some loss of volar tilt and some shortening and radial shortening and displacement and comminution       Assessment: Closed Colles' fracture of right radius, initial encounter        Plan:  I would recommend a volar plating of this fracture the patient has had a volar plate on the other wrist in the past has done well and she wishes to proceed with the volar plating.  We will schedule her for this Monday.      DISCLAIMER: This note may have been dictated using voice recognition software and may contain grammatical errors.     NOTE: Consult report sent to referring provider via [employer].

## 2023-06-23 NOTE — DISCHARGE INSTRUCTIONS
To confirm, Your doctor has instructed you that surgery is scheduled for: 6/26/23    Please report to [facility], Registration the morning of surgery. You must check-in and receive a wristband before going to your procedure.    Pre-Op will call the afternoon prior to surgery between 1:00 and 6:00 PM with the final arrival time.  Phone number: [phone]    PLEASE NOTE:  The surgery schedule has many variables which may affect the time of your surgery case.  Family members should be available if your surgery time changes.  Plan to be here the day of your procedure between 4-6 hours.    MEDICATIONS:  TAKE ONLY THESE MEDICATIONS WITH A SMALL SIP OF WATER THE MORNING OF YOUR PROCEDURE:    SEE MED LIST      DO NOT TAKE THESE MEDICATIONS 5-7 DAYS PRIOR to your procedure or per your surgeon's request:   ASPIRIN, ALEVE, ADVIL, IBUPROFEN, FISH OIL VITAMIN E, HERBALS  (May take Tylenol)    ONLY if you are prescribed any types of blood thinners such as:  Aspirin, Coumadin, Plavix, Pradaxa, Xarelto, Aggrenox, Effient, Eliquis, Savasya, Brilinta, or any other, ask your surgeon whether you should stop taking them and how long before surgery you should stop.  You may also need to verify with the prescribing physician if it is ok to stop your medication.      INSTRUCTIONS IMPORTANT!!  Do not eat or drink anything between midnight and the time of your procedure- this includes gum, mints, and candy.  Do not smoke or drink alcoholic beverages 24 hours prior to your procedure.  Shower the night before AND the morning of your procedure with a Chlorhexidine wash such as Hibiclens or Dial antibacterial soap from the neck down.  Do not get it on your face or in your eyes.  You may use your own shampoo and face wash. This helps your skin to be as bacteria free as possible.    If you wear contact lenses, dentures, hearing aids or glasses, bring a container to put them in during surgery and give to a family member for  safe keeping.  Please leave all jewelry, piercing's and valuables at home. You must remove your false eyelashes prior to surgery.    DO NOT remove hair from the surgery site.  Do not shave the incision site unless you are given specific instructions to do so.    ONLY if you have been diagnosed with sleep apnea please bring your C-PAP machine.  ONLY if you wear home oxygen please bring your portable oxygen tank the day of your procedure.  ONLY if you have a history of OPEN HEART SURGERY you will need a clearance from your Cardiologist per Anesthesia.      ONLY for patients requiring bowel prep, written instructions will be given by your doctor's office.  ONLY if you have a neuro stimulator, please bring the controller with you the morning of surgery  ONLY if a type and screen test is needed before surgery, please return:  If your doctor has scheduled you for an overnight stay, bring a small overnight bag with any personal items you need.  Make arrangements in advance for transportation home by a responsible adult.  It is not safe to drive a vehicle during the 24 hours after anesthesia.      [facility] Visitor Policy    Effective September 26, 2022    [facility] will resume routine visitation for COVID-19 negative patients, including inpatients, outpatients, and procedural areas, in accordance with local campus procedures.    All [facility] facilities and properties are tobacco free.  Smoking is NOT allowed.   If you have any questions about these instructions, call Pre-Op Admit  Nursing at [phone] or the Pre-Op Day Surgery Unit at [phone].

## 2023-06-23 NOTE — H&P (VIEW-ONLY)
6/23/2023    Past Medical History:   Diagnosis Date    Anxiety     Basal cell carcinoma     scalp and lip    Cancer     skin ca on face    Depression     GERD (gastroesophageal reflux disease)     Hyperlipidemia     Wears glasses        Past Surgical History:   Procedure Laterality Date    CHOLECYSTECTOMY      COLONOSCOPY  04/20/2012    Dr. Jaime, 5 year recheck    COLONOSCOPY N/A 06/09/2021    Procedure: COLONOSCOPY;  Surgeon: Yasmeen Goldman MD;  Location: [facility] ENDO;  Service: Endoscopy;  Laterality: N/A;    ESOPHAGOGASTRODUODENOSCOPY  08/17/2012    Dr. Jaime; gastritis; bx unremarkable    ESOPHAGOGASTRODUODENOSCOPY N/A 06/09/2021    Procedure: EGD (ESOPHAGOGASTRODUODENOSCOPY);  Surgeon: Yasmeen Goldman MD;  Location: [facility] ENDO;  Service: Endoscopy;  Laterality: N/A;    FRACTURE SURGERY  11/02/2013    left arm Dr Cooper    laporoscopy      UPPER GASTROINTESTINAL ENDOSCOPY         Current Outpatient Medications   Medication Sig    ALPRAZolam (XANAX) 0.25 MG tablet Take one up to three times daily 30 minutes before takeoff for anxiety with flying    DULoxetine (CYMBALTA) 30 MG capsule TAKE THREE CAPSULES (90MG) BY MOUTH ONCE DAILY    hydrocortisone 2.5 % cream Apply topically.    ibuprofen (ADVIL,MOTRIN) 600 MG tablet Take 600 mg by mouth every 6 (six) hours as needed.    omeprazole (PRILOSEC) 40 MG capsule Take 1 capsule (40 mg total) by mouth every morning.     No current facility-administered medications for this visit.       Review of patient's allergies indicates:   Allergen Reactions    No known drug allergies        Family History   Problem Relation Age of Onset    COPD Mother     Emphysema Mother     Heart disease Father     Cancer Sister         skin cancer face    Cancer Sister         skin cancer face    Cancer Sister         skin cancer face     Cancer Brother         skin cancer face     No Known Problems Brother     No Known Problems Daughter     No Known Problems Daughter     Colon cancer  "Neg Hx     Colon polyps Neg Hx     Esophageal cancer Neg Hx     Stomach cancer Neg Hx        Social History     Socioeconomic History    Marital status:    Tobacco Use    Smoking status: Former     Packs/day: 1.50     Years: 21.00     Pack years: 31.50     Types: Cigarettes     Quit date: 1984     Years since quittin.2    Smokeless tobacco: Never   Substance and Sexual Activity    Alcohol use: Yes     Comment: occ    Drug use: No    Sexual activity: Yes     Partners: Male       Chief Complaint:   Chief Complaint   Patient presents with    Right Wrist - Follow-up     DOI: 2023 - 11 days s/p Right Wrist FX. Patient reports 2/10 pain depending on ROM of wrist/arm.          History of present illness:    This is a 68 y.o. year old female who complains of patient is status post distal radius fracture of the right wrist her date of injury was date of injury was 2023 she is now 11 days post injury    Review of Systems:    Constitution: Denies chills, fever, and sweats.  HENT: Denies headaches or blurry vision.  Cardiovascular: Denies chest pain or irregular heart beat.  Respiratory: Denies cough or shortness of breath.  Gastrointestinal: Denies abdominal pain, nausea, or vomiting.  Musculoskeletal:  Denies muscle cramps.  Neurological: Denies dizziness or focal weakness.  Psychiatric/Behavioral: Normal mental status.  Hematologic/Lymphatic: Denies bleeding problem or easy bruising/bleeding.  Skin: Denies rash or suspicious lesions.    Examination:    Vital Signs:    Vitals:    23 0937   Weight: 79.8 kg (175 lb 14.8 oz)   Height: 5' 4" (1.626 m)   PainSc:   2   PainLoc: Wrist       Body mass index is 30.2 kg/m².    This a well-developed, well nourished patient in no acute distress.    Alert and oriented x 3 and cooperative to examination.       Physical Exam:  Right wrist-patient has tenderness at the fracture site she is neurovascularly intact in the fingers    Imaging:  Her x-ray " today shows some loss of volar tilt and some shortening and radial shortening and displacement and comminution       Assessment: Closed Colles' fracture of right radius, initial encounter        Plan:  I would recommend a volar plating of this fracture the patient has had a volar plate on the other wrist in the past has done well and she wishes to proceed with the volar plating.  We will schedule her for this Monday.      DISCLAIMER: This note may have been dictated using voice recognition software and may contain grammatical errors.     NOTE: Consult report sent to referring provider via [email or web address] EMR.

## 2023-06-24 ENCOUNTER — ANESTHESIA EVENT (OUTPATIENT)
Dept: SURGERY | Facility: HOSPITAL | Age: 68
End: 2023-06-24
Payer: COMMERCIAL

## 2023-06-26 ENCOUNTER — HOSPITAL ENCOUNTER (OUTPATIENT)
Facility: HOSPITAL | Age: 68
Discharge: HOME | End: 2023-06-26
Attending: ORTHOPAEDIC SURGERY | Admitting: ORTHOPAEDIC SURGERY
Payer: COMMERCIAL

## 2023-06-26 ENCOUNTER — ANESTHESIA (OUTPATIENT)
Dept: SURGERY | Facility: HOSPITAL | Age: 68
End: 2023-06-26
Payer: COMMERCIAL

## 2023-06-26 DIAGNOSIS — S52.531A CLOSED COLLES' FRACTURE OF RIGHT RADIUS, INITIAL ENCOUNTER: Primary | ICD-10-CM

## 2023-06-26 DIAGNOSIS — S52.531A CLOSED COLLES' FRACTURE OF RIGHT RADIUS, INITIAL ENCOUNTER: ICD-10-CM

## 2023-06-26 PROCEDURE — 25000003 PHARM REV CODE 250: Performed by: NURSE ANESTHETIST, CERTIFIED REGISTERED

## 2023-06-26 PROCEDURE — 27201423 OPTIME MED/SURG SUP & DEVICES STERILE SUPPLY: Performed by: ORTHOPAEDIC SURGERY

## 2023-06-26 PROCEDURE — D9220A PRA ANESTHESIA: Mod: CRNA,,, | Performed by: NURSE ANESTHETIST, CERTIFIED REGISTERED

## 2023-06-26 PROCEDURE — 63600175 PHARM REV CODE 636 W HCPCS: Performed by: ANESTHESIOLOGY

## 2023-06-26 PROCEDURE — 36000709 HC OR TIME LEV III EA ADD 15 MIN: Performed by: ORTHOPAEDIC SURGERY

## 2023-06-26 PROCEDURE — 71000039 HC RECOVERY, EACH ADD'L HOUR: Performed by: ORTHOPAEDIC SURGERY

## 2023-06-26 PROCEDURE — 94799 UNLISTED PULMONARY SVC/PX: CPT

## 2023-06-26 PROCEDURE — 99900104 DSU ONLY-NO CHARGE-EA ADD'L HR (STAT): Performed by: ORTHOPAEDIC SURGERY

## 2023-06-26 PROCEDURE — 71000015 HC POSTOP RECOV 1ST HR: Performed by: ORTHOPAEDIC SURGERY

## 2023-06-26 PROCEDURE — 71000033 HC RECOVERY, INTIAL HOUR: Performed by: ORTHOPAEDIC SURGERY

## 2023-06-26 PROCEDURE — 37000009 HC ANESTHESIA EA ADD 15 MINS: Performed by: ORTHOPAEDIC SURGERY

## 2023-06-26 PROCEDURE — D9220A PRA ANESTHESIA: ICD-10-PCS | Mod: CRNA,,, | Performed by: NURSE ANESTHETIST, CERTIFIED REGISTERED

## 2023-06-26 PROCEDURE — 63600175 PHARM REV CODE 636 W HCPCS: Performed by: NURSE ANESTHETIST, CERTIFIED REGISTERED

## 2023-06-26 PROCEDURE — C1713 ANCHOR/SCREW BN/BN,TIS/BN: HCPCS | Performed by: ORTHOPAEDIC SURGERY

## 2023-06-26 PROCEDURE — 25000003 PHARM REV CODE 250: Performed by: ANESTHESIOLOGY

## 2023-06-26 PROCEDURE — 99900103 DSU ONLY-NO CHARGE-INITIAL HR (STAT): Performed by: ORTHOPAEDIC SURGERY

## 2023-06-26 PROCEDURE — D9220A PRA ANESTHESIA: ICD-10-PCS | Mod: ANES,,, | Performed by: ANESTHESIOLOGY

## 2023-06-26 PROCEDURE — 37000008 HC ANESTHESIA 1ST 15 MINUTES: Performed by: ORTHOPAEDIC SURGERY

## 2023-06-26 PROCEDURE — 25607 PR OPEN RX DISTAL RADIUS FX, EXTRA-ARTICULAR: ICD-10-PCS | Mod: RT,,, | Performed by: ORTHOPAEDIC SURGERY

## 2023-06-26 PROCEDURE — D9220A PRA ANESTHESIA: Mod: ANES,,, | Performed by: ANESTHESIOLOGY

## 2023-06-26 PROCEDURE — 25607 OPTX DST RD XARTC FX/EPI SEP: CPT | Mod: RT,,, | Performed by: ORTHOPAEDIC SURGERY

## 2023-06-26 PROCEDURE — 27200651 HC AIRWAY, LMA: Performed by: ANESTHESIOLOGY

## 2023-06-26 PROCEDURE — 63600175 PHARM REV CODE 636 W HCPCS: Performed by: ORTHOPAEDIC SURGERY

## 2023-06-26 PROCEDURE — 36000708 HC OR TIME LEV III 1ST 15 MIN: Performed by: ORTHOPAEDIC SURGERY

## 2023-06-26 DEVICE — IMPLANTABLE DEVICE: Type: IMPLANTABLE DEVICE | Site: WRIST | Status: FUNCTIONAL

## 2023-06-26 DEVICE — SCREW LOCKING 2.4 X 18MM: Type: IMPLANTABLE DEVICE | Site: WRIST | Status: FUNCTIONAL

## 2023-06-26 DEVICE — SCREW STRDRV REC T8 2.4X22 SS: Type: IMPLANTABLE DEVICE | Site: WRIST | Status: FUNCTIONAL

## 2023-06-26 DEVICE — SCREW STRDRV REC T8 2.7X14 SS: Type: IMPLANTABLE DEVICE | Site: WRIST | Status: FUNCTIONAL

## 2023-06-26 RX ORDER — ONDANSETRON 2 MG/ML
4 INJECTION INTRAMUSCULAR; INTRAVENOUS ONCE
Status: DISCONTINUED | OUTPATIENT
Start: 2023-06-26 | End: 2023-06-26 | Stop reason: HOSPADM

## 2023-06-26 RX ORDER — SODIUM CHLORIDE, SODIUM LACTATE, POTASSIUM CHLORIDE, CALCIUM CHLORIDE 600; 310; 30; 20 MG/100ML; MG/100ML; MG/100ML; MG/100ML
INJECTION, SOLUTION INTRAVENOUS CONTINUOUS
Status: DISCONTINUED | OUTPATIENT
Start: 2023-06-26 | End: 2023-06-26 | Stop reason: HOSPADM

## 2023-06-26 RX ORDER — HYDROMORPHONE HYDROCHLORIDE 2 MG/ML
0.2 INJECTION, SOLUTION INTRAMUSCULAR; INTRAVENOUS; SUBCUTANEOUS EVERY 5 MIN PRN
Status: DISCONTINUED | OUTPATIENT
Start: 2023-06-26 | End: 2023-06-26 | Stop reason: HOSPADM

## 2023-06-26 RX ORDER — CEFAZOLIN SODIUM 2 G/50ML
2 SOLUTION INTRAVENOUS
Status: COMPLETED | OUTPATIENT
Start: 2023-06-26 | End: 2023-06-26

## 2023-06-26 RX ORDER — ONDANSETRON 2 MG/ML
4 INJECTION INTRAMUSCULAR; INTRAVENOUS EVERY 12 HOURS PRN
Status: DISCONTINUED | OUTPATIENT
Start: 2023-06-26 | End: 2023-06-26 | Stop reason: HOSPADM

## 2023-06-26 RX ORDER — DIPHENHYDRAMINE HYDROCHLORIDE 50 MG/ML
12.5 INJECTION INTRAMUSCULAR; INTRAVENOUS EVERY 6 HOURS PRN
Status: DISCONTINUED | OUTPATIENT
Start: 2023-06-26 | End: 2023-06-26 | Stop reason: HOSPADM

## 2023-06-26 RX ORDER — LIDOCAINE HYDROCHLORIDE 20 MG/ML
INJECTION INTRAVENOUS
Status: DISCONTINUED | OUTPATIENT
Start: 2023-06-26 | End: 2023-06-26

## 2023-06-26 RX ORDER — PHENYLEPHRINE HYDROCHLORIDE 10 MG/ML
INJECTION INTRAVENOUS
Status: DISCONTINUED | OUTPATIENT
Start: 2023-06-26 | End: 2023-06-26

## 2023-06-26 RX ORDER — OXYCODONE HYDROCHLORIDE 5 MG/1
5 TABLET ORAL
Status: DISCONTINUED | OUTPATIENT
Start: 2023-06-26 | End: 2023-06-26 | Stop reason: HOSPADM

## 2023-06-26 RX ORDER — LABETALOL HYDROCHLORIDE 5 MG/ML
10 INJECTION, SOLUTION INTRAVENOUS ONCE
Status: COMPLETED | OUTPATIENT
Start: 2023-06-26 | End: 2023-06-26

## 2023-06-26 RX ORDER — LIDOCAINE HYDROCHLORIDE 10 MG/ML
1 INJECTION, SOLUTION EPIDURAL; INFILTRATION; INTRACAUDAL; PERINEURAL ONCE
Status: DISCONTINUED | OUTPATIENT
Start: 2023-06-26 | End: 2023-06-26 | Stop reason: HOSPADM

## 2023-06-26 RX ORDER — OXYCODONE AND ACETAMINOPHEN 10; 325 MG/1; MG/1
1 TABLET ORAL EVERY 4 HOURS PRN
Qty: 30 TABLET | Refills: 0 | Status: SHIPPED | OUTPATIENT
Start: 2023-06-26 | End: 2023-12-26

## 2023-06-26 RX ORDER — PROPOFOL 10 MG/ML
VIAL (ML) INTRAVENOUS
Status: DISCONTINUED | OUTPATIENT
Start: 2023-06-26 | End: 2023-06-26

## 2023-06-26 RX ORDER — ONDANSETRON 4 MG/1
4 TABLET, ORALLY DISINTEGRATING ORAL EVERY 6 HOURS PRN
Qty: 20 TABLET | Refills: 1 | Status: SHIPPED | OUTPATIENT
Start: 2023-06-26 | End: 2023-12-26

## 2023-06-26 RX ORDER — SODIUM CHLORIDE 0.9 % (FLUSH) 0.9 %
10 SYRINGE (ML) INJECTION
Status: DISCONTINUED | OUTPATIENT
Start: 2023-06-26 | End: 2023-06-26 | Stop reason: HOSPADM

## 2023-06-26 RX ORDER — FENTANYL CITRATE 50 UG/ML
INJECTION, SOLUTION INTRAMUSCULAR; INTRAVENOUS
Status: DISCONTINUED | OUTPATIENT
Start: 2023-06-26 | End: 2023-06-26

## 2023-06-26 RX ORDER — MEPERIDINE HYDROCHLORIDE 50 MG/ML
12.5 INJECTION INTRAMUSCULAR; INTRAVENOUS; SUBCUTANEOUS ONCE
Status: DISCONTINUED | OUTPATIENT
Start: 2023-06-26 | End: 2023-06-26 | Stop reason: HOSPADM

## 2023-06-26 RX ORDER — FENTANYL CITRATE 50 UG/ML
25 INJECTION, SOLUTION INTRAMUSCULAR; INTRAVENOUS EVERY 5 MIN PRN
Status: DISCONTINUED | OUTPATIENT
Start: 2023-06-26 | End: 2023-06-26 | Stop reason: HOSPADM

## 2023-06-26 RX ORDER — ONDANSETRON HYDROCHLORIDE 2 MG/ML
INJECTION, SOLUTION INTRAMUSCULAR; INTRAVENOUS
Status: DISCONTINUED | OUTPATIENT
Start: 2023-06-26 | End: 2023-06-26

## 2023-06-26 RX ORDER — DEXAMETHASONE SODIUM PHOSPHATE 4 MG/ML
INJECTION, SOLUTION INTRA-ARTICULAR; INTRALESIONAL; INTRAMUSCULAR; INTRAVENOUS; SOFT TISSUE
Status: DISCONTINUED | OUTPATIENT
Start: 2023-06-26 | End: 2023-06-26

## 2023-06-26 RX ORDER — FENTANYL CITRATE 50 UG/ML
25-200 INJECTION, SOLUTION INTRAMUSCULAR; INTRAVENOUS
Status: DISCONTINUED | OUTPATIENT
Start: 2023-06-26 | End: 2023-06-26 | Stop reason: HOSPADM

## 2023-06-26 RX ORDER — MIDAZOLAM HYDROCHLORIDE 1 MG/ML
.5-4 INJECTION INTRAMUSCULAR; INTRAVENOUS
Status: DISCONTINUED | OUTPATIENT
Start: 2023-06-26 | End: 2023-06-26 | Stop reason: HOSPADM

## 2023-06-26 RX ORDER — ACETAMINOPHEN 10 MG/ML
INJECTION, SOLUTION INTRAVENOUS
Status: DISCONTINUED | OUTPATIENT
Start: 2023-06-26 | End: 2023-06-26

## 2023-06-26 RX ADMIN — FENTANYL CITRATE 25 MCG: 50 INJECTION, SOLUTION INTRAMUSCULAR; INTRAVENOUS at 12:06

## 2023-06-26 RX ADMIN — OXYCODONE HYDROCHLORIDE 5 MG: 5 TABLET ORAL at 12:06

## 2023-06-26 RX ADMIN — ONDANSETRON 4 MG: 2 INJECTION INTRAMUSCULAR; INTRAVENOUS at 10:06

## 2023-06-26 RX ADMIN — SODIUM CHLORIDE, SODIUM GLUCONATE, SODIUM ACETATE, POTASSIUM CHLORIDE, MAGNESIUM CHLORIDE, SODIUM PHOSPHATE, DIBASIC, AND POTASSIUM PHOSPHATE: .53; .5; .37; .037; .03; .012; .00082 INJECTION, SOLUTION INTRAVENOUS at 09:06

## 2023-06-26 RX ADMIN — FENTANYL CITRATE 50 MCG: 50 INJECTION, SOLUTION INTRAMUSCULAR; INTRAVENOUS at 11:06

## 2023-06-26 RX ADMIN — CEFAZOLIN SODIUM 2 G: 2 SOLUTION INTRAVENOUS at 10:06

## 2023-06-26 RX ADMIN — ACETAMINOPHEN 1000 MG: 10 INJECTION, SOLUTION INTRAVENOUS at 10:06

## 2023-06-26 RX ADMIN — DEXAMETHASONE SODIUM PHOSPHATE 4 MG: 4 INJECTION, SOLUTION INTRA-ARTICULAR; INTRALESIONAL; INTRAMUSCULAR; INTRAVENOUS; SOFT TISSUE at 10:06

## 2023-06-26 RX ADMIN — FENTANYL CITRATE 100 MCG: 50 INJECTION, SOLUTION INTRAMUSCULAR; INTRAVENOUS at 10:06

## 2023-06-26 RX ADMIN — PHENYLEPHRINE HYDROCHLORIDE 200 MCG: 10 INJECTION INTRAVENOUS at 11:06

## 2023-06-26 RX ADMIN — PROPOFOL 150 MG: 10 INJECTION, EMULSION INTRAVENOUS at 10:06

## 2023-06-26 RX ADMIN — LABETALOL HYDROCHLORIDE 10 MG: 5 INJECTION INTRAVENOUS at 12:06

## 2023-06-26 RX ADMIN — LIDOCAINE HYDROCHLORIDE 100 MG: 20 INJECTION, SOLUTION INTRAVENOUS at 10:06

## 2023-06-26 NOTE — DISCHARGE SUMMARY
[facility]  Discharge Note  Short Stay    Procedure(s) (LRB):  ORIF, WRIST (Right)      OUTCOME: Patient tolerated treatment/procedure well without complication and is now ready for discharge.    DISPOSITION: Home or Self Care    FINAL DIAGNOSIS:  <principal problem not specified>    FOLLOWUP: In clinic the patient is keep the dressing on at all times elevate the hand she was given Percocet 10 mg q.4 hours as needed for pain and Zofran 4 mg q.6h as needed for nausea patient should keep her arm in a sling and elevated and follow up in our office in 1 week.    DISCHARGE INSTRUCTIONS:    Discharge Procedure Orders   SLING ORTHOPEDIC MEDIUM FOR HOME USE     Diet general     Call MD for:  temperature >100.4     Call MD for:  persistent nausea and vomiting     Call MD for:  severe uncontrolled pain     Call MD for:  difficulty breathing, headache or visual disturbances     Call MD for:  redness, tenderness, or signs of infection (pain, swelling, redness, odor or green/yellow discharge around incision site)     Call MD for:  hives     Call MD for:  persistent dizziness or light-headedness     Call MD for:  extreme fatigue     Keep surgical extremity elevated     Leave dressing on - Keep it clean, dry, and intact until clinic visit        TIME SPENT ON DISCHARGE:  20 minutes

## 2023-06-26 NOTE — ANESTHESIA PROCEDURE NOTES
Intubation    Date/Time: 6/26/2023 10:41 AM  Performed by: Tyson Ferreira CRNA  Authorized by: Mark Mcneal MD     Intubation:     Induction:  Intravenous    Intubated:  Postinduction    Mask Ventilation:  Not attempted    Attempts:  1    Attempted By:  CRNA    Difficult Airway Encountered?: No      Complications:  None    Airway Device:  Supraglottic airway/LMA    Airway Device Size:  3.0    Style/Cuff Inflation:  Cuffed    Secured at:  The lips    Placement Verified By:  Capnometry    Complicating Factors:  None    Findings Post-Intubation:  BS equal bilateral

## 2023-06-26 NOTE — PLAN OF CARE
Patient prepared for procedure.  No questions at this time.  Belongings placed in preop cabinet.

## 2023-06-26 NOTE — PLAN OF CARE
1335Assumed care, vss, see assess. Nadn. Will monitor.    1430 Pt tolerating po fluids, pain controlled. All belongings returned to pt. D/c instructions given and explained to pt, pt v/u. D/c'd out to pov via wc per staff with nadn and daughter at side.

## 2023-06-26 NOTE — ANESTHESIA PREPROCEDURE EVALUATION
06/26/2023  Gaby Donohue is a 68 y.o., female.      Pre-op Assessment    I have reviewed the Patient Summary Reports.     I have reviewed the Nursing Notes. I have reviewed the NPO Status.   I have reviewed the Medications.     Review of Systems  Anesthesia Hx:  Denies Family Hx of Anesthesia complications.   Denies Personal Hx of Anesthesia complications.   Cardiovascular:   ECG has been reviewed.    Hepatic/GI:   GERD, well controlled    Endocrine:  Obesity / BMI > 30  Psych:   Psychiatric History          Physical Exam  General: Cooperative, Alert and Oriented    Airway:  Mallampati: II   Mouth Opening: Normal  TM Distance: Normal  Tongue: Normal  Neck ROM: Normal ROM        Anesthesia Plan  Type of Anesthesia, risks & benefits discussed:    Anesthesia Type: Gen Supraglottic Airway  Intra-op Monitoring Plan: Standard ASA Monitors  Post Op Pain Control Plan: multimodal analgesia  Induction:  IV  Airway Plan: , Post-Induction  Informed Consent: Informed consent signed with the Patient and all parties understand the risks and agree with anesthesia plan.  All questions answered.   ASA Score: 2    Ready For Surgery From Anesthesia Perspective.     .

## 2023-06-26 NOTE — ANESTHESIA POSTPROCEDURE EVALUATION
Anesthesia Post Evaluation    Patient: Gaby Donohue    Procedure(s) Performed: Procedure(s) (LRB):  ORIF, WRIST (Right)    Final Anesthesia Type: general      Patient location during evaluation: PACU  Patient participation: Yes- Able to Participate  Level of consciousness: awake and alert  Post-procedure vital signs: reviewed and stable  Pain management: adequate  Airway patency: patent    PONV status at discharge: No PONV  Anesthetic complications: no      Cardiovascular status: blood pressure returned to baseline  Respiratory status: unassisted  Hydration status: euvolemic  Follow-up not needed.          Vitals Value Taken Time   /72 06/26/23 1355   Temp 36.6 °C (97.8 °F) 06/26/23 1355   Pulse 78 06/26/23 1355   Resp 18 06/26/23 1355   SpO2 100 % 06/26/23 1355         Event Time   Out of Recovery 13:35:00         Pain/Tariq Score: Pain Rating Prior to Med Admin: 8 (6/26/2023 12:50 PM)  Pain Rating Post Med Admin: 6 (6/26/2023 12:40 PM)  Tariq Score: 10 (6/26/2023  1:55 PM)

## 2023-06-26 NOTE — PLAN OF CARE
Pt's BP remains elevated.  Dr. Mcneal notified- new order rec'd and carried out.  Will continue to monitor

## 2023-06-26 NOTE — OP NOTE
[facility]  Orthopedic Surgery   Operative Note    SUMMARY     Date of Procedure: 6/26/2023     Procedure: Procedure(s) (LRB):  ORIF, WRIST (Right)       Surgeon(s) and Role:     * Shivam Canela MD - Primary    Assistant:  None    Pre-Operative Diagnosis: Closed Colles' fracture of right radius, initial encounter [S52.531A]    Post-Operative Diagnosis: Post-Op Diagnosis Codes:     * Closed Colles' fracture of right radius, initial encounter [S52.531A]    Anesthesia: General      Estimated Blood Loss (EBL): * No values recorded between 6/26/2023 10:53 AM and 6/26/2023 11:45 AM *           Implants:   Implant Name Type Inv. Item Serial No.  Lot No. LRB No. Used Action   LOG [id number] - [employer] 3.5 CANNULATED SCREW SET - 1           LOG [id number] - [employer] SMALL FRAGMENT - 1           PLATE VA-LCP 6H SS STRL R 51MM - [id number]  PLATE VA-LCP 6H SS STRL R 51MM  SYNTHES  Right 1 Implanted       Specimens:   Specimen (24h ago, onward)      None             Complications:  None           Description of the Procedure:  The patient was anesthetic and placed in a supine position she was given IV prior to incision.  A tourniquet was placed on right upper arm and the tourniquet placed to 250 mmHg the distal radius fracture was manipulated with distal traction and volar pressure to reduce the angulation we are able to get it out to almost anatomical position the limb was the arm was exsanguinated and the tourniquet placed 250 mmHg.  A longitudinal incision was made over the flexor carpi radialis tendon the neurovascular structures protected the pronator muscle was retracted off of the 2 volar surface of the distal radius the fracture site was identified it was reduced anatomically then a 3 hole plate volar plate was placed on the volar surface screws were placed into the site placed into the distal distally and proximally x-rays under AP lateral and x-rays of the distal radius showed good  placement of the hardware good reduction of the fracture the wound was irrigated with normal saline solution hemostasis was secured with the Bovie the subQ was closed with a continuous 3-0 Vicryl stitch and then a 4-0 nylon placed in the skin a soft dressing was applied and then the extremity was placed in volar plaster with the wrist in neutral position the patient was stable to recovery.

## 2023-06-27 VITALS
HEIGHT: 64 IN | BODY MASS INDEX: 30.04 KG/M2 | SYSTOLIC BLOOD PRESSURE: 153 MMHG | TEMPERATURE: 98 F | WEIGHT: 175.94 LBS | OXYGEN SATURATION: 100 % | RESPIRATION RATE: 18 BRPM | HEART RATE: 78 BPM | DIASTOLIC BLOOD PRESSURE: 72 MMHG

## 2023-06-27 NOTE — PROGRESS NOTES
Very pleased with care given.  All staff were kind and professional she stated. Understands all discharge instructions.

## 2023-06-29 ENCOUNTER — TELEPHONE (OUTPATIENT)
Dept: ORTHOPEDICS | Facility: CLINIC | Age: 68
End: 2023-06-29
Payer: COMMERCIAL

## 2023-06-29 NOTE — TELEPHONE ENCOUNTER
----- Message from Tracy Gomez sent at 6/29/2023  1:53 PM CDT -----  Contact: patient  Type:  Needs Medical Advice    Who Called: patient     Would the patient rather a call back or a response via [facility]? Call     Best Call Back Number: [phone] (home)      Additional Information: Patient would like to speak with the nurse in regards to questions about the color of her finger.     Please call to advise

## 2023-06-30 DIAGNOSIS — F32.5 MAJOR DEPRESSIVE DISORDER WITH SINGLE EPISODE, IN FULL REMISSION: ICD-10-CM

## 2023-06-30 RX ORDER — DULOXETIN HYDROCHLORIDE 30 MG/1
CAPSULE, DELAYED RELEASE ORAL
Qty: 90 CAPSULE | Refills: 0 | Status: SHIPPED | OUTPATIENT
Start: 2023-06-30 | End: 2023-08-08 | Stop reason: SDUPTHER

## 2023-06-30 NOTE — TELEPHONE ENCOUNTER
Requesting refill for  Cymbalta     Last visit 9/13/2022  Next visit 10/26/2023        ----- Message from Mariel Valdez sent at 6/30/2023  3:01 PM CDT -----  Contact: Patient  Type:  RX Refill Request    Who Called:  Patient  Refill or New Rx:  Refill (her other Prilosec Rx was refilled but this one was not, does she need sue seen)  RX Name and Strength:  DULoxetine (CYMBALTA) 30 MG capsule   How is the patient currently taking it? (ex. 1XDay):  TAKE THREE CAPSULES (90MG) BY MOUTH ONCE DAILY   Is this a 30 day or 90 day RX:  90 capsule 2   Preferred Pharmacy with phone number:    [id number] - [place], [place] - [address] [address]  Phone: [phone] Fax: [phone]  Local or Mail Order:  Local  Ordering Provider:  Danika rBantley Call Back Number:  [phone]  Additional Information:  Please call the pt back to advise. Thanks!

## 2023-06-30 NOTE — TELEPHONE ENCOUNTER
No care due was identified.  [facility] Embedded Care Due Messages. Reference number: [id number].   6/30/2023 3:22:27 PM CDT

## 2023-07-03 ENCOUNTER — TELEPHONE (OUTPATIENT)
Dept: ORTHOPEDICS | Facility: CLINIC | Age: 68
End: 2023-07-03
Payer: COMMERCIAL

## 2023-07-03 NOTE — TELEPHONE ENCOUNTER
Returned call. Pt confirmed 07/11/2023 appt. Informed work status will be determined at her first post op appt. She will reach out to us if her employer gives her any issues with having to push back her post op appt.

## 2023-07-03 NOTE — TELEPHONE ENCOUNTER
----- Message from Archana Frias MA sent at 7/3/2023  2:31 PM CDT -----  Contact: pt  Work release questions   Call back

## 2023-07-07 ENCOUNTER — HOSPITAL ENCOUNTER (OUTPATIENT)
Dept: RADIOLOGY | Facility: HOSPITAL | Age: 68
Discharge: HOME | End: 2023-07-07
Attending: ORTHOPAEDIC SURGERY
Payer: COMMERCIAL

## 2023-07-07 ENCOUNTER — OFFICE VISIT (OUTPATIENT)
Dept: ORTHOPEDICS | Facility: CLINIC | Age: 68
End: 2023-07-07
Payer: COMMERCIAL

## 2023-07-07 VITALS — HEIGHT: 64 IN | BODY MASS INDEX: 30.04 KG/M2 | WEIGHT: 175.94 LBS

## 2023-07-07 DIAGNOSIS — S52.531A CLOSED COLLES' FRACTURE OF RIGHT RADIUS, INITIAL ENCOUNTER: ICD-10-CM

## 2023-07-07 DIAGNOSIS — S52.531D CLOSED COLLES' FRACTURE OF RIGHT RADIUS WITH ROUTINE HEALING: Primary | ICD-10-CM

## 2023-07-07 PROCEDURE — 3288F FALL RISK ASSESSMENT DOCD: CPT | Mod: CPTII,S$GLB,, | Performed by: ORTHOPAEDIC SURGERY

## 2023-07-07 PROCEDURE — 73110 XR WRIST COMPLETE 3 VIEWS RIGHT: ICD-10-PCS | Mod: 26,RT,, | Performed by: RADIOLOGY

## 2023-07-07 PROCEDURE — 1160F RVW MEDS BY RX/DR IN RCRD: CPT | Mod: CPTII,S$GLB,, | Performed by: ORTHOPAEDIC SURGERY

## 2023-07-07 PROCEDURE — 1159F PR MEDICATION LIST DOCUMENTED IN MEDICAL RECORD: ICD-10-PCS | Mod: CPTII,S$GLB,, | Performed by: ORTHOPAEDIC SURGERY

## 2023-07-07 PROCEDURE — 3288F PR FALLS RISK ASSESSMENT DOCUMENTED: ICD-10-PCS | Mod: CPTII,S$GLB,, | Performed by: ORTHOPAEDIC SURGERY

## 2023-07-07 PROCEDURE — 1125F AMNT PAIN NOTED PAIN PRSNT: CPT | Mod: CPTII,S$GLB,, | Performed by: ORTHOPAEDIC SURGERY

## 2023-07-07 PROCEDURE — 1159F MED LIST DOCD IN RCRD: CPT | Mod: CPTII,S$GLB,, | Performed by: ORTHOPAEDIC SURGERY

## 2023-07-07 PROCEDURE — 99999 PR PBB SHADOW E&M-EST. PATIENT-LVL III: ICD-10-PCS | Mod: PBBFAC,,, | Performed by: ORTHOPAEDIC SURGERY

## 2023-07-07 PROCEDURE — 73110 X-RAY EXAM OF WRIST: CPT | Mod: TC,PO,RT

## 2023-07-07 PROCEDURE — 3008F BODY MASS INDEX DOCD: CPT | Mod: CPTII,S$GLB,, | Performed by: ORTHOPAEDIC SURGERY

## 2023-07-07 PROCEDURE — 99024 PR POST-OP FOLLOW-UP VISIT: ICD-10-PCS | Mod: S$GLB,,, | Performed by: ORTHOPAEDIC SURGERY

## 2023-07-07 PROCEDURE — 99024 POSTOP FOLLOW-UP VISIT: CPT | Mod: S$GLB,,, | Performed by: ORTHOPAEDIC SURGERY

## 2023-07-07 PROCEDURE — 3008F PR BODY MASS INDEX (BMI) DOCUMENTED: ICD-10-PCS | Mod: CPTII,S$GLB,, | Performed by: ORTHOPAEDIC SURGERY

## 2023-07-07 PROCEDURE — 1160F PR REVIEW ALL MEDS BY PRESCRIBER/CLIN PHARMACIST DOCUMENTED: ICD-10-PCS | Mod: CPTII,S$GLB,, | Performed by: ORTHOPAEDIC SURGERY

## 2023-07-07 PROCEDURE — 73110 X-RAY EXAM OF WRIST: CPT | Mod: 26,RT,, | Performed by: RADIOLOGY

## 2023-07-07 PROCEDURE — 1101F PT FALLS ASSESS-DOCD LE1/YR: CPT | Mod: CPTII,S$GLB,, | Performed by: ORTHOPAEDIC SURGERY

## 2023-07-07 PROCEDURE — 1101F PR PT FALLS ASSESS DOC 0-1 FALLS W/OUT INJ PAST YR: ICD-10-PCS | Mod: CPTII,S$GLB,, | Performed by: ORTHOPAEDIC SURGERY

## 2023-07-07 PROCEDURE — 1125F PR PAIN SEVERITY QUANTIFIED, PAIN PRESENT: ICD-10-PCS | Mod: CPTII,S$GLB,, | Performed by: ORTHOPAEDIC SURGERY

## 2023-07-07 PROCEDURE — 99999 PR PBB SHADOW E&M-EST. PATIENT-LVL III: CPT | Mod: PBBFAC,,, | Performed by: ORTHOPAEDIC SURGERY

## 2023-07-07 NOTE — PROGRESS NOTES
Subjective:      Patient ID: Gaby Donohue is a 68 y.o. female.    Chief Complaint: Post-op Evaluation of the Right Wrist    HPI  Patient follow up status post ORIF of right wrist fracture complaining of some mild residual pain  ROS      Objective:    Ortho Exam     Incision is healing nicely without any erythema or drainage  Moves fingers without pain brisk capillary refill of her digits    SURG FL Surgery Fluoro Usage  See OP Notes for results.     IMPRESSION: See OP Notes for results.     This procedure was auto-finalized by: Virtual Radiologist       My Radiographs Findings:    Repeat radiographs show adequate alignment and hardware positioning  Assessment:       Encounter Diagnosis   Name Primary?    Closed Colles' fracture of right radius with routine healing Yes         Plan:       I have discussed medical condition treatment options with her at length including local wound care activity restrictions finger range of motion exercises bracing and follow up in 4 weeks I will see her sooner if any questions or problems.        Past Medical History:   Diagnosis Date    Anxiety     Basal cell carcinoma     scalp and lip    Cancer     skin ca on face    Depression     GERD (gastroesophageal reflux disease)     Hyperlipidemia     Wears glasses      Past Surgical History:   Procedure Laterality Date    CHOLECYSTECTOMY      COLONOSCOPY  04/20/2012    Dr. Jaime, 5 year recheck    COLONOSCOPY N/A 06/09/2021    Procedure: COLONOSCOPY;  Surgeon: Yasmeen Goldman MD;  Location: [facility];  Service: Endoscopy;  Laterality: N/A;    ESOPHAGOGASTRODUODENOSCOPY  08/17/2012    Dr. Jaime; gastritis; bx unremarkable    ESOPHAGOGASTRODUODENOSCOPY N/A 06/09/2021    Procedure: EGD (ESOPHAGOGASTRODUODENOSCOPY);  Surgeon: Yasmeen Goldman MD;  Location: [facility] ENDO;  Service: Endoscopy;  Laterality: N/A;    FRACTURE SURGERY  11/02/2013    left arm Dr Cooper    laporoscopy      OPEN REDUCTION AND INTERNAL FIXATION (ORIF) OF  INJURY OF WRIST Right 2023    Procedure: ORIF, WRIST;  Surgeon: Shivam Canela MD;  Location: [facility];  Service: Orthopedics;  Laterality: Right;    UPPER GASTROINTESTINAL ENDOSCOPY           Current Outpatient Medications:     ALPRAZolam (XANAX) 0.25 MG tablet, Take one up to three times daily 30 minutes before takeoff for anxiety with flying, Disp: 20 tablet, Rfl: 0    DULoxetine (CYMBALTA) 30 MG capsule, TAKE THREE CAPSULES (90MG) BY MOUTH ONCE DAILY, Disp: 90 capsule, Rfl: 0    ibuprofen (ADVIL,MOTRIN) 600 MG tablet, Take 600 mg by mouth every 6 (six) hours as needed., Disp: , Rfl:     omeprazole (PRILOSEC) 40 MG capsule, Take 1 capsule (40 mg total) by mouth every morning., Disp: 90 capsule, Rfl: 0    ondansetron (ZOFRAN-ODT) 4 MG TbDL, Take 1 tablet (4 mg total) by mouth every 6 (six) hours as needed., Disp: 20 tablet, Rfl: 1    oxyCODONE-acetaminophen (PERCOCET)  mg per tablet, Take 1 tablet by mouth every 4 (four) hours as needed for Pain., Disp: 30 tablet, Rfl: 0    Review of patient's allergies indicates:   Allergen Reactions    No known drug allergies        Family History   Problem Relation Age of Onset    COPD Mother     Emphysema Mother     Heart disease Father     Cancer Sister         skin cancer face    Cancer Sister         skin cancer face    Cancer Sister         skin cancer face     Cancer Brother         skin cancer face     No Known Problems Brother     No Known Problems Daughter     No Known Problems Daughter     Colon cancer Neg Hx     Colon polyps Neg Hx     Esophageal cancer Neg Hx     Stomach cancer Neg Hx      Social History     Occupational History    Not on file   Tobacco Use    Smoking status: Former     Packs/day: 1.50     Years: 21.00     Pack years: 31.50     Types: Cigarettes     Quit date: 1984     Years since quittin.2    Smokeless tobacco: Never   Substance and Sexual Activity    Alcohol use: Yes     Comment: occ    Drug use: No    Sexual activity:  Yes     Partners: Male

## 2023-07-11 ENCOUNTER — TELEPHONE (OUTPATIENT)
Dept: ORTHOPEDICS | Facility: CLINIC | Age: 68
End: 2023-07-11
Payer: COMMERCIAL

## 2023-07-11 NOTE — TELEPHONE ENCOUNTER
Patient informed to remove coban, and leave steri strips in place until they fall off.  She was given removable brace and I informed her to keep the brace on and we will see her on 07/28/2023.

## 2023-07-11 NOTE — TELEPHONE ENCOUNTER
----- Message from Ron Ratliff MA sent at 7/11/2023  8:52 AM CDT -----  Contact: patient  Patient wants to know if she can take off her bandage?    Call back number is [phone]

## 2023-07-17 ENCOUNTER — TELEPHONE (OUTPATIENT)
Dept: ORTHOPEDICS | Facility: CLINIC | Age: 68
End: 2023-07-17
Payer: COMMERCIAL

## 2023-07-17 NOTE — TELEPHONE ENCOUNTER
----- Message from Thom Hill sent at 7/17/2023 11:43 AM CDT -----  Regarding: medical question call pt   Contact: pt   medical question call pt

## 2023-07-18 ENCOUNTER — TELEPHONE (OUTPATIENT)
Dept: ORTHOPEDICS | Facility: CLINIC | Age: 68
End: 2023-07-18
Payer: COMMERCIAL

## 2023-07-18 NOTE — TELEPHONE ENCOUNTER
Message reviewed. Patient is s/p ORIF of Right wrist on 06/26/2023. Reached out to patient by phone. Patient will be worked into schedule for this Friday, 07/14/2023. Date and time confirmed with patient.

## 2023-07-24 ENCOUNTER — TELEPHONE (OUTPATIENT)
Dept: ORTHOPEDICS | Facility: CLINIC | Age: 68
End: 2023-07-24
Payer: COMMERCIAL

## 2023-07-24 NOTE — TELEPHONE ENCOUNTER
Attempted to contact patient with no answer, left voicemail requesting patient to contact the clinic.

## 2023-07-24 NOTE — TELEPHONE ENCOUNTER
Returned call. M for patient to call back to address her questions and/or concerns about her appointment on Friday 7/28/23 with Dr. Mcfarlane.    ----- Message from Thom Hill sent at 7/24/2023 11:50 AM CDT -----  Regarding: Wants to speak to Sharyn about upcoming appt, call pt   Contact: pt   Wants to speak to Sharyn about upcoming appt, call pt

## 2023-07-24 NOTE — TELEPHONE ENCOUNTER
----- Message from Ron Ratliff MA sent at 7/24/2023  4:07 PM CDT -----  Contact: patient  Patient stated she wants an x-ray done prior to her appt on 7/28/23.    Call back number is [phone]

## 2023-07-25 DIAGNOSIS — S52.531D CLOSED COLLES' FRACTURE OF RIGHT RADIUS WITH ROUTINE HEALING: Primary | ICD-10-CM

## 2023-07-28 ENCOUNTER — HOSPITAL ENCOUNTER (OUTPATIENT)
Dept: RADIOLOGY | Facility: HOSPITAL | Age: 68
Discharge: HOME | End: 2023-07-28
Attending: ORTHOPAEDIC SURGERY
Payer: COMMERCIAL

## 2023-07-28 ENCOUNTER — PATIENT MESSAGE (OUTPATIENT)
Dept: ORTHOPEDICS | Facility: CLINIC | Age: 68
End: 2023-07-28

## 2023-07-28 ENCOUNTER — OFFICE VISIT (OUTPATIENT)
Dept: ORTHOPEDICS | Facility: CLINIC | Age: 68
End: 2023-07-28
Payer: COMMERCIAL

## 2023-07-28 VITALS — BODY MASS INDEX: 30.04 KG/M2 | WEIGHT: 175.94 LBS | HEIGHT: 64 IN

## 2023-07-28 DIAGNOSIS — S52.531D CLOSED COLLES' FRACTURE OF RIGHT RADIUS WITH ROUTINE HEALING: ICD-10-CM

## 2023-07-28 DIAGNOSIS — Z87.81 STATUS POST OPEN REDUCTION AND INTERNAL FIXATION (ORIF) OF FRACTURE: Primary | ICD-10-CM

## 2023-07-28 DIAGNOSIS — S52.531D CLOSED COLLES' FRACTURE OF RIGHT RADIUS WITH ROUTINE HEALING: Primary | ICD-10-CM

## 2023-07-28 DIAGNOSIS — Z98.890 STATUS POST OPEN REDUCTION AND INTERNAL FIXATION (ORIF) OF FRACTURE: Primary | ICD-10-CM

## 2023-07-28 PROCEDURE — 73110 XR WRIST COMPLETE 3 VIEWS RIGHT: ICD-10-PCS | Mod: 26,RT,, | Performed by: RADIOLOGY

## 2023-07-28 PROCEDURE — 1101F PR PT FALLS ASSESS DOC 0-1 FALLS W/OUT INJ PAST YR: ICD-10-PCS | Mod: CPTII,S$GLB,, | Performed by: ORTHOPAEDIC SURGERY

## 2023-07-28 PROCEDURE — 1159F PR MEDICATION LIST DOCUMENTED IN MEDICAL RECORD: ICD-10-PCS | Mod: CPTII,S$GLB,, | Performed by: ORTHOPAEDIC SURGERY

## 2023-07-28 PROCEDURE — 73110 X-RAY EXAM OF WRIST: CPT | Mod: 26,RT,, | Performed by: RADIOLOGY

## 2023-07-28 PROCEDURE — 1160F RVW MEDS BY RX/DR IN RCRD: CPT | Mod: CPTII,S$GLB,, | Performed by: ORTHOPAEDIC SURGERY

## 2023-07-28 PROCEDURE — 3008F PR BODY MASS INDEX (BMI) DOCUMENTED: ICD-10-PCS | Mod: CPTII,S$GLB,, | Performed by: ORTHOPAEDIC SURGERY

## 2023-07-28 PROCEDURE — 1125F PR PAIN SEVERITY QUANTIFIED, PAIN PRESENT: ICD-10-PCS | Mod: CPTII,S$GLB,, | Performed by: ORTHOPAEDIC SURGERY

## 2023-07-28 PROCEDURE — 1125F AMNT PAIN NOTED PAIN PRSNT: CPT | Mod: CPTII,S$GLB,, | Performed by: ORTHOPAEDIC SURGERY

## 2023-07-28 PROCEDURE — 99024 POSTOP FOLLOW-UP VISIT: CPT | Mod: S$GLB,,, | Performed by: ORTHOPAEDIC SURGERY

## 2023-07-28 PROCEDURE — 99024 PR POST-OP FOLLOW-UP VISIT: ICD-10-PCS | Mod: S$GLB,,, | Performed by: ORTHOPAEDIC SURGERY

## 2023-07-28 PROCEDURE — 99999 PR PBB SHADOW E&M-EST. PATIENT-LVL III: CPT | Mod: PBBFAC,,, | Performed by: ORTHOPAEDIC SURGERY

## 2023-07-28 PROCEDURE — 3008F BODY MASS INDEX DOCD: CPT | Mod: CPTII,S$GLB,, | Performed by: ORTHOPAEDIC SURGERY

## 2023-07-28 PROCEDURE — 73110 X-RAY EXAM OF WRIST: CPT | Mod: TC,PO,RT

## 2023-07-28 PROCEDURE — 1159F MED LIST DOCD IN RCRD: CPT | Mod: CPTII,S$GLB,, | Performed by: ORTHOPAEDIC SURGERY

## 2023-07-28 PROCEDURE — 3288F PR FALLS RISK ASSESSMENT DOCUMENTED: ICD-10-PCS | Mod: CPTII,S$GLB,, | Performed by: ORTHOPAEDIC SURGERY

## 2023-07-28 PROCEDURE — 1160F PR REVIEW ALL MEDS BY PRESCRIBER/CLIN PHARMACIST DOCUMENTED: ICD-10-PCS | Mod: CPTII,S$GLB,, | Performed by: ORTHOPAEDIC SURGERY

## 2023-07-28 PROCEDURE — 3288F FALL RISK ASSESSMENT DOCD: CPT | Mod: CPTII,S$GLB,, | Performed by: ORTHOPAEDIC SURGERY

## 2023-07-28 PROCEDURE — 99999 PR PBB SHADOW E&M-EST. PATIENT-LVL III: ICD-10-PCS | Mod: PBBFAC,,, | Performed by: ORTHOPAEDIC SURGERY

## 2023-07-28 PROCEDURE — 1101F PT FALLS ASSESS-DOCD LE1/YR: CPT | Mod: CPTII,S$GLB,, | Performed by: ORTHOPAEDIC SURGERY

## 2023-07-28 NOTE — PROGRESS NOTES
Subjective:      Patient ID: Gaby Donohue is a 68 y.o. female.    Chief Complaint: No chief complaint on file.    HPI  Patient is in for follow up status post ORIF of right distal radius fracture.  Pain is much improved  ROS      Objective:    Ortho Exam     Patient's incision is healing nicely  There is no erythema or drainage  Mild diffuse stiffness in her digits  Brisk capillary refill.    X-Ray Wrist Complete Right  Narrative: EXAMINATION:  XR WRIST COMPLETE 3 VIEWS RIGHT    CLINICAL HISTORY:  Colles' fracture of right radius, initial encounter for closed fracture    TECHNIQUE:  PA, lateral, and oblique views of the right wrist were performed.    COMPARISON:  06/23/2023    FINDINGS:  Interval open reduction and internal fixation of the distal radial fracture with improved position and alignment which appear good.  Plate is fixed to the distal radius anteriorly by multiple screws    No dislocation.  Impression: As above    Electronically signed by: Andreia Henry MD  Date:    07/07/2023  Time:    13:47       My Radiographs Findings:    I have personally reviewed radiographs and concur with above findings repeat radiographs of the wrist today show adequate hardware positioning good alignment  Assessment:       Encounter Diagnosis   Name Primary?    Closed Colles' fracture of right radius with routine healing Yes         Plan:       Discussed medical condition and treatment options with her at length.  We have discussed advancing range of motion we will get some OT PT prescribed for.  We have discussed brace wear as needed for the next 3-4 weeks.  No heavy lifting.  Follow up in 4-6 weeks sooner if any questions or problems.        Past Medical History:   Diagnosis Date    Anxiety     Basal cell carcinoma     scalp and lip    Cancer     skin ca on face    Depression     GERD (gastroesophageal reflux disease)     Hyperlipidemia     Wears glasses      Past Surgical History:   Procedure Laterality Date     CHOLECYSTECTOMY      COLONOSCOPY  04/20/2012    Dr. Jaime, 5 year recheck    COLONOSCOPY N/A 06/09/2021    Procedure: COLONOSCOPY;  Surgeon: Yasmeen Goldman MD;  Location: [facility] ENDO;  Service: Endoscopy;  Laterality: N/A;    ESOPHAGOGASTRODUODENOSCOPY  08/17/2012    Dr. Jaime; gastritis; bx unremarkable    ESOPHAGOGASTRODUODENOSCOPY N/A 06/09/2021    Procedure: EGD (ESOPHAGOGASTRODUODENOSCOPY);  Surgeon: Yasmeen Goldman MD;  Location: [facility] ENDO;  Service: Endoscopy;  Laterality: N/A;    FRACTURE SURGERY  11/02/2013    left arm Dr Cooper    laporoscopy      OPEN REDUCTION AND INTERNAL FIXATION (ORIF) OF INJURY OF WRIST Right 6/26/2023    Procedure: ORIF, WRIST;  Surgeon: Shivam Canela MD;  Location: [facility] OR;  Service: Orthopedics;  Laterality: Right;    UPPER GASTROINTESTINAL ENDOSCOPY           Current Outpatient Medications:     ALPRAZolam (XANAX) 0.25 MG tablet, Take one up to three times daily 30 minutes before takeoff for anxiety with flying, Disp: 20 tablet, Rfl: 0    DULoxetine (CYMBALTA) 30 MG capsule, TAKE THREE CAPSULES (90MG) BY MOUTH ONCE DAILY, Disp: 90 capsule, Rfl: 0    ibuprofen (ADVIL,MOTRIN) 600 MG tablet, Take 600 mg by mouth every 6 (six) hours as needed., Disp: , Rfl:     omeprazole (PRILOSEC) 40 MG capsule, Take 1 capsule (40 mg total) by mouth every morning., Disp: 90 capsule, Rfl: 0    ondansetron (ZOFRAN-ODT) 4 MG TbDL, Take 1 tablet (4 mg total) by mouth every 6 (six) hours as needed., Disp: 20 tablet, Rfl: 1    oxyCODONE-acetaminophen (PERCOCET)  mg per tablet, Take 1 tablet by mouth every 4 (four) hours as needed for Pain., Disp: 30 tablet, Rfl: 0    Review of patient's allergies indicates:   Allergen Reactions    No known drug allergies        Family History   Problem Relation Age of Onset    COPD Mother     Emphysema Mother     Heart disease Father     Cancer Sister         skin cancer face    Cancer Sister         skin cancer face    Cancer Sister         skin  cancer face     Cancer Brother         skin cancer face     No Known Problems Brother     No Known Problems Daughter     No Known Problems Daughter     Colon cancer Neg Hx     Colon polyps Neg Hx     Esophageal cancer Neg Hx     Stomach cancer Neg Hx      Social History     Occupational History    Not on file   Tobacco Use    Smoking status: Former     Packs/day: 1.50     Years: 21.00     Pack years: 31.50     Types: Cigarettes     Quit date: 1984     Years since quittin.2    Smokeless tobacco: Never   Substance and Sexual Activity    Alcohol use: Yes     Comment: occ    Drug use: No    Sexual activity: Yes     Partners: Male

## 2023-08-02 ENCOUNTER — TELEPHONE (OUTPATIENT)
Dept: ORTHOPEDICS | Facility: CLINIC | Age: 68
End: 2023-08-02
Payer: COMMERCIAL

## 2023-08-02 NOTE — TELEPHONE ENCOUNTER
----- Message from Thom Hill sent at 8/2/2023 11:37 AM CDT -----  Regarding: Wrist is red and burning, call pt   Contact: pt   Wrist is red and burning, call pt

## 2023-08-03 ENCOUNTER — TELEPHONE (OUTPATIENT)
Dept: ORTHOPEDICS | Facility: CLINIC | Age: 68
End: 2023-08-03
Payer: COMMERCIAL

## 2023-08-03 NOTE — TELEPHONE ENCOUNTER
----- Message from Archana Frias MA sent at 8/3/2023  9:36 AM CDT -----  Contact: pt  Directed to go to ER,  refused   Call back

## 2023-08-04 ENCOUNTER — OFFICE VISIT (OUTPATIENT)
Dept: ORTHOPEDICS | Facility: CLINIC | Age: 68
End: 2023-08-04
Payer: COMMERCIAL

## 2023-08-04 VITALS — RESPIRATION RATE: 18 BRPM | HEIGHT: 64 IN | BODY MASS INDEX: 30.04 KG/M2 | WEIGHT: 175.94 LBS

## 2023-08-04 DIAGNOSIS — Z98.890 STATUS POST OPEN REDUCTION AND INTERNAL FIXATION (ORIF) OF FRACTURE: Primary | ICD-10-CM

## 2023-08-04 DIAGNOSIS — Z87.81 STATUS POST OPEN REDUCTION AND INTERNAL FIXATION (ORIF) OF FRACTURE: Primary | ICD-10-CM

## 2023-08-04 PROCEDURE — 1160F PR REVIEW ALL MEDS BY PRESCRIBER/CLIN PHARMACIST DOCUMENTED: ICD-10-PCS | Mod: CPTII,S$GLB,, | Performed by: ORTHOPAEDIC SURGERY

## 2023-08-04 PROCEDURE — 1125F PR PAIN SEVERITY QUANTIFIED, PAIN PRESENT: ICD-10-PCS | Mod: CPTII,S$GLB,, | Performed by: ORTHOPAEDIC SURGERY

## 2023-08-04 PROCEDURE — 3008F BODY MASS INDEX DOCD: CPT | Mod: CPTII,S$GLB,, | Performed by: ORTHOPAEDIC SURGERY

## 2023-08-04 PROCEDURE — 3288F PR FALLS RISK ASSESSMENT DOCUMENTED: ICD-10-PCS | Mod: CPTII,S$GLB,, | Performed by: ORTHOPAEDIC SURGERY

## 2023-08-04 PROCEDURE — 99999 PR PBB SHADOW E&M-EST. PATIENT-LVL III: ICD-10-PCS | Mod: PBBFAC,,, | Performed by: ORTHOPAEDIC SURGERY

## 2023-08-04 PROCEDURE — 3288F FALL RISK ASSESSMENT DOCD: CPT | Mod: CPTII,S$GLB,, | Performed by: ORTHOPAEDIC SURGERY

## 2023-08-04 PROCEDURE — 99024 POSTOP FOLLOW-UP VISIT: CPT | Mod: S$GLB,,, | Performed by: ORTHOPAEDIC SURGERY

## 2023-08-04 PROCEDURE — 1159F MED LIST DOCD IN RCRD: CPT | Mod: CPTII,S$GLB,, | Performed by: ORTHOPAEDIC SURGERY

## 2023-08-04 PROCEDURE — 1160F RVW MEDS BY RX/DR IN RCRD: CPT | Mod: CPTII,S$GLB,, | Performed by: ORTHOPAEDIC SURGERY

## 2023-08-04 PROCEDURE — 1101F PR PT FALLS ASSESS DOC 0-1 FALLS W/OUT INJ PAST YR: ICD-10-PCS | Mod: CPTII,S$GLB,, | Performed by: ORTHOPAEDIC SURGERY

## 2023-08-04 PROCEDURE — 99024 PR POST-OP FOLLOW-UP VISIT: ICD-10-PCS | Mod: S$GLB,,, | Performed by: ORTHOPAEDIC SURGERY

## 2023-08-04 PROCEDURE — 99999 PR PBB SHADOW E&M-EST. PATIENT-LVL III: CPT | Mod: PBBFAC,,, | Performed by: ORTHOPAEDIC SURGERY

## 2023-08-04 PROCEDURE — 3008F PR BODY MASS INDEX (BMI) DOCUMENTED: ICD-10-PCS | Mod: CPTII,S$GLB,, | Performed by: ORTHOPAEDIC SURGERY

## 2023-08-04 PROCEDURE — 1101F PT FALLS ASSESS-DOCD LE1/YR: CPT | Mod: CPTII,S$GLB,, | Performed by: ORTHOPAEDIC SURGERY

## 2023-08-04 PROCEDURE — 1159F PR MEDICATION LIST DOCUMENTED IN MEDICAL RECORD: ICD-10-PCS | Mod: CPTII,S$GLB,, | Performed by: ORTHOPAEDIC SURGERY

## 2023-08-04 PROCEDURE — 1125F AMNT PAIN NOTED PAIN PRSNT: CPT | Mod: CPTII,S$GLB,, | Performed by: ORTHOPAEDIC SURGERY

## 2023-08-04 RX ORDER — CELECOXIB 100 MG/1
100 CAPSULE ORAL 2 TIMES DAILY
Qty: 60 CAPSULE | Refills: 1 | Status: SHIPPED | OUTPATIENT
Start: 2023-08-04 | End: 2023-10-03

## 2023-08-04 NOTE — PROGRESS NOTES
Subjective:      Patient ID: Gaby Donohue is a 68 y.o. female.    Chief Complaint: Pain and Swelling of the Right Wrist    HPI  Patient comes in with concerns over some swelling and general stiffness in her right hand she is yet to begin any therapy.  ROS      Objective:    Ortho Exam     Constitutional:   Patient is alert  and oriented in no acute distress  HEENT:  normocephalic atraumatic; PERRL EOMI  Neck:  Supple without adenopathy  Cardiovascular:  Normal rate and rhythm  Pulmonary:  Normal respiratory effort normal chest wall expansion  Abdominal:  Nonprotuberant nondistended  Musculoskeletal:  Patient has not adequately healing incision.  She has some generalized wrist stiffness  Still has some general edema in her digits  Neurological:  No focal defect; cranial nerves 2-12 grossly intact  Psychiatric/behavioral:  Mood and behavior normal      X-Ray Wrist Complete Right  EXAMINATION:  XR WRIST COMPLETE 3 VIEWS RIGHT    CLINICAL HISTORY:  Colles' fracture of right radius, subsequent encounter for closed fracture with routine healing    TECHNIQUE:  PA, lateral, and oblique views of the right wrist were performed.    COMPARISON:  07/07/2023    FINDINGS:  Cast has been removed.    Volar plate screw fixation of a distal radius fracture.  No detrimental change in alignment.  Degenerative change mild at the triscaphe joint thumb CMC joint and thumb MCP joint.  Osteopenia.    Electronically signed by: David Haddad  Date:    07/28/2023  Time:    13:51       My Radiographs Findings:    I have personally reviewed radiographs and concur with above findings    Assessment:       Encounter Diagnosis   Name Primary?    Status post open reduction and internal fixation (ORIF) of fracture Yes         Plan:       I have discussed medical condition and treatment options with her at length.  I have given her some reassurance I see no cause for concern.  I have encouraged her to pursue with the occupational therapy that we  have recommended.  I have discussed once again activity restrictions brace wear and follow up in 4-6 weeks sooner if any questions or problems.        Past Medical History:   Diagnosis Date    Anxiety     Basal cell carcinoma     scalp and lip    Cancer     skin ca on face    Depression     GERD (gastroesophageal reflux disease)     Hyperlipidemia     Wears glasses      Past Surgical History:   Procedure Laterality Date    CHOLECYSTECTOMY      COLONOSCOPY  04/20/2012    Dr. Jaime, 5 year recheck    COLONOSCOPY N/A 06/09/2021    Procedure: COLONOSCOPY;  Surgeon: Yasmeen Goldman MD;  Location: [facility] ENDO;  Service: Endoscopy;  Laterality: N/A;    ESOPHAGOGASTRODUODENOSCOPY  08/17/2012    Dr. Jaime; gastritis; bx unremarkable    ESOPHAGOGASTRODUODENOSCOPY N/A 06/09/2021    Procedure: EGD (ESOPHAGOGASTRODUODENOSCOPY);  Surgeon: Yasmeen Goldman MD;  Location: [facility] ENDO;  Service: Endoscopy;  Laterality: N/A;    FRACTURE SURGERY  11/02/2013    left arm Dr Cooper    laporoscopy      OPEN REDUCTION AND INTERNAL FIXATION (ORIF) OF INJURY OF WRIST Right 6/26/2023    Procedure: ORIF, WRIST;  Surgeon: Shivam Canela MD;  Location: [facility] OR;  Service: Orthopedics;  Laterality: Right;    UPPER GASTROINTESTINAL ENDOSCOPY           Current Outpatient Medications:     ALPRAZolam (XANAX) 0.25 MG tablet, Take one up to three times daily 30 minutes before takeoff for anxiety with flying, Disp: 20 tablet, Rfl: 0    DULoxetine (CYMBALTA) 30 MG capsule, TAKE THREE CAPSULES (90MG) BY MOUTH ONCE DAILY, Disp: 90 capsule, Rfl: 0    ibuprofen (ADVIL,MOTRIN) 600 MG tablet, Take 600 mg by mouth every 6 (six) hours as needed., Disp: , Rfl:     omeprazole (PRILOSEC) 40 MG capsule, Take 1 capsule (40 mg total) by mouth every morning., Disp: 90 capsule, Rfl: 0    ondansetron (ZOFRAN-ODT) 4 MG TbDL, Take 1 tablet (4 mg total) by mouth every 6 (six) hours as needed., Disp: 20 tablet, Rfl: 1    oxyCODONE-acetaminophen (PERCOCET)   mg per tablet, Take 1 tablet by mouth every 4 (four) hours as needed for Pain., Disp: 30 tablet, Rfl: 0    Review of patient's allergies indicates:   Allergen Reactions    No known drug allergies        Family History   Problem Relation Age of Onset    COPD Mother     Emphysema Mother     Heart disease Father     Cancer Sister         skin cancer face    Cancer Sister         skin cancer face    Cancer Sister         skin cancer face     Cancer Brother         skin cancer face     No Known Problems Brother     No Known Problems Daughter     No Known Problems Daughter     Colon cancer Neg Hx     Colon polyps Neg Hx     Esophageal cancer Neg Hx     Stomach cancer Neg Hx      Social History     Occupational History    Not on file   Tobacco Use    Smoking status: Former     Current packs/day: 0.00     Average packs/day: 1.5 packs/day for 21.0 years (31.5 ttl pk-yrs)     Types: Cigarettes     Start date: 1963     Quit date: 1984     Years since quittin.3    Smokeless tobacco: Never   Substance and Sexual Activity    Alcohol use: Yes     Comment: occ    Drug use: No    Sexual activity: Yes     Partners: Male

## 2023-08-06 PROBLEM — M25.531 RIGHT WRIST PAIN: Status: ACTIVE | Noted: 2023-08-06

## 2023-08-06 NOTE — PROGRESS NOTES
Patient evaluated and plan of care established.  Please sign and return if in agreement.    Thank you,  CLARISSA Bass           Occupational Therapy [facility]  Initial Evaluation     Date: 8/8/2023  Name: Gaby Donohue  Clinic Number: [id number]    Therapy Diagnosis: Z98.890,Z87.81 (ICD-10-CM) - Status post open reduction and internal fixation (ORIF) of fracture, S52.531D (ICD-10-CM) - Closed Colles' fracture of right radius with routine healing; brace as needed; no heavy lifting  Encounter Diagnosis   Name Primary?    Right wrist pain      Physician: Tu Mcfarlane,*    Physician Orders: Z98.890,Z87.81 (ICD-10-CM) - Status post open reduction and internal fixation (ORIF) of fracture, S52.531D (ICD-10-CM) - Closed Colles' fracture of right radius with routine healing  Surgical Procedure and Date: status post  ORIF of (Right) distal radius fracture, 06/26/2023   post op: 5 weeks, 6 days  Dominant Side: Left  Date of Onset: 06/12/2023  History / Mechanism of Injury: Patient underwent ORIF of (Right) distal radius fracture on 06/26/2023.  Patient went for a follow up on 07/28/2023 and was referred to therapy.    Previous Therapy:  none    Environmental Concerns/Fall Risk: None  Language: None  Cultural/Spiritual: None  Abuse/Neglect/Nutritional: None  Imaging / Tests: See Epic    Past Medical History/Physical Systems Review:    has a past medical history of Anxiety, Basal cell carcinoma, Cancer, Depression, GERD (gastroesophageal reflux disease), Hyperlipidemia, and Wears glasses.     has a past surgical history that includes Cholecystectomy; Fracture surgery (11/02/2013); Colonoscopy (04/20/2012); laporoscopy; Esophagogastroduodenoscopy (08/17/2012); Esophagogastroduodenoscopy (N/A, 06/09/2021); Colonoscopy (N/A, 06/09/2021); Upper gastrointestinal endoscopy; and Open reduction and internal fixation (ORIF) of injury of wrist (Right, 6/26/2023).    has a current medication list which includes the following  "prescription(s): alprazolam, celecoxib, duloxetine, ibuprofen, omeprazole, ondansetron, and oxycodone-acetaminophen.    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 Reports, "skin Cancer, no pacemaker, no allergies to latex or adhesives."      Subjective     Patient Reports, "I was in [place] and [place] on the subway.  I was holding on and went to sit and as I was sitting they took off and I landed on my bottom and braced myself with my (Right) hand.  I went to the ED in [place].  They did x-rays and they put it in a soft cast.  I came back 2 days later and made an appointment when I got back.  I had surgery on 2023.   I went for a follow up with the doctor and they said my limitations are lifting or pushing 20 pounds. I am having trouble opening containers, holding utensils to cut food, turning palm up and palm down.  I do have pain on the back and either sides of my wrist sort of like its bruised.  I am having trigger finger in my (Right) ring finger.  I am having trouble making a full fist."     Pain   At rest:  0/10  With work/ Activity:  6/10  Sleepin/10  Location of Pain: (Right) wrist    Patient's Goals for Therapy: decrease pain and increase function    Objective     Sensation: grossly intact  Scar / Wound: closed, cool to touch, pinkish in color  Edema:   centimeters (Right) / (Left)  Metacarpals:  19.8  / 18.9  Proximal Wrist Crease:  19.0  / 16.7             Active Range of Motion: hand   ( measured with wrist goni from " "digital tip to Distal Palmar Crease) cm  (Right)    II:  3.2   III:  2.4  IV: 1.9  V: 0.0    Opposition:  2.5  Comments: (Measured with wrist goni from thumb tip to base of 5th metacarpal) centimeters                                                    Active Range of Motion: wrist                         (Right)   //  (Left)  Dorsal Flexion /Volar Flexion : 50 / 55  // 65/80  Radial Deviation /Ulnar Deviation:  5 / 12  // 13/30  Supination / Pronation: 60 / 65  // 80/85    Passive Range of Motion: wrist         ( submaximally, within pain tolerance)                    (Right)    Dorsal Flexion /Volar Flexion : 60 / 65  Radial Deviation /Ulnar Deviation:  8 / 18  Supination / Pronation:  70 / 75      Comments: fracture (Left) wrist approx 9 years ago    Manual Muscle Test:  Wrist    ( submaximally, within pain tolerance)   (Right)    Dorsal Flexion:  To be assessed   Volar Flexion:  To be assessed   Radial Deviation:  To be assessed   Ulnar Deviation:  To be assessed          Strength: (DORIE Dynamometer in pounds),Position II    ( submaximally, within pain tolerance)   (Right):   To be assessed   (Left):   To be assessed     Pinch Strength: (Pinch Gauge in pounds)    ( submaximally, within pain tolerance)              (Right) /  (Left)  Lateral Pinch:  To be assessed   3 Point Pinch:  To be assessed   2 Point Pinch:  To be assessed         FOTO SCORE: 49%      Treatment Included:   Occupational Therapy  evaluation and instruction in written Home Exercise Program including Thumb opposition, Tendon glides for intrinsic +/-, flat fist, and composite fist; 0# Dorsal Flexion, Volar Flexion Radial Deviation, Ulnar Deviation, Supination, and Pronation x 10 repetitions x 3 x daily;  "Prayer stretch" and [place] for Passive range of motion for Volar Flexion x 10 second holds x 5 reps x 3 x daily.    Patient. Educated on modalities for home pain management, activity modifications and precautions, Multidirectional scar massage " for scar management  / Distal to proximal edema massage for edema management.  Patient. Provided with silicone scar pad for night use for scar management.  Patient. Demo understanding of above.     Patient/Family Education: role of Occupational Therapy, goals for Occupational Therapy, scheduling/cancellations - patient verbalized understanding. Discussed insurance limitations with patient.        Assessment:     Problem List :       Decreased Range of motion,  Decreased functional abilities,  Increased pain,   Edema      During the evaluation, the patient required Minimal modification or assistance.  The following co-morbid conditions/personal factors may affect the plan of care: none .        Gaby Donohue is a 68 y.o. female referred to outpatient occupational therapy and presents with a medical diagnosis of Z98.890,Z87.81 (ICD-10-CM) - Status post open reduction and internal fixation (ORIF) of fracture, S52.531D (ICD-10-CM) - Closed Colles' fracture of right radius with routine healing, resulting in limited range of motion, strength, functional abilities, increased pain and inflammation and demonstrates limitations as described in the chart above. Following medical record review it is determined that patient will benefit from occupational therapy services in order to maximize pain free and/or functional use of right wrist. The following goals were discussed with the patient and patient is in agreement with them as to be addressed in the treatment plan. The patient's rehab potential is Good.     Anticipated Barriers to Therapy: None     The following goals were discussed with the patient and patient is in agreement with them as to be addressed in the treatment plan.     Goals:   Goals:  1)   Patient to be Independent with Home exercise program and modalities for pain management by 1 week.   2)   Patient will report   4/10 pain on average with activity to assist with exercises by 6-8 weeks.  3)   Patient will  increase range of Motion by 3-5 degrees to increase functional use for activities of daily living by 6-8 weeks.  4)  Patient will decrease edema by 0.1-0.3 millimeters  to increase joint mobility /flexibility by 6-8 weeks.         Plan:   Patient to be treated by Occupational Therapy    2    times per week ( depending on work schedule) for     90 days   during the certification period from   08/08/2023  to 11/08/2023     to achieve the established goals.  Treatment to include :  paraffin, fluidotherapy, manual therapy/joint mobilizations, kinesiotaping, dry needling performed by certified physical therapist,   modalities for pain management, Ultrasound 3.0mhz, therapeutic exercises/activities,        strengthening,    as well as any other treatments deemed necessary based on the patient's needs or progress.     Will reassess as needed and adjust treatment plan accordingly.              I certify the need for these services furnished under this plan of treatment and while under my care    ____________________________________                         __________________  Physician/Referring Practitioner                                               Date of Signature    Debbie Aguilar OT

## 2023-08-07 ENCOUNTER — OFFICE VISIT (OUTPATIENT)
Dept: DERMATOLOGY | Facility: CLINIC | Age: 68
End: 2023-08-07
Payer: COMMERCIAL

## 2023-08-07 VITALS — BODY MASS INDEX: 29.88 KG/M2 | WEIGHT: 175 LBS | HEIGHT: 64 IN

## 2023-08-07 DIAGNOSIS — L57.0 ACTINIC KERATOSES: ICD-10-CM

## 2023-08-07 DIAGNOSIS — D04.9 SQUAMOUS CELL CARCINOMA IN SITU (SCCIS) OF SKIN: Primary | ICD-10-CM

## 2023-08-07 DIAGNOSIS — L82.1 SEBORRHEIC KERATOSES: ICD-10-CM

## 2023-08-07 PROCEDURE — 1160F PR REVIEW ALL MEDS BY PRESCRIBER/CLIN PHARMACIST DOCUMENTED: ICD-10-PCS | Mod: CPTII,S$GLB,, | Performed by: DERMATOLOGY

## 2023-08-07 PROCEDURE — 99213 OFFICE O/P EST LOW 20 MIN: CPT | Mod: 25,S$GLB,, | Performed by: DERMATOLOGY

## 2023-08-07 PROCEDURE — 3008F PR BODY MASS INDEX (BMI) DOCUMENTED: ICD-10-PCS | Mod: CPTII,S$GLB,, | Performed by: DERMATOLOGY

## 2023-08-07 PROCEDURE — 3008F BODY MASS INDEX DOCD: CPT | Mod: CPTII,S$GLB,, | Performed by: DERMATOLOGY

## 2023-08-07 PROCEDURE — 1100F PTFALLS ASSESS-DOCD GE2>/YR: CPT | Mod: CPTII,S$GLB,, | Performed by: DERMATOLOGY

## 2023-08-07 PROCEDURE — 1160F RVW MEDS BY RX/DR IN RCRD: CPT | Mod: CPTII,S$GLB,, | Performed by: DERMATOLOGY

## 2023-08-07 PROCEDURE — 17000 DESTRUCT PREMALG LESION: CPT | Mod: S$GLB,,, | Performed by: DERMATOLOGY

## 2023-08-07 PROCEDURE — 1159F PR MEDICATION LIST DOCUMENTED IN MEDICAL RECORD: ICD-10-PCS | Mod: CPTII,S$GLB,, | Performed by: DERMATOLOGY

## 2023-08-07 PROCEDURE — 99213 PR OFFICE/OUTPT VISIT, EST, LEVL III, 20-29 MIN: ICD-10-PCS | Mod: 25,S$GLB,, | Performed by: DERMATOLOGY

## 2023-08-07 PROCEDURE — 17000 PR DESTRUCTION(LASER SURGERY,CRYOSURGERY,CHEMOSURGERY),PREMALIGNANT LESIONS,FIRST LESION: ICD-10-PCS | Mod: S$GLB,,, | Performed by: DERMATOLOGY

## 2023-08-07 PROCEDURE — 3288F FALL RISK ASSESSMENT DOCD: CPT | Mod: CPTII,S$GLB,, | Performed by: DERMATOLOGY

## 2023-08-07 PROCEDURE — 1100F PR PT FALLS ASSESS DOC 2+ FALLS/FALL W/INJURY/YR: ICD-10-PCS | Mod: CPTII,S$GLB,, | Performed by: DERMATOLOGY

## 2023-08-07 PROCEDURE — 1159F MED LIST DOCD IN RCRD: CPT | Mod: CPTII,S$GLB,, | Performed by: DERMATOLOGY

## 2023-08-07 PROCEDURE — 3288F PR FALLS RISK ASSESSMENT DOCUMENTED: ICD-10-PCS | Mod: CPTII,S$GLB,, | Performed by: DERMATOLOGY

## 2023-08-07 NOTE — PROGRESS NOTES
Subjective:      Patient ID:  Gaby Donohue is a 68 y.o. female who presents for   Chief Complaint   Patient presents with    Follow-up     Bx site      LOV 4/13/23- NUB    . Skin, left nasofacial sulcus, shave biopsy:   - INFLAMED AND IRRITATED SEBORRHEIC KERATOSIS WITH FOCAL AREAS OF PARTIAL THICKNESS SUPERIMPOSED BOWENOID SQUAMOUS ATYPIA.   - FOCI OF SQUAMOUS ATYPIA INVOLVING THE BASILAR LAYERS OF THE INVOLVED EPIDERMIS ARE TRANSECTED AT THE BASE OF THE BIOPSY.     Patient here today for F/U on bx site, no concerns.     Derm Hx:  BCC right temple- 3/8/23 Matthew  4 Mohs operations to face over the past 20 years  Denies Fhx MM    Current Outpatient Medications:   ·  ALPRAZolam (XANAX) 0.25 MG tablet, Take one up to three times daily 30 minutes before takeoff for anxiety with flying, Disp: 20 tablet, Rfl: 0  ·  celecoxib (CELEBREX) 100 MG capsule, Take 1 capsule (100 mg total) by mouth 2 (two) times daily., Disp: 60 capsule, Rfl: 1  ·  DULoxetine (CYMBALTA) 30 MG capsule, TAKE THREE CAPSULES (90MG) BY MOUTH ONCE DAILY, Disp: 90 capsule, Rfl: 0  ·  ibuprofen (ADVIL,MOTRIN) 600 MG tablet, Take 600 mg by mouth every 6 (six) hours as needed., Disp: , Rfl:   ·  omeprazole (PRILOSEC) 40 MG capsule, Take 1 capsule (40 mg total) by mouth every morning., Disp: 90 capsule, Rfl: 0  ·  ondansetron (ZOFRAN-ODT) 4 MG TbDL, Take 1 tablet (4 mg total) by mouth every 6 (six) hours as needed., Disp: 20 tablet, Rfl: 1  ·  oxyCODONE-acetaminophen (PERCOCET)  mg per tablet, Take 1 tablet by mouth every 4 (four) hours as needed for Pain., Disp: 30 tablet, Rfl: 0                   Review of Systems   Constitutional:  Negative for fever, chills and fatigue.   Skin:  Positive for dry skin, activity-related sunscreen use and wears hat. Negative for itching and daily sunscreen use.       Objective:   Physical Exam   Skin:   Areas Examined (abnormalities noted in diagram):   Head / Face Inspection Performed            Diagram  Legend     Erythematous scaling macule/papule c/w actinic keratosis       Vascular papule c/w angioma      Pigmented verrucoid papule/plaque c/w seborrheic keratosis      Yellow umbilicated papule c/w sebaceous hyperplasia      Irregularly shaped tan macule c/w lentigo     1-2 mm smooth white papules consistent with Milia      Movable subcutaneous cyst with punctum c/w epidermal inclusion cyst      Subcutaneous movable cyst c/w pilar cyst      Firm pink to brown papule c/w dermatofibroma      Pedunculated fleshy papule(s) c/w skin tag(s)      Evenly pigmented macule c/w junctional nevus     Mildly variegated pigmented, slightly irregular-bordered macule c/w mildly atypical nevus      Flesh colored to evenly pigmented papule c/w intradermal nevus       Pink pearly papule/plaque c/w basal cell carcinoma      Erythematous hyperkeratotic cursted plaque c/w SCC      Surgical scar with no sign of skin cancer recurrence      Open and closed comedones      Inflammatory papules and pustules      Verrucoid papule consistent consistent with wart     Erythematous eczematous patches and plaques     Dystrophic onycholytic nail with subungual debris c/w onychomycosis     Umbilicated papule    Erythematous-base heme-crusted tan verrucoid plaque consistent with inflamed seborrheic keratosis     Erythematous Silvery Scaling Plaque c/w Psoriasis     See annotation        Assessment / Plan:        Squamous cell carcinoma in situ (SCCIS) of skin  Biopsy site examined, no recurrence    Actinic keratoses  Cryosurgery Procedure Note    Verbal consent from the patient is obtained and the patient is aware of the precancerous quality and need for treatment of these lesions. Liquid nitrogen cryosurgery is applied to the 1 actinic keratoses, as detailed in the physical exam, to produce a freeze injury. The patient is aware that blisters may form and is instructed on wound care with gentle cleansing and use of vaseline ointment to keep moist  until healed. The patient is supplied a handout on cryosurgery and is instructed to call if lesions do not completely resolve.    Seborrheic keratoses  These are benign inherited growths without a malignant potential. Reassurance given to patient. No treatment is necessary.     Patient instructed in importance in daily broad spectrum sun protection of at least spf 30. Mineral sunscreen ingredients preferred (Zinc +/- Titanium) and can be found OTC.   Recommend Elta MD for daily use on face and neck.  Patient encouraged to wear hat for all outdoor exposure.   Also discussed sun avoidance and use of protective clothing.           Follow up in about 6 months (around 2/7/2024).

## 2023-08-08 ENCOUNTER — CLINICAL SUPPORT (OUTPATIENT)
Dept: REHABILITATION | Facility: HOSPITAL | Age: 68
End: 2023-08-08
Attending: ORTHOPAEDIC SURGERY
Payer: COMMERCIAL

## 2023-08-08 DIAGNOSIS — S52.531D CLOSED COLLES' FRACTURE OF RIGHT RADIUS WITH ROUTINE HEALING: ICD-10-CM

## 2023-08-08 DIAGNOSIS — F32.5 MAJOR DEPRESSIVE DISORDER WITH SINGLE EPISODE, IN FULL REMISSION: ICD-10-CM

## 2023-08-08 DIAGNOSIS — M25.531 RIGHT WRIST PAIN: ICD-10-CM

## 2023-08-08 DIAGNOSIS — Z98.890 STATUS POST OPEN REDUCTION AND INTERNAL FIXATION (ORIF) OF FRACTURE: ICD-10-CM

## 2023-08-08 DIAGNOSIS — Z87.81 STATUS POST OPEN REDUCTION AND INTERNAL FIXATION (ORIF) OF FRACTURE: ICD-10-CM

## 2023-08-08 PROCEDURE — 97165 OT EVAL LOW COMPLEX 30 MIN: CPT | Mod: PN

## 2023-08-08 PROCEDURE — 97530 THERAPEUTIC ACTIVITIES: CPT | Mod: PN

## 2023-08-08 RX ORDER — DULOXETIN HYDROCHLORIDE 30 MG/1
CAPSULE, DELAYED RELEASE ORAL
Qty: 90 CAPSULE | Refills: 2 | Status: SHIPPED | OUTPATIENT
Start: 2023-08-08 | End: 2023-11-27 | Stop reason: SDUPTHER

## 2023-08-08 NOTE — PLAN OF CARE
Patient evaluated and plan of care established.  Please sign and return if in agreement.    Thank you,  CLARISSA Bass           Occupational Therapy [facility]  Initial Evaluation     Date: 8/8/2023  Name: Gaby Donohue  Clinic Number: [id number]    Therapy Diagnosis: Z98.890,Z87.81 (ICD-10-CM) - Status post open reduction and internal fixation (ORIF) of fracture, S52.531D (ICD-10-CM) - Closed Colles' fracture of right radius with routine healing; brace as needed; no heavy lifting  Encounter Diagnosis   Name Primary?    Right wrist pain      Physician: Tu Mcfarlane,*    Physician Orders: Z98.890,Z87.81 (ICD-10-CM) - Status post open reduction and internal fixation (ORIF) of fracture, S52.531D (ICD-10-CM) - Closed Colles' fracture of right radius with routine healing  Surgical Procedure and Date: status post  ORIF of (Right) distal radius fracture, 06/26/2023   post op: 5 weeks, 6 days  Dominant Side: Left  Date of Onset: 06/12/2023  History / Mechanism of Injury: Patient underwent ORIF of (Right) distal radius fracture on 06/26/2023.  Patient went for a follow up on 07/28/2023 and was referred to therapy.    Previous Therapy:  none    Environmental Concerns/Fall Risk: None  Language: None  Cultural/Spiritual: None  Abuse/Neglect/Nutritional: None  Imaging / Tests: See Epic    Past Medical History/Physical Systems Review:    has a past medical history of Anxiety, Basal cell carcinoma, Cancer, Depression, GERD (gastroesophageal reflux disease), Hyperlipidemia, and Wears glasses.     has a past surgical history that includes Cholecystectomy; Fracture surgery (11/02/2013); Colonoscopy (04/20/2012); laporoscopy; Esophagogastroduodenoscopy (08/17/2012); Esophagogastroduodenoscopy (N/A, 06/09/2021); Colonoscopy (N/A, 06/09/2021); Upper gastrointestinal endoscopy; and Open reduction and internal fixation (ORIF) of injury of wrist (Right, 6/26/2023).    has a current medication list which includes the following  "prescription(s): alprazolam, celecoxib, duloxetine, ibuprofen, omeprazole, ondansetron, and oxycodone-acetaminophen.    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 Reports, "skin Cancer, no pacemaker, no allergies to latex or adhesives."      Subjective     Patient Reports, "I was in [place] and [place] on the subway.  I was holding on and went to sit and as I was sitting they took off and I landed on my bottom and braced myself with my (Right) hand.  I went to the ED in [place].  They did x-rays and they put it in a soft cast.  I came back 2 days later and made an appointment when I got back.  I had surgery on 2023.   I went for a follow up with the doctor and they said my limitations are lifting or pushing 20 pounds. I am having trouble opening containers, holding utensils to cut food, turning palm up and palm down.  I do have pain on the back and either sides of my wrist sort of like its bruised.  I am having trigger finger in my (Right) ring finger.  I am having trouble making a full fist."     Pain   At rest:  0/10  With work/ Activity:  6/10  Sleepin/10  Location of Pain: (Right) wrist    Patient's Goals for Therapy: decrease pain and increase function    Objective     Sensation: grossly intact  Scar / Wound: closed, cool to touch, pinkish in color  Edema:   centimeters (Right) / (Left)  Metacarpals:  19.8  / 18.9  Proximal Wrist Crease:  19.0  / 16.7             Active Range of Motion: hand   ( measured with wrist goni from " "digital tip to Distal Palmar Crease) cm  (Right)    II:  3.2   III:  2.4  IV: 1.9  V: 0.0    Opposition:  2.5  Comments: (Measured with wrist goni from thumb tip to base of 5th metacarpal) centimeters                                                    Active Range of Motion: wrist                         (Right)   //  (Left)  Dorsal Flexion /Volar Flexion : 50 / 55  // 65/80  Radial Deviation /Ulnar Deviation:  5 / 12  // 13/30  Supination / Pronation: 60 / 65  // 80/85    Passive Range of Motion: wrist         ( submaximally, within pain tolerance)                    (Right)    Dorsal Flexion /Volar Flexion : 60 / 65  Radial Deviation /Ulnar Deviation:  8 / 18  Supination / Pronation:  70 / 75      Comments: fracture (Left) wrist approx 9 years ago    Manual Muscle Test:  Wrist    ( submaximally, within pain tolerance)   (Right)    Dorsal Flexion:  To be assessed   Volar Flexion:  To be assessed   Radial Deviation:  To be assessed   Ulnar Deviation:  To be assessed          Strength: (DORIE Dynamometer in pounds),Position II    ( submaximally, within pain tolerance)   (Right):   To be assessed   (Left):   To be assessed     Pinch Strength: (Pinch Gauge in pounds)    ( submaximally, within pain tolerance)              (Right) /  (Left)  Lateral Pinch:  To be assessed   3 Point Pinch:  To be assessed   2 Point Pinch:  To be assessed         FOTO SCORE: 49%      Treatment Included:   Occupational Therapy  evaluation and instruction in written Home Exercise Program including Thumb opposition, Tendon glides for intrinsic +/-, flat fist, and composite fist; 0# Dorsal Flexion, Volar Flexion Radial Deviation, Ulnar Deviation, Supination, and Pronation x 10 repetitions x 3 x daily;  "Prayer stretch" and [place] for Passive range of motion for Volar Flexion x 10 second holds x 5 reps x 3 x daily.    Patient. Educated on modalities for home pain management, activity modifications and precautions, Multidirectional scar massage " for scar management  / Distal to proximal edema massage for edema management.  Patient. Provided with silicone scar pad for night use for scar management.  Patient. Demo understanding of above.     Patient/Family Education: role of Occupational Therapy, goals for Occupational Therapy, scheduling/cancellations - patient verbalized understanding. Discussed insurance limitations with patient.        Assessment:     Problem List :       Decreased Range of motion,  Decreased functional abilities,  Increased pain,   Edema      During the evaluation, the patient required Minimal modification or assistance.  The following co-morbid conditions/personal factors may affect the plan of care: none .        Gaby Donohue is a 68 y.o. female referred to outpatient occupational therapy and presents with a medical diagnosis of Z98.890,Z87.81 (ICD-10-CM) - Status post open reduction and internal fixation (ORIF) of fracture, S52.531D (ICD-10-CM) - Closed Colles' fracture of right radius with routine healing, resulting in limited range of motion, strength, functional abilities, increased pain and inflammation and demonstrates limitations as described in the chart above. Following medical record review it is determined that patient will benefit from occupational therapy services in order to maximize pain free and/or functional use of right wrist. The following goals were discussed with the patient and patient is in agreement with them as to be addressed in the treatment plan. The patient's rehab potential is Good.     Anticipated Barriers to Therapy: None     The following goals were discussed with the patient and patient is in agreement with them as to be addressed in the treatment plan.     Goals:   Goals:  1)   Patient to be Independent with Home exercise program and modalities for pain management by 1 week.   2)   Patient will report   4/10 pain on average with activity to assist with exercises by 6-8 weeks.  3)   Patient will  increase range of Motion by 3-5 degrees to increase functional use for activities of daily living by 6-8 weeks.  4)  Patient will decrease edema by 0.1-0.3 millimeters  to increase joint mobility /flexibility by 6-8 weeks.         Plan:   Patient to be treated by Occupational Therapy    2    times per week ( depending on work schedule) for     90 days   during the certification period from   08/08/2023  to 11/08/2023     to achieve the established goals.  Treatment to include :  paraffin, fluidotherapy, manual therapy/joint mobilizations, kinesiotaping, dry needling performed by certified physical therapist,   modalities for pain management, Ultrasound 3.0mhz, therapeutic exercises/activities,        strengthening,    as well as any other treatments deemed necessary based on the patient's needs or progress.     Will reassess as needed and adjust treatment plan accordingly.              I certify the need for these services furnished under this plan of treatment and while under my care    ____________________________________                         __________________  Physician/Referring Practitioner                                               Date of Signature    Debbie Aguilar OT

## 2023-08-08 NOTE — TELEPHONE ENCOUNTER
No care due was identified.  [facility] Embedded Care Due Messages. Reference number: [id number].   8/08/2023 1:27:05 PM CDT

## 2023-08-08 NOTE — PROGRESS NOTES
"                                  Occupational Therapy Daily Treatment Note       Date: 8/18/2023  Name: Gaby Donohue  [facility] Number: [id number]    Therapy Diagnosis: Z98.890,Z87.81 (ICD-10-CM) - Status post open reduction and internal fixation (ORIF) of fracture, S52.531D (ICD-10-CM) - Closed Colles' fracture of right radius with routine healing  Encounter Diagnosis   Name Primary?    Right wrist pain Yes     Physician: Tu Mcfarlane,*    Physician Orders: Z98.890,Z87.81 (ICD-10-CM) - Status post open reduction and internal fixation (ORIF) of fracture, S52.531D (ICD-10-CM) - Closed Colles' fracture of right radius with routine healing, brace as needed, no heavy lifting; evaluate and treat  Medical Diagnosis: Z98.890,Z87.81 (ICD-10-CM) - Status post open reduction and internal fixation (ORIF) of fracture, S52.531D (ICD-10-CM) - Closed Colles' fracture of right radius with routine healing; (Right) wrist pain  Surgical Procedure and Date: status post ORIF of (Right) wrist, 06/26/2023    Insurance Authorization Period Expiration: 08/08/2023-12/29/2023  Plan of Care Certification Period: 08/08/2023-11/08/2023  Date of Return to MD: as needed    Evaluation FOTO: 08/08/2023 = 49%     Visit # / Visits authorized: 1 / 20  Time In: 1:00  Time Out: 1:45  Total Billable Time: 40  minutes    Precautions:  Standard; skin cancer;  brace as needed; no heavy lifting       Post Op: 06/26/2023 = 7 weeks, 2 days      Subjective     Patient reports: "I have pain in my hand depending on the movement.  I have been doing my exercises at home."    Pain: 2-3/10   Location of pain: (Right) wrist      Objective    Patient seen by Occupational Therapy this session. Tx consisted of:      Supervised modalities after being cleared for contradictions  x  10   minutes:     -Fluidotherapy to  (Right)   hand to increase blood flow, circulation, tissue elasticity, desensitization, sensory re-education and for pain management  x 10 " "minutes.      Manual therapy techniques to increase joint mobilization /// scar massage/// soft tissue mobilization   x   8 minutes:     -Scar Massage to volar aspect of (Right) wrist region around incision site  with  mini therapeutic vibrator // mini dowel massager  to decrease dense scar adhesions and improve tensile glide  x 2 minute  -Manual Therapy techniques to (Right) wrist and thumb  including joint mobilizations, stretching, and Passive Range of Motion to increase joint mobility, range of motion  and for pain management  x 5 minutes   -Soft Tissue Mobilization to (Right) hand into wrist and forearm from distal to proximal to decrease edema and increase range of motion and functional abilities  x  1 minute      Therapeutic Exercises to improve functional performance while increasing strength, endurance, range of motion,  and flexibility  x   12  minutes:      - Active Assistive Range of Motion for isolated and composite digital extension / flexion of thumb and wrist in all planes x 5   Repetitions   - Velcro checkerboard (on wedge) with "dart throwing" pattern x 1 repetition  - 0# Dorsiflexion/Volar flexion (Supination /Pronation ), Radial Deviation / Ulnar Deviation x 10 repetitions  - 0# dowel for Supination /Pronation  x 10 repetitions    - Weighted ball on tabletop for Dorsiflexion /Volar Flexion  stretches x 20 repetitions   - High lighter rolls for intrinsics x 20 repetitions     Therapeutic Activities  to improve functional performance while increasing independence with ADLs and IADLs  x   10   minutes:    - Wrist wheel for Supination / Pronation stretch x 10 repetitions  - Wrist roller coaster for Active Range of Motion  of wrist in all planes x 5 repetitions   - [place] Slot with (10) LARGE washers and  (10) pennies ( 5 in hand) for Fine Motor Coordination x 1 repetitions    - RED Theraputty for rolling x 20 repetitions   - YELLOW digiflex for isolated x 10 repetitions;  RED digiflex for composite " "digital flexion x 20 repetitions       Patient provided with and educated on hook and loop Velcro on tongue depressor to decrease scar hypersensitivity.  Patient demo understanding of above.         -NP = Not Performed     Initiate in future therapy sessions:      - Mini colored pegboard for Fine Motor Coordination and in-hand manipulation  (5 in-hand) x 20 pegs    - Joint blocking and reverse joint blocking for Proximal Interphalangeal joints, and Distal Interphalangeal Joints for Extension /Flexion  x 10 repetitions     - Cotton balls thumb tip to base of 5th digit x 2 repetitions x 10 balls  - Colored bead Unlacing with thumb to  base of 5th digit x 10 beads  - Colored bead Unlacing with 2nd-5th digit tips to palm x 20 beads       -  ###Progressive Hand Gripper (black spring) for composite  x 20 repetitions   - ### Theraputty for pulling, PVC for "cookie cutting" and medicine top x 10  Repetitions    - Wooden pegboard with ### clothespins with 3PT pinch x 2 repetitions   - YELLOW digiweb for composite digital Extension and  intrinsics x 20 repetitions     - Low load prolonged stretches for Dorsiflexion /Volar Flexion with ### leg weight  ( on wedge) x 30 seconds each x 2 repetitions     - ### Flexbar for Supination /Pronation  and Dorsiflexion /Volar Flexion x 20 repetitions    - ### Wrist exerstick in standing for Dorsiflexion / Volar Flexion  x 3 repetitions                Assessment     Patient will continue to benefit from skilled Occupational Therapy intervention to increase functional abilities, range of motion, and strength and pain control.  Patient. demonstrated proper understanding of each exercise.  Patient continues to require verbal and tactile cues for throughout therapy session to maintain position and prevent compensation.   Patient continues to be limited in functional and leisurely pursuits. Pain limits patient's participation in activities of daily living.  Patient is not able to carryout " "necessary vocational tasks.   Patient's spiritual, cultural and educational needs considered and patient agreeable to plan of care and goals.  Patient is making good progress towards established goals.  Patient tolerated exercises / activities within pain tolerance.   Reviewed Home Exercise Program with patient., see EPIC under "patient instructions" for provided exercises, activity modifications and limitations, modalities for home pain management.  Patient. demo understanding of above.    Patient. tolerated  Fluidotherapy  with no irritation.      New/Revised Goals: Continue Plan Of Care   Goals:  1)   Patient to be Independent with Home exercise program and modalities for pain management by 1 week. (MET)  2)   Patient will report   4/10 pain on average with activity to assist with exercises by 6-8 weeks.  ( In Progress)   3)   Patient will increase range of Motion by 3-5 degrees to increase functional use for activities of daily living by 6-8 weeks.  ( In Progress)   4)  Patient will decrease edema by 0.1-0.3 millimeters  to increase joint mobility /flexibility by 6-8 weeks.   ( In Progress)     Plan      Continue 2x week ( depending on work schedule) during the 90 day certification period   08/08/2023   to 11/08/2023   with established Plan Of Care in pursuit of Occupational Therapy goals.      Debbie Aguilar, OT      "

## 2023-08-18 ENCOUNTER — CLINICAL SUPPORT (OUTPATIENT)
Dept: REHABILITATION | Facility: HOSPITAL | Age: 68
End: 2023-08-18
Payer: COMMERCIAL

## 2023-08-18 DIAGNOSIS — M25.531 RIGHT WRIST PAIN: Primary | ICD-10-CM

## 2023-08-18 PROCEDURE — 97022 WHIRLPOOL THERAPY: CPT | Mod: PN

## 2023-08-18 PROCEDURE — 97110 THERAPEUTIC EXERCISES: CPT | Mod: PN

## 2023-08-18 PROCEDURE — 97530 THERAPEUTIC ACTIVITIES: CPT | Mod: PN

## 2023-08-18 PROCEDURE — 97140 MANUAL THERAPY 1/> REGIONS: CPT | Mod: PN

## 2023-08-21 ENCOUNTER — TELEPHONE (OUTPATIENT)
Dept: GASTROENTEROLOGY | Facility: CLINIC | Age: 68
End: 2023-08-21
Payer: COMMERCIAL

## 2023-08-21 NOTE — PROGRESS NOTES
"                                  Occupational Therapy Daily Treatment Note       Date: 8/23/2023  Name: Gaby Donohue  [facility] Number: [id number]    Therapy Diagnosis: Z98.890,Z87.81 (ICD-10-CM) - Status post open reduction and internal fixation (ORIF) of fracture, S52.531D (ICD-10-CM) - Closed Colles' fracture of right radius with routine healing  Encounter Diagnosis   Name Primary?    Right wrist pain Yes     Physician: Tu Mcfarlane,*    Physician Orders: Z98.890,Z87.81 (ICD-10-CM) - Status post open reduction and internal fixation (ORIF) of fracture, S52.531D (ICD-10-CM) - Closed Colles' fracture of right radius with routine healing, brace as needed, no heavy lifting; evaluate and treat  Medical Diagnosis: Z98.890,Z87.81 (ICD-10-CM) - Status post open reduction and internal fixation (ORIF) of fracture, S52.531D (ICD-10-CM) - Closed Colles' fracture of right radius with routine healing; (Right) wrist pain  Surgical Procedure and Date: status post ORIF of (Right) wrist, 06/26/2023    Insurance Authorization Period Expiration: 08/08/2023-12/29/2023  Plan of Care Certification Period: 08/08/2023-11/08/2023  Date of Return to MD: as needed    Evaluation FOTO: 08/08/2023 = 49%     Visit # / Visits authorized: 2 / 20  Time In: 1:45  Time Out: 2:25  Total Billable Time: 40  minutes    Precautions:  Standard; skin cancer;  brace as needed; no heavy lifting       Post Op: 06/26/2023 = 8 weeks, 0 days      Subjective     Patient reports: "I find that I am really stiff in the morning.  I think my hand is getting better, and stronger."     Pain: 2-3/10   Location of pain: (Right) wrist      Objective    Patient seen by Occupational Therapy this session. Tx consisted of:      Supervised modalities after being cleared for contradictions  x  10   minutes:     -Fluidotherapy to  (Right)   hand to increase blood flow, circulation, tissue elasticity, desensitization, sensory re-education and for pain management  x 10 " "minutes.      Manual therapy techniques to increase joint mobilization /// scar massage/// soft tissue mobilization   x   8 minutes:     -Scar Massage to volar aspect of (Right) wrist region around incision site  with  mini therapeutic vibrator // mini dowel massager  to decrease dense scar adhesions and improve tensile glide  x 2 minute  -Manual Therapy techniques to (Right) wrist and thumb  including joint mobilizations, stretching, and Passive Range of Motion to increase joint mobility, range of motion  and for pain management  x 5 minutes   -Soft Tissue Mobilization to (Right) hand into wrist and forearm from distal to proximal to decrease edema and increase range of motion and functional abilities  x  1 minute      Therapeutic Exercises to improve functional performance while increasing strength, endurance, range of motion,  and flexibility  x   10  minutes:      - Active Assistive Range of Motion for isolated and composite digital extension / flexion of thumb and wrist in all planes x 5   Repetitions   - Velcro checkerboard (on wedge) with "dart throwing" pattern x 1 repetition  -NP  - 0# Dorsiflexion/Volar flexion (Supination /Pronation ), Radial Deviation / Ulnar Deviation x 10 repetitions  - 0# dowel for Supination /Pronation  x 10 repetitions    - Weighted ball on tabletop for Dorsiflexion /Volar Flexion  stretches x 10 repetitions   - High lighter rolls for intrinsics x 20 repetitions -NP  - YELLOW digiflex for isolated x 10 repetitions;  RED digiflex for composite digital flexion x 20 repetitions         Therapeutic Activities  to improve functional performance while increasing independence with ADLs and IADLs  x   12   minutes:    - Wrist wheel for Supination / Pronation stretch x 10 repetitions  - Wrist roller coaster for Active Range of Motion  of wrist in all planes x 5 repetitions   - Mini colored pegboard for Fine Motor Coordination and in-hand manipulation  (5 in-hand) x 20 pegs    - RED " "Theraputty for rolling x 20 repetitions   - RED Theraputty for composite  x 10 repetitions   - RED Theraputty with PVC for "cookie cutting" and medicine top to simulate opening containers x 10  Repetitions               -NP = Not Performed     Initiate in future therapy sessions:      -  ###Progressive Hand Gripper (black spring) for composite  x 20 repetitions   - ### Theraputty for pulling   - Wooden pegboard with ### clothespins with 3PT pinch x 2 repetitions   - YELLOW digiweb for composite digital Extension and  intrinsics x 20 repetitions     - Low load prolonged stretches for Dorsiflexion /Volar Flexion with ### leg weight  ( on wedge) x 30 seconds each x 2 repetitions     - ### Flexbar for Supination /Pronation  and Dorsiflexion /Volar Flexion x 20 repetitions    - ### Wrist exerstick in standing for Dorsiflexion / Volar Flexion  x 3 repetitions                Assessment     Patient will continue to benefit from skilled Occupational Therapy intervention to increase functional abilities, range of motion, and strength and pain control.  Patient. demonstrated proper understanding of each exercise.  Patient continues to require verbal and tactile cues for throughout therapy session to maintain position and prevent compensation.   Patient continues to be limited in functional and leisurely pursuits. Pain limits patient's participation in activities of daily living.  Patient is not able to carryout necessary vocational tasks.   Patient's spiritual, cultural and educational needs considered and patient agreeable to plan of care and goals.  Patient is making good progress towards established goals.  Patient tolerated exercises / activities within pain tolerance.   Reviewed Home Exercise Program with patient., see EPIC under "patient instructions" for provided exercises, activity modifications and limitations, modalities for home pain management.  Patient. demo understanding of above.    Patient. tolerated  " "Fluidotherapy  with no irritation.     Patient tolerated addition of  Theraputty for composite  and , PVC for "cookie cutting" and medicine top,  mini colored pegboard for Fine Motor Coordination and in-hand manipulation with no reported pain.      New/Revised Goals: Continue Plan Of Care   Goals:  1)   Patient to be Independent with Home exercise program and modalities for pain management by 1 week. (MET)  2)   Patient will report   4/10 pain on average with activity to assist with exercises by 6-8 weeks.  ( In Progress)   3)   Patient will increase range of Motion by 3-5 degrees to increase functional use for activities of daily living by 6-8 weeks.  ( In Progress)   4)  Patient will decrease edema by 0.1-0.3 millimeters  to increase joint mobility /flexibility by 6-8 weeks.   ( In Progress)     Plan      Continue 2x week ( depending on work schedule) during the 90 day certification period   08/08/2023   to 11/08/2023   with established Plan Of Care in pursuit of Occupational Therapy goals.      Debbie Aguilar, OT      "

## 2023-08-21 NOTE — TELEPHONE ENCOUNTER
----- Message from Kendall Diamond sent at 8/21/2023  9:29 AM CDT -----  Contact: pt at  [phone]  Type: Needs Medical Advice  Who Called:  pt  Best Call Back Number: [phone]  Additional Information: pt is calling the office requesting a call back from the nurse with recommendations for a medications for her stomach.

## 2023-08-21 NOTE — TELEPHONE ENCOUNTER
Call placed to Ms. Gaby in regards to message received. Questions answered in regards to her colonoscopy on 9/1, and informed her that she can take miraLAx daily for her constipation. She verbalized understanding. No further issue noted.

## 2023-08-23 ENCOUNTER — CLINICAL SUPPORT (OUTPATIENT)
Dept: REHABILITATION | Facility: HOSPITAL | Age: 68
End: 2023-08-23
Payer: COMMERCIAL

## 2023-08-23 DIAGNOSIS — M25.531 RIGHT WRIST PAIN: Primary | ICD-10-CM

## 2023-08-23 PROCEDURE — 97022 WHIRLPOOL THERAPY: CPT | Mod: PN

## 2023-08-23 PROCEDURE — 97530 THERAPEUTIC ACTIVITIES: CPT | Mod: PN

## 2023-08-23 PROCEDURE — 97140 MANUAL THERAPY 1/> REGIONS: CPT | Mod: PN

## 2023-08-23 PROCEDURE — 97110 THERAPEUTIC EXERCISES: CPT | Mod: PN

## 2023-08-23 NOTE — PROGRESS NOTES
"                                  Occupational Therapy Daily Treatment Note       Date: 8/29/2023  Name: Gaby Donohue  [facility] Number: [id number]    Therapy Diagnosis: Z98.890,Z87.81 (ICD-10-CM) - Status post open reduction and internal fixation (ORIF) of fracture, S52.531D (ICD-10-CM) - Closed Colles' fracture of right radius with routine healing  Encounter Diagnosis   Name Primary?    Right wrist pain Yes     Physician: Tu Mcfarlane,*    Physician Orders: Z98.890,Z87.81 (ICD-10-CM) - Status post open reduction and internal fixation (ORIF) of fracture, S52.531D (ICD-10-CM) - Closed Colles' fracture of right radius with routine healing, brace as needed, no heavy lifting; evaluate and treat  Medical Diagnosis: Z98.890,Z87.81 (ICD-10-CM) - Status post open reduction and internal fixation (ORIF) of fracture, S52.531D (ICD-10-CM) - Closed Colles' fracture of right radius with routine healing; (Right) wrist pain  Surgical Procedure and Date: status post ORIF of (Right) wrist, 06/26/2023    Insurance Authorization Period Expiration: 08/08/2023-12/29/2023  Plan of Care Certification Period: 08/08/2023-11/08/2023  Date of Return to MD: as needed    Evaluation FOTO: 08/08/2023 = 49%     Visit # / Visits authorized: 3 / 20  Time In: 8:30  Time Out: 9:15   Total Billable Time: 40  minutes    Precautions:  Standard; skin cancer;  brace as needed; no heavy lifting       Post Op: 06/26/2023 = 8 weeks, 6 days      Subjective     Patient reports: "I was able to find the silicone scar pads at the [facility].  I have been wearing them but they don't seem as thick as the one you gave me.  I am still having difficulty using the knife to prep dinner, turning a door knob or key."      Pain: 3/10   Location of pain: (Right) wrist      Objective    Patient seen by Occupational Therapy this session. Tx consisted of:      Supervised modalities after being cleared for contradictions  x  10   minutes:     -Fluidotherapy to  (Right)   " "hand to increase blood flow, circulation, tissue elasticity, desensitization, sensory re-education and for pain management  x 10 minutes.      Manual therapy techniques to increase joint mobilization /// scar massage/// soft tissue mobilization   x   8 minutes:     -Scar Massage to volar aspect of (Right) wrist region around incision site  with  mini therapeutic vibrator // mini dowel massager  to decrease dense scar adhesions and improve tensile glide  x 2 minute  -Manual Therapy techniques to (Right) wrist and thumb  including joint mobilizations, stretching, and Passive Range of Motion to increase joint mobility, range of motion  and for pain management  x 5 minutes   -Soft Tissue Mobilization to (Right) hand into wrist and forearm from distal to proximal to decrease edema and increase range of motion and functional abilities  x  1 minute      Therapeutic Exercises to improve functional performance while increasing strength, endurance, range of motion,  and flexibility  x   10  minutes:      - Active Assistive Range of Motion for isolated and composite digital extension / flexion of thumb and wrist in all planes x 5   Repetitions   - Velcro checkerboard (on wedge) with "dart throwing" pattern x 1 repetition  -NP  - 0# Dorsiflexion/Volar flexion (Supination /Pronation ), Radial Deviation / Ulnar Deviation x 10 repetitions  - 0# dowel for Supination /Pronation  x 10 repetitions    - Weighted ball on tabletop for Dorsiflexion /Volar Flexion  stretches x 10 repetitions   - High lighter rolls for intrinsics x 20 repetitions -NP  - YELLOW digiflex for isolated x 10 repetitions;  RED digiflex for composite digital flexion x 20 repetitions   - Low load prolonged stretches for Dorsiflexion /Volar Flexion with 2# leg weight  ( on edge of table) x 30 seconds each x 2 repetitions        Therapeutic Activities  to improve functional performance while increasing independence with ADLs and IADLs  x   12   minutes:    - Wrist " "wheel for Supination / Pronation stretch x 10 repetitions  - Wrist roller coaster for Active Range of Motion  of wrist in all planes x 5 repetitions   - Mini colored pegboard for Fine Motor Coordination and in-hand manipulation  (5 in-hand) x 20 pegs    - RED Theraputty for rolling x 20 repetitions    - RED Theraputty for composite  x 10 repetitions   - RED Theraputty with PVC for "cookie cutting" and medicine top to simulate opening containers x 10  Repetitions               -NP = Not Performed     Initiate in future therapy sessions:      -  ###Progressive Hand Gripper (black spring) for composite  x 20 repetitions   - ### Theraputty for pulling   - Wooden pegboard with ### clothespins with 3PT pinch x 2 repetitions   - YELLOW digiweb for composite digital Extension and  intrinsics x 20 repetitions        - ### Flexbar for Supination /Pronation  and Dorsiflexion /Volar Flexion x 20 repetitions    - ### Wrist exerstick in standing for Dorsiflexion / Volar Flexion  x 3 repetitions                Assessment     Patient will continue to benefit from skilled Occupational Therapy intervention to increase functional abilities, range of motion, and strength and pain control.  Patient. demonstrated proper understanding of each exercise.  Patient continues to require verbal and tactile cues for throughout therapy session to maintain position and prevent compensation.   Patient continues to be limited in functional and leisurely pursuits. Pain limits patient's participation in activities of daily living.  Patient is not able to carryout necessary vocational tasks.   Patient's spiritual, cultural and educational needs considered and patient agreeable to plan of care and goals.  Patient is making good progress towards established goals.  Patient tolerated exercises / activities within pain tolerance.   Reviewed Home Exercise Program with patient., see EPIC under "patient instructions" for provided exercises, activity " modifications and limitations, modalities for home pain management.  Patient. demo understanding of above.    Patient. tolerated  Fluidotherapy  with no irritation.     Patient tolerated addition of Low load prolonged stretches for Dorsiflexion /Volar Flexion with tolerable stretching pain reported.   Patient continues to demo limited coordination during mini pegboard while dropping pegs several times.     New/Revised Goals: Continue Plan Of Care   Goals:  1)   Patient to be Independent with Home exercise program and modalities for pain management by 1 week. (MET)  2)   Patient will report   4/10 pain on average with activity to assist with exercises by 6-8 weeks.  ( In Progress)   3)   Patient will increase range of Motion by 3-5 degrees to increase functional use for activities of daily living by 6-8 weeks.  ( In Progress)   4)  Patient will decrease edema by 0.1-0.3 millimeters  to increase joint mobility /flexibility by 6-8 weeks.   ( In Progress)     Plan      Continue 2x week ( depending on work schedule) during the 90 day certification period   08/08/2023   to 11/08/2023   with established Plan Of Care in pursuit of Occupational Therapy goals.      Debbie Aguilar, OT

## 2023-08-28 ENCOUNTER — TELEPHONE (OUTPATIENT)
Dept: GASTROENTEROLOGY | Facility: CLINIC | Age: 68
End: 2023-08-28
Payer: COMMERCIAL

## 2023-08-28 NOTE — TELEPHONE ENCOUNTER
----- Message from Tracy Dyer sent at 8/28/2023 11:34 AM CDT -----  Contact: patient  Type:  Needs Medical Advice    Who Called: patient     Would the patient rather a call back or a response via [facility]? Call     Best Call Back Number: [phone] (home)      Additional Information: Patient would like to speak with the nurse in regards to questions she has about the colonoscopy prep.     Please call to advise

## 2023-08-28 NOTE — TELEPHONE ENCOUNTER
Call placed to Ms. Donohue in regards to message received. Questions answered on prep. She verbalized understanding. No further issues noted.

## 2023-08-29 ENCOUNTER — CLINICAL SUPPORT (OUTPATIENT)
Dept: REHABILITATION | Facility: HOSPITAL | Age: 68
End: 2023-08-29
Payer: COMMERCIAL

## 2023-08-29 DIAGNOSIS — M25.531 RIGHT WRIST PAIN: Primary | ICD-10-CM

## 2023-08-29 PROCEDURE — 97022 WHIRLPOOL THERAPY: CPT | Mod: PN

## 2023-08-29 PROCEDURE — 97140 MANUAL THERAPY 1/> REGIONS: CPT | Mod: PN

## 2023-08-29 PROCEDURE — 97530 THERAPEUTIC ACTIVITIES: CPT | Mod: PN

## 2023-08-29 PROCEDURE — 97110 THERAPEUTIC EXERCISES: CPT | Mod: PN

## 2023-08-29 NOTE — PROGRESS NOTES
"                                  Occupational Therapy Daily Treatment Note       Date: 9/5/2023  Name: Gaby Donohue  [facility] Number: [id number]    Therapy Diagnosis: Z98.890,Z87.81 (ICD-10-CM) - Status post open reduction and internal fixation (ORIF) of fracture, S52.531D (ICD-10-CM) - Closed Colles' fracture of right radius with routine healing  Encounter Diagnosis   Name Primary?    Right wrist pain Yes     Physician: Tu Mcfarlane,*    Physician Orders: Z98.890,Z87.81 (ICD-10-CM) - Status post open reduction and internal fixation (ORIF) of fracture, S52.531D (ICD-10-CM) - Closed Colles' fracture of right radius with routine healing, brace as needed, no heavy lifting; evaluate and treat  Medical Diagnosis: Z98.890,Z87.81 (ICD-10-CM) - Status post open reduction and internal fixation (ORIF) of fracture, S52.531D (ICD-10-CM) - Closed Colles' fracture of right radius with routine healing; (Right) wrist pain  Surgical Procedure and Date: status post ORIF of (Right) wrist, 06/26/2023    Insurance Authorization Period Expiration: 08/08/2023-12/29/2023  Plan of Care Certification Period: 08/08/2023-11/08/2023  Date of Return to MD: as needed    Evaluation FOTO: 08/08/2023 = 49%     Visit # / Visits authorized: 4 / 20  Time In: 2:15  Time Out: 3:00   Total Billable Time: 40  minutes    Precautions:  Standard; skin cancer;  brace as needed; no heavy lifting       Post Op: 06/26/2023 = 9 weeks, 6 days      Subjective     Patient reports: "I was feeling my hand more yesterday I think it had to do with the weather.  I am feeling better today.  I am still having trouble opening containers and turning door knobs."     Pain: 2/10   Location of pain: (Right) wrist      Objective    Patient seen by Occupational Therapy this session. Tx consisted of:      Supervised modalities after being cleared for contradictions  x  10   minutes:     -Fluidotherapy to  (Right)   hand to increase blood flow, circulation, tissue " "elasticity, desensitization, sensory re-education and for pain management  x 10 minutes.      Manual therapy techniques to increase joint mobilization /// scar massage/// soft tissue mobilization   x   8 minutes:     -Scar Massage to volar aspect of (Right) wrist region around incision site  with  mini therapeutic vibrator // mini dowel massager  to decrease dense scar adhesions and improve tensile glide  x 2 minute  -Manual Therapy techniques to (Right) wrist and thumb  including joint mobilizations, stretching, and Passive Range of Motion to increase joint mobility, range of motion  and for pain management  x 5 minutes   -Soft Tissue Mobilization to (Right) hand into wrist and forearm from distal to proximal to decrease edema and increase range of motion and functional abilities  x  1 minute      Therapeutic Exercises to improve functional performance while increasing strength, endurance, range of motion,  and flexibility  x   10  minutes:      - Active Assistive Range of Motion for isolated and composite digital extension / flexion of thumb and wrist in all planes x 5   Repetitions   - Velcro checkerboard (on wedge) with "dart throwing" pattern x 1 repetition  -NP  - 1# Dorsiflexion/Volar flexion (Supination /Pronation ), Radial Deviation / Ulnar Deviation x 10 repetitions  - 0.5# dowel for Supination /Pronation  x 10 repetitions    - Weighted ball on tabletop for Dorsiflexion /Volar Flexion  stretches x 10 repetitions   - High lighter rolls for intrinsics x 20 repetitions -NP  - YELLOW digiflex for isolated x 10 repetitions;  RED digiflex for composite digital flexion x 20 repetitions   - Low load prolonged stretches for Dorsiflexion /Volar Flexion with 2# leg weight  ( on edge of table) x 30 seconds each x 2 repetitions        Therapeutic Activities  to improve functional performance while increasing independence with ADLs and IADLs  x   12   minutes:    - Wrist wheel for Supination / Pronation stretch x 10 " "repetitions  - Wrist roller coaster for Active Range of Motion  of wrist in all planes x 5 repetitions   - Mini colored pegboard for Fine Motor Coordination and in-hand manipulation  (5 in-hand) x 20 pegs    - RED Theraputty for rolling x 20 repetitions    - RED Theraputty for composite  x 10 repetitions   - RED Theraputty with PVC for "cookie cutting" and medicine top to simulate opening containers x 10  Repetitions               -NP = Not Performed     Initiate in future therapy sessions:      -  ###Progressive Hand Gripper (black spring) for composite  x 20 repetitions   - ### Theraputty for pulling   - Wooden pegboard with ### clothespins with 3PT pinch x 2 repetitions   - YELLOW digiweb for composite digital Extension and  intrinsics x 20 repetitions     - ### Flexbar for Supination /Pronation  and Dorsiflexion /Volar Flexion x 20 repetitions    - ### Wrist exerstick in standing for Dorsiflexion / Volar Flexion  x 3 repetitions                Assessment     Patient will continue to benefit from skilled Occupational Therapy intervention to increase functional abilities, range of motion, and strength and pain control.  Patient. demonstrated proper understanding of each exercise.  Patient continues to require verbal and tactile cues for throughout therapy session to maintain position and prevent compensation.   Patient continues to be limited in functional and leisurely pursuits. Pain limits patient's participation in activities of daily living.  Patient is not able to carryout necessary vocational tasks.   Patient's spiritual, cultural and educational needs considered and patient agreeable to plan of care and goals.  Patient is making good progress towards established goals.  Patient tolerated exercises / activities within pain tolerance.   Reviewed Home Exercise Program with patient., see EPIC under "patient instructions" for provided exercises, activity modifications and limitations, modalities for home " pain management.  Patient. demo understanding of above.    Patient. tolerated  Fluidotherapy  with no irritation.     Patient tolerated increase in resistance during Dorsal Flexion, Volar Flexion, Radial Deviation, Ulnar Deviation, and Supination, and Pronation with no reported pain.     New/Revised Goals: Continue Plan Of Care   Goals:  1)   Patient to be Independent with Home exercise program and modalities for pain management by 1 week. (MET)  2)   Patient will report   4/10 pain on average with activity to assist with exercises by 6-8 weeks.  ( In Progress)   3)   Patient will increase range of Motion by 3-5 degrees to increase functional use for activities of daily living by 6-8 weeks.  ( In Progress)   4)  Patient will decrease edema by 0.1-0.3 millimeters  to increase joint mobility /flexibility by 6-8 weeks.   ( In Progress)     Plan      Continue 2x week ( depending on work schedule) during the 90 day certification period   08/08/2023   to 11/08/2023   with established Plan Of Care in pursuit of Occupational Therapy goals.      Debbie Aguilar, OT

## 2023-09-01 ENCOUNTER — ANESTHESIA EVENT (OUTPATIENT)
Dept: ENDOSCOPY | Facility: HOSPITAL | Age: 68
End: 2023-09-01
Payer: COMMERCIAL

## 2023-09-01 ENCOUNTER — ANESTHESIA (OUTPATIENT)
Dept: ENDOSCOPY | Facility: HOSPITAL | Age: 68
End: 2023-09-01
Payer: COMMERCIAL

## 2023-09-01 ENCOUNTER — HOSPITAL ENCOUNTER (OUTPATIENT)
Facility: HOSPITAL | Age: 68
Discharge: HOME | End: 2023-09-01
Attending: INTERNAL MEDICINE | Admitting: FAMILY MEDICINE
Payer: COMMERCIAL

## 2023-09-01 DIAGNOSIS — Z86.010 HISTORY OF COLON POLYPS: ICD-10-CM

## 2023-09-01 PROCEDURE — D9220A PRA ANESTHESIA: Mod: 33,ANES,, | Performed by: ANESTHESIOLOGY

## 2023-09-01 PROCEDURE — 25000003 PHARM REV CODE 250: Performed by: NURSE ANESTHETIST, CERTIFIED REGISTERED

## 2023-09-01 PROCEDURE — 45380 PR COLONOSCOPY,BIOPSY: ICD-10-PCS | Mod: 33,,, | Performed by: INTERNAL MEDICINE

## 2023-09-01 PROCEDURE — D9220A PRA ANESTHESIA: ICD-10-PCS | Mod: 33,CRNA,, | Performed by: NURSE ANESTHETIST, CERTIFIED REGISTERED

## 2023-09-01 PROCEDURE — D9220A PRA ANESTHESIA: ICD-10-PCS | Mod: 33,ANES,, | Performed by: ANESTHESIOLOGY

## 2023-09-01 PROCEDURE — 37000009 HC ANESTHESIA EA ADD 15 MINS: Performed by: INTERNAL MEDICINE

## 2023-09-01 PROCEDURE — 63600175 PHARM REV CODE 636 W HCPCS: Performed by: NURSE ANESTHETIST, CERTIFIED REGISTERED

## 2023-09-01 PROCEDURE — 25000003 PHARM REV CODE 250: Performed by: INTERNAL MEDICINE

## 2023-09-01 PROCEDURE — D9220A PRA ANESTHESIA: Mod: 33,CRNA,, | Performed by: NURSE ANESTHETIST, CERTIFIED REGISTERED

## 2023-09-01 PROCEDURE — 37000008 HC ANESTHESIA 1ST 15 MINUTES: Performed by: INTERNAL MEDICINE

## 2023-09-01 PROCEDURE — 45380 COLONOSCOPY AND BIOPSY: CPT | Mod: 33,,, | Performed by: INTERNAL MEDICINE

## 2023-09-01 PROCEDURE — 27201012 HC FORCEPS, HOT/COLD, DISP: Performed by: INTERNAL MEDICINE

## 2023-09-01 PROCEDURE — 45380 COLONOSCOPY AND BIOPSY: CPT | Mod: PT | Performed by: INTERNAL MEDICINE

## 2023-09-01 RX ORDER — PROPOFOL 10 MG/ML
VIAL (ML) INTRAVENOUS
Status: DISCONTINUED | OUTPATIENT
Start: 2023-09-01 | End: 2023-09-01

## 2023-09-01 RX ORDER — LIDOCAINE HYDROCHLORIDE 20 MG/ML
INJECTION INTRAVENOUS
Status: DISCONTINUED | OUTPATIENT
Start: 2023-09-01 | End: 2023-09-01

## 2023-09-01 RX ORDER — SODIUM CHLORIDE 9 MG/ML
INJECTION, SOLUTION INTRAVENOUS CONTINUOUS
Status: DISCONTINUED | OUTPATIENT
Start: 2023-09-01 | End: 2023-09-01 | Stop reason: HOSPADM

## 2023-09-01 RX ADMIN — PROPOFOL 50 MG: 10 INJECTION, EMULSION INTRAVENOUS at 11:09

## 2023-09-01 RX ADMIN — PROPOFOL 50 MG: 10 INJECTION, EMULSION INTRAVENOUS at 10:09

## 2023-09-01 RX ADMIN — SODIUM CHLORIDE: 9 INJECTION, SOLUTION INTRAVENOUS at 10:09

## 2023-09-01 RX ADMIN — LIDOCAINE HYDROCHLORIDE 100 MG: 20 INJECTION, SOLUTION INTRAVENOUS at 10:09

## 2023-09-01 NOTE — ANESTHESIA PREPROCEDURE EVALUATION
09/01/2023  Gaby Donohue is a 68 y.o., female.      Pre-op Assessment    I have reviewed the Patient Summary Reports.     I have reviewed the Nursing Notes. I have reviewed the NPO Status.   I have reviewed the Medications.     Review of Systems  Anesthesia Hx:  Denies Family Hx of Anesthesia complications.   Denies Personal Hx of Anesthesia complications.   Social:  Former Smoker    Hematology/Oncology:         -- Cancer in past history:   Cardiovascular:   hyperlipidemia ECG has been reviewed.    Hepatic/GI:   Bowel Prep. GERD, well controlled    Endocrine:  Obesity / BMI > 30  Psych:   Psychiatric History depression          Physical Exam  General: Cooperative, Alert and Oriented    Airway:  Mallampati: II   Mouth Opening: Normal  TM Distance: Normal  Tongue: Normal  Neck ROM: Normal ROM    Dental:  Intact    Chest/Lungs:  Normal Respiratory Rate    Heart:  Rate: Normal        Anesthesia Plan  Type of Anesthesia, risks & benefits discussed:    Anesthesia Type: Gen Natural Airway  Intra-op Monitoring Plan: Standard ASA Monitors  Induction:  IV  Informed Consent: Informed consent signed with the Patient and all parties understand the risks and agree with anesthesia plan.  All questions answered.   ASA Score: 2    Ready For Surgery From Anesthesia Perspective.     .        
[[id number]]

## 2023-09-01 NOTE — H&P
[facility] Note    CC: History of large colon polyp    HPI 68 y.o. female presents for surveillance colonoscopy due to large TVA removed in 2021     Past Medical History:   Diagnosis Date    Anxiety     Basal cell carcinoma     scalp and lip    Cancer     skin ca on face    Depression     GERD (gastroesophageal reflux disease)     Hyperlipidemia     Squamous cell carcinoma of skin     Wears glasses        Allergies and Medications reviewed     Review of Systems  General ROS: negative for - chills, fever or weight loss  Cardiovascular ROS: no chest pain or dyspnea on exertion  Gastrointestinal ROS: no blood in stool    Physical Examination  There were no vitals taken for this visit.  General appearance: alert, cooperative, no distress  HENT: Normocephalic, atraumatic, neck symmetrical, no nasal discharge, sclera anicteric   Lungs: clear to auscultation bilaterally, symmetric chest wall expansion bilaterally  Heart: regular rate and rhythm without rub; no displacement of the PMI   Abdomen: soft  Extremities: extremities symmetric; no clubbing, cyanosis, or edema        Labs:  Lab Results   Component Value Date    WBC 5.33 06/23/2023    WBC 5.50 06/23/2023    HGB 13.9 06/23/2023    HGB 13.9 06/23/2023    HCT 42.8 06/23/2023    HCT 41.5 06/23/2023    MCV 91 06/23/2023    MCV 92 06/23/2023     06/23/2023     06/23/2023             Assessment:   68 y.o. female presents for colonoscopy    Plan:  -Proceed to colonoscopy     Adrienne Arbour Carona, MD [facility]  [address], [address]  [place] [address]  Office: [phone]  Fax: [phone]

## 2023-09-01 NOTE — TRANSFER OF CARE
Anesthesia Transfer of Care Note    Patient: Gaby Donohue    Procedure(s) Performed: Procedure(s) (LRB):  COLONOSCOPY (N/A)    Patient location: GI    Anesthesia Type: general    Transport from OR: Transported from OR on room air with adequate spontaneous ventilation    Post pain: adequate analgesia    Post assessment: no apparent anesthetic complications    Post vital signs: stable    Level of consciousness: sedated    Nausea/Vomiting: no nausea/vomiting    Complications: none    Transfer of care protocol was followed      Last vitals:   Visit Vitals  BP (!) 156/75 (BP Location: Left arm, Patient Position: Lying)   Pulse 82   Temp 36.6 °C (97.9 °F) (Skin)   Resp 16   SpO2 98%   Breastfeeding No

## 2023-09-01 NOTE — [employer]OVATION PATIENT INSTRUCTIONS
Discharge Summary/Instructions after an Endoscopic Procedure  Patient Name: Gaby Donohue  Patient MRN: [id number]  Patient YOB: 1955  Friday, September 1, 2023  Yasmeen Goldman MD  Dear patient,  As a result of recent federal legislation (The Federal Cures Act), you may   receive lab or pathology results from your procedure in your [facility]   account before your physician is able to contact you. Your physician or   their representative will relay the results to you with their   recommendations at their soonest availability.  Thank you,  RESTRICTIONS:  During your procedure today, you received medications for sedation.  These   medications may affect your judgment, balance and coordination.  Therefore,   for 24 hours, you have the following restrictions:   - DO NOT drive a car, operate machinery, make legal/financial decisions,   sign important papers or drink alcohol.    ACTIVITY:  Today: no heavy lifting, straining or running due to procedural   sedation/anesthesia.  The following day: return to full activity including work.  DIET:  Eat and drink normally unless instructed otherwise.     TREATMENT FOR COMMON SIDE EFFECTS:  - Mild abdominal pain, nausea, belching, bloating or excessive gas:  rest,   eat lightly and use a heating pad.  - Sore Throat: treat with throat lozenges and/or gargle with warm salt   water.  - Because air was used during the procedure, expelling large amounts of air   from your rectum or belching is normal.  - If a bowel prep was taken, you may not have a bowel movement for 1-3 days.    This is normal.  SYMPTOMS TO WATCH FOR AND REPORT TO YOUR PHYSICIAN:  1. Abdominal pain or bloating, other than gas cramps.  2. Chest pain.  3. Back pain.  4. Signs of infection such as: chills or fever occurring within 24 hours   after the procedure.  5. Rectal bleeding, which would show as bright red, maroon, or black stools.   (A tablespoon of blood from the rectum is not serious,  especially if   hemorrhoids are present.)  6. Vomiting.  7. Weakness or dizziness.  GO DIRECTLY TO THE NEAREST EMERGENCY ROOM IF YOU HAVE ANY OF THE FOLLOWING:      Difficulty breathing              Chills and/or fever over 101 F   Persistent vomiting and/or vomiting blood   Severe abdominal pain   Severe chest pain   Black, tarry stools   Bleeding- more than one tablespoon   Any other symptom or condition that you feel may need urgent attention  Your doctor recommends these additional instructions:  If any biopsies were taken, your doctors clinic will contact you in 1 to 2   weeks with any results.  - Discharge patient to home (with escort).   - Patient has a contact number available for emergencies.  The signs and   symptoms of potential delayed complications were discussed with the   patient.  Return to normal activities tomorrow.  Written discharge   instructions were provided to the patient.   - Resume previous diet.   - Continue present medications.   - Await pathology results.   - Repeat colonoscopy in 3 years for surveillance.   - Return to my office PRN.  For questions, problems or results please call your physician - Yasmeen Goldman MD at Work:  [phone].  [facility] PHONE NUMBER: [phone]  IF A COMPLICATION OR EMERGENCY SITUATION ARISES AND YOU ARE UNABLE TO REACH   YOUR PHYSICIAN - GO DIRECTLY TO THE EMERGENCY ROOM.  Yasmeen Goldman MD  9/1/2023 11:24:13 AM  This report has been verified and signed electronically.  Dear patient,  As a result of recent federal legislation (The Federal Cures Act), you may   receive lab or pathology results from your procedure in your [facility]   account before your physician is able to contact you. Your physician or   their representative will relay the results to you with their   recommendations at their soonest availability.  Thank you,  PROVATION

## 2023-09-01 NOTE — PLAN OF CARE
Vss, yuridia po fluids, denies pain, ambulates easily. IV removed, catheter intact. Discharge instructions provided and states understanding. States ready to go home.  Discharged from facility with family per wheelchair.

## 2023-09-01 NOTE — ANESTHESIA POSTPROCEDURE EVALUATION
Anesthesia Post Evaluation    Patient: Gaby Donohue    Procedure(s) Performed: Procedure(s) (LRB):  COLONOSCOPY (N/A)    Final Anesthesia Type: general      Patient location during evaluation: PACU  Patient participation: Yes- Able to Participate  Level of consciousness: awake and alert  Post-procedure vital signs: reviewed and stable  Pain management: adequate  Airway patency: patent    PONV status at discharge: No PONV  Anesthetic complications: no      Cardiovascular status: hemodynamically stable  Respiratory status: unassisted and room air  Hydration status: euvolemic  Follow-up not needed.          Vitals Value Taken Time   /66 09/01/23 1151     09/01/23 1155   Pulse 74 09/01/23 1153   Resp 16 09/01/23 1125   SpO2 99 % 09/01/23 1153   Vitals shown include unvalidated device data.      No case tracking events are documented in the log.      Pain/Tariq Score: Tariq Score: 10 (9/1/2023 11:40 AM)

## 2023-09-05 ENCOUNTER — CLINICAL SUPPORT (OUTPATIENT)
Dept: REHABILITATION | Facility: HOSPITAL | Age: 68
End: 2023-09-05
Payer: COMMERCIAL

## 2023-09-05 VITALS
TEMPERATURE: 98 F | DIASTOLIC BLOOD PRESSURE: 66 MMHG | SYSTOLIC BLOOD PRESSURE: 161 MMHG | HEART RATE: 78 BPM | RESPIRATION RATE: 16 BRPM | OXYGEN SATURATION: 100 %

## 2023-09-05 DIAGNOSIS — M25.531 RIGHT WRIST PAIN: Primary | ICD-10-CM

## 2023-09-05 PROCEDURE — 97140 MANUAL THERAPY 1/> REGIONS: CPT | Mod: PN

## 2023-09-05 PROCEDURE — 97022 WHIRLPOOL THERAPY: CPT | Mod: PN

## 2023-09-05 PROCEDURE — 97110 THERAPEUTIC EXERCISES: CPT | Mod: PN

## 2023-09-05 PROCEDURE — 97530 THERAPEUTIC ACTIVITIES: CPT | Mod: PN

## 2023-09-05 NOTE — PROGRESS NOTES
"                                  Occupational Therapy Daily Treatment Note       Date: 9/7/2023  Name: Gaby Donohue  [facility] Number: [id number]    Therapy Diagnosis: Z98.890,Z87.81 (ICD-10-CM) - Status post open reduction and internal fixation (ORIF) of fracture, S52.531D (ICD-10-CM) - Closed Colles' fracture of right radius with routine healing  Encounter Diagnosis   Name Primary?    Right wrist pain Yes     Physician: Tu Mcfarlane,*    Physician Orders: Z98.890,Z87.81 (ICD-10-CM) - Status post open reduction and internal fixation (ORIF) of fracture, S52.531D (ICD-10-CM) - Closed Colles' fracture of right radius with routine healing, brace as needed, no heavy lifting; evaluate and treat  Medical Diagnosis: Z98.890,Z87.81 (ICD-10-CM) - Status post open reduction and internal fixation (ORIF) of fracture, S52.531D (ICD-10-CM) - Closed Colles' fracture of right radius with routine healing; (Right) wrist pain  Surgical Procedure and Date: status post ORIF of (Right) wrist, 06/26/2023    Insurance Authorization Period Expiration: 08/08/2023-12/29/2023  Plan of Care Certification Period: 08/08/2023-11/08/2023  Date of Return to MD: as needed    Evaluation FOTO: 08/08/2023 = 49%     Visit # / Visits authorized: 5 / 20  Time In: 1:30  Time Out: 2:15    Total Billable Time: 40  minutes    Precautions:  Standard; skin cancer;  brace as needed; no heavy lifting       Post Op: 06/26/2023 = 10 weeks, 1 days      Subjective     Patient reports: "I am feeling pretty good in my hand today. My scar is a little sensitive."     Pain: 1-2/10   Location of pain: (Right) wrist      Objective    Patient seen by Occupational Therapy this session. Tx consisted of:      Supervised modalities after being cleared for contradictions  x  10   minutes:     -Fluidotherapy to  (Right)   hand to increase blood flow, circulation, tissue elasticity, desensitization, sensory re-education and for pain management  x 10 minutes.      Manual " "therapy techniques to increase joint mobilization /// scar massage/// soft tissue mobilization   x   8 minutes:     -Scar Massage to volar aspect of (Right) wrist region around incision site  with  mini therapeutic vibrator // mini dowel massager  to decrease dense scar adhesions and improve tensile glide  x 2 minute  -Manual Therapy techniques to (Right) wrist and thumb  including joint mobilizations, stretching, and Passive Range of Motion to increase joint mobility, range of motion  and for pain management  x 5 minutes   -Soft Tissue Mobilization to (Right) hand into wrist and forearm from distal to proximal to decrease edema and increase range of motion and functional abilities  x  1 minute      Therapeutic Exercises to improve functional performance while increasing strength, endurance, range of motion,  and flexibility  x   10  minutes:      - Active Assistive Range of Motion for isolated and composite digital extension / flexion of thumb and wrist in all planes x 5   Repetitions   - Velcro checkerboard (on wedge) with "dart throwing" pattern x 1 repetition  -NP  - 1# Dorsiflexion/Volar flexion (Supination /Pronation ), Radial Deviation / Ulnar Deviation x 10 repetitions  - 0.5# dowel for Supination /Pronation  x 10 repetitions    - Weighted ball on tabletop for Dorsiflexion /Volar Flexion  stretches x 10 repetitions   - High lighter rolls for intrinsics x 20 repetitions -NP  - YELLOW digiflex for isolated x 10 repetitions;  RED digiflex for composite digital flexion x 20 repetitions   - Low load prolonged stretches for Dorsiflexion /Volar Flexion with 2# leg weight  (on edge of table) x 30 seconds each x 2 repetitions        Therapeutic Activities  to improve functional performance while increasing independence with ADLs and IADLs  x   12   minutes:      - Wrist roller coaster for Active Range of Motion  of wrist in all planes x 5 repetitions   - Mini colored pegboard for Fine Motor Coordination and in-hand " "manipulation  (5 in-hand) x 20 pegs    - RED Theraputty for rolling x 20 repetitions   - 15# Progressive Hand Gripper (black spring) for composite  x 20 repetitions  - RED Theraputty with PVC for "cookie cutting" and medicine top to simulate opening containers x 10  Repetitions   - Wooden pegboard with RED clothespins with 3PT pinch x 2 repetitions                -NP = Not Performed     Initiate in future therapy sessions:    - Wrist wheel with ### Theraband for Supination / Pronation  x 10 repetitions       - ### Theraputty for pulling   - YELLOW digiweb for composite digital Extension and  intrinsics x 20 repetitions     - ### Flexbar for Supination /Pronation  and Dorsiflexion /Volar Flexion x 20 repetitions    - ### Wrist exerstick in standing for Dorsiflexion / Volar Flexion  x 3 repetitions                Assessment     Patient will continue to benefit from skilled Occupational Therapy intervention to increase functional abilities, range of motion, and strength and pain control.  Patient. demonstrated proper understanding of each exercise.  Patient continues to require verbal and tactile cues for throughout therapy session to maintain position and prevent compensation.   Patient continues to be limited in functional and leisurely pursuits. Pain limits patient's participation in activities of daily living.  Patient is not able to carryout necessary vocational tasks.   Patient's spiritual, cultural and educational needs considered and patient agreeable to plan of care and goals.  Patient is making good progress towards established goals.  Patient tolerated exercises / activities within pain tolerance.   Reviewed Home Exercise Program with patient., see EPIC under "patient instructions" for provided exercises, activity modifications and limitations, modalities for home pain management.  Patient. demo understanding of above.    Patient. tolerated  Fluidotherapy  with no irritation.     Patient tolerated " addition of Progressive Hand Gripper for composite ; wooden pegboard with clothespins for 3 Point Pinch  with no reported pain.     New/Revised Goals: Continue Plan Of Care   Goals:  1)   Patient to be Independent with Home exercise program and modalities for pain management by 1 week. (MET)  2)   Patient will report   4/10 pain on average with activity to assist with exercises by 6-8 weeks.  ( In Progress)   3)   Patient will increase range of Motion by 3-5 degrees to increase functional use for activities of daily living by 6-8 weeks.  ( In Progress)   4)  Patient will decrease edema by 0.1-0.3 millimeters  to increase joint mobility /flexibility by 6-8 weeks.   ( In Progress)     Plan      Continue 2x week ( depending on work schedule) during the 90 day certification period   08/08/2023   to 11/08/2023   with established Plan Of Care in pursuit of Occupational Therapy goals.      Debbie Aguilar, OT

## 2023-09-07 ENCOUNTER — CLINICAL SUPPORT (OUTPATIENT)
Dept: REHABILITATION | Facility: HOSPITAL | Age: 68
End: 2023-09-07
Payer: COMMERCIAL

## 2023-09-07 DIAGNOSIS — M25.531 RIGHT WRIST PAIN: Primary | ICD-10-CM

## 2023-09-07 PROCEDURE — 97140 MANUAL THERAPY 1/> REGIONS: CPT | Mod: PN

## 2023-09-07 PROCEDURE — 97110 THERAPEUTIC EXERCISES: CPT | Mod: PN

## 2023-09-07 PROCEDURE — 97530 THERAPEUTIC ACTIVITIES: CPT | Mod: PN

## 2023-09-07 PROCEDURE — 97022 WHIRLPOOL THERAPY: CPT | Mod: PN

## 2023-09-12 NOTE — PROGRESS NOTES
"                                  Occupational Therapy Progress Note       Date: 9/14/2023  Name: Gaby Donohue  [facility] Number: [id number]    Therapy Diagnosis: Z98.890,Z87.81 (ICD-10-CM) - Status post open reduction and internal fixation (ORIF) of fracture, S52.531D (ICD-10-CM) - Closed Colles' fracture of right radius with routine healing  Encounter Diagnosis   Name Primary?    Right wrist pain Yes     Physician: Tu Mcfarlane,*    Physician Orders: Z98.890,Z87.81 (ICD-10-CM) - Status post open reduction and internal fixation (ORIF) of fracture, S52.531D (ICD-10-CM) - Closed Colles' fracture of right radius with routine healing, brace as needed, no heavy lifting; evaluate and treat  Medical Diagnosis: Z98.890,Z87.81 (ICD-10-CM) - Status post open reduction and internal fixation (ORIF) of fracture, S52.531D (ICD-10-CM) - Closed Colles' fracture of right radius with routine healing; (Right) wrist pain  Surgical Procedure and Date: status post ORIF of (Right) wrist, 06/26/2023    Insurance Authorization Period Expiration: 08/08/2023-12/29/2023  Plan of Care Certification Period: 08/08/2023-11/08/2023  Date of Return to MD: as needed    Evaluation FOTO: 08/08/2023 = 49%  Reassessment FOTO: 09/14/2023 = 40%     Visit # / Visits authorized: 6 / 20  Time In: 10:00  Time Out: 10:45    Total Billable Time: 40  minutes    Precautions:  Standard; skin cancer;  brace as needed; no heavy lifting       Post Op: 06/26/2023 = 11 weeks, 1 days      Subjective     Patient reports: "I am doing ok today.  I still have trouble opening containers."     Pain: 2/10   Location of pain: (Right) wrist      Objective    Patient seen by Occupational Therapy this session. Tx consisted of:      Supervised modalities after being cleared for contradictions  x  10   minutes:     -Fluidotherapy to  (Right)   hand to increase blood flow, circulation, tissue elasticity, desensitization, sensory re-education and for pain management  x 10 " "minutes.      Manual therapy techniques to increase joint mobilization /// scar massage/// soft tissue mobilization   x   8 minutes:     -Scar Massage to volar aspect of (Right) wrist region around incision site  with  mini therapeutic vibrator // mini dowel massager  to decrease dense scar adhesions and improve tensile glide  x 2 minute  -Manual Therapy techniques to (Right) wrist and thumb  including joint mobilizations, stretching, and Passive Range of Motion to increase joint mobility, range of motion  and for pain management  x 5 minutes   -Soft Tissue Mobilization to (Right) hand into wrist and forearm from distal to proximal to decrease edema and increase range of motion and functional abilities  x  1 minute      Therapeutic Exercises to improve functional performance while increasing strength, endurance, range of motion,  and flexibility  x   10  minutes:      - Active Assistive Range of Motion for isolated and composite digital extension / flexion of thumb and wrist in all planes x 5   Repetitions   - Velcro checkerboard (on wedge) with "dart throwing" pattern x 1 repetition  -NP  - 1# Dorsiflexion/Volar flexion (Supination /Pronation ), Radial Deviation / Ulnar Deviation x 10 repetitions  - 0.5# dowel for Supination /Pronation  x 10 repetitions    - Weighted ball on tabletop for Dorsiflexion /Volar Flexion  stretches x 10 repetitions   - High lighter rolls for intrinsics x 20 repetitions -NP  - YELLOW digiflex for isolated x 10 repetitions;  RED digiflex for composite digital flexion x 20 repetitions   - Low load prolonged stretches for Dorsiflexion /Volar Flexion with 2# leg weight  (on edge of table) x 30 seconds each x 2 repetitions  -NP        Therapeutic Activities  to improve functional performance while increasing independence with ADLs and IADLs  x   12   minutes:      - Wrist roller coaster for Active Range of Motion  of wrist in all planes x 5 repetitions   - Mini colored pegboard for Fine Motor " "Coordination and in-hand manipulation  (5 in-hand) x 20 pegs    - RED Theraputty for rolling x 20 repetitions   - 15# Progressive Hand Gripper (black spring) for composite  x 20 repetitions  - RED Theraputty with PVC for "cookie cutting" and medicine top to simulate opening containers x 10  Repetitions   - Wooden pegboard with RED clothespins with 3PT pinch x 2 repetitions                -NP = Not Performed     Initiate in future therapy sessions:    - Wrist wheel with ### Theraband for Supination / Pronation  x 10 repetitions       - ### Theraputty for pulling   - YELLOW digiweb for composite digital Extension and  intrinsics x 20 repetitions     - ### Flexbar for Supination /Pronation  and Dorsiflexion /Volar Flexion x 20 repetitions    - ### Wrist exerstick in standing for Dorsiflexion / Volar Flexion  x 3 repetitions       Patient reassessed as follows:     Edema:   centimeters (Right) / (Left)  Metacarpals:  19.0 (-0.8)   / 18.9  Proximal Wrist Crease:  18.3 (-0.7)   / 16.7           Active Range of Motion: hand   ( measured with wrist goni from digital tip to Distal Palmar Crease) cm  (Right)    II:  0.0  (+3.2)   III:  0.0  (+2.4)   IV: 0.0  (+1.9)   V: 0.0     Opposition:  1.0 (+1.5)   Comments: (Measured with wrist goni from thumb tip to base of 5th metacarpal) centimeters                                                       Active Range of Motion: wrist                         (Right)   //  (Left)  Dorsal Flexion /Volar Flexion : 55 (+5)  / 65  (+10)   // 65/80  Radial Deviation /Ulnar Deviation:  8 (+3)  / 16 (+4)   // 13/30  Supination / Pronation: 70 (+10)  / 70 (+5)   // 80/85     Passive Range of Motion: wrist         ( submaximally, within pain tolerance)                    (Right)    Dorsal Flexion /Volar Flexion : 65 (+5)  / 70 (+5)   Radial Deviation /Ulnar Deviation:  10 (+2)  / 20 (+2)   Supination / Pronation:  75 (+5)  / 80 (+5)        Comments: fracture (Left) wrist approx 9 years ago   " "  Manual Muscle Test:  Wrist    ( submaximally, within pain tolerance)   (Right)    Dorsal Flexion:  3+/5  Volar Flexion:  3+/5  Radial Deviation:  3/5  Ulnar Deviation:  3+/5         Strength: (DORIE Dynamometer in pounds),Position II    ( submaximally, within pain tolerance)   (Right):   25  (Left):     45     Pinch Strength: (Pinch Gauge in pounds)    ( submaximally, within pain tolerance)                       (Right) /  (Left)  Lateral Pinch:  8  /  12  3 Point Pinch:  7 / 13  2 Point Pinch:  4  / 10         Assessment     Patient will continue to benefit from skilled Occupational Therapy intervention to increase functional abilities, range of motion, and strength and pain control.  Patient. demonstrated proper understanding of each exercise.  Patient continues to require verbal and tactile cues for throughout therapy session to maintain position and prevent compensation.   Patient continues to be limited in functional and leisurely pursuits. Pain limits patient's participation in activities of daily living.  Patient is not able to carryout necessary vocational tasks.   Patient's spiritual, cultural and educational needs considered and patient agreeable to plan of care and goals.  Patient is making good progress towards established goals.  Patient tolerated exercises / activities within pain tolerance.   Reviewed Home Exercise Program with patient., see EPIC under "patient instructions" for provided exercises, activity modifications and limitations, modalities for home pain management.  Patient. demo understanding of above.    Patient. tolerated  Fluidotherapy  with no irritation.     Patient demo decreased edema in metacarpals, and Proximal Wrist Crease;  increased Active range of motion for (Right) thumb opposition, 2nd-4th digit composite flexion;  increased Active range of motion and Passive range of motion  for (Right) wrist Dorsal Flexion, Volar Flexion, Radial Deviation, Ulnar Deviation, " Supination, and Pronation.     New/Revised Goals: Continue Plan Of Care   Goals:  1)   Patient to be Independent with Home exercise program and modalities for pain management by 1 week. (MET)  2)   Patient will report   1 /10 pain on average with activity to assist with exercises by 6-8 weeks.  ( Revised 09/14/2023)   3)   Patient will increase range of Motion by 3-5 degrees to increase functional use for activities of daily living by 6-8 weeks.  ( In Progress)   4)  Patient will decrease edema in Proximal Wrist Crease by 0.1-0.3 millimeters  to increase joint mobility /flexibility by 6-8 weeks.   (Revised 09/14/2023)   5)   Patient will increase manual muscle testing by a grade to assist with lifting items by 6-8 weeks.  ( Added 09/14/2023)   6)   Patient will increase  strength 3-5 pounds to open containers by 6-8 weeks.  ( Added 09/14/2023)   7)   Patient will increase pinch by 1-3 pounds for buttoning by 6-8 weeks. ( Added 09/14/2023)     Plan      Continue 2x week ( depending on work schedule) during the 90 day certification period   08/08/2023   to 11/08/2023   with established Plan Of Care in pursuit of Occupational Therapy goals.      Debbie Aguilar, OT

## 2023-09-13 ENCOUNTER — TELEPHONE (OUTPATIENT)
Dept: GASTROENTEROLOGY | Facility: CLINIC | Age: 68
End: 2023-09-13
Payer: COMMERCIAL

## 2023-09-13 NOTE — TELEPHONE ENCOUNTER
Call placed to Ms. Charanjit in regards to message received. Offered her an appt with ROLAND Romero, On 9/19 at 3p to discuss her reflux and constipation. She accepted. No further issues noted.

## 2023-09-13 NOTE — TELEPHONE ENCOUNTER
----- Message from Fern Valenzuela sent at 9/13/2023  2:28 PM CDT -----  Contact: self  Patient is req a call back from the  nurse wants to get info on some meds to help with her gastro issues she is having.  Call back at [phone] and thanks

## 2023-09-14 ENCOUNTER — CLINICAL SUPPORT (OUTPATIENT)
Dept: REHABILITATION | Facility: HOSPITAL | Age: 68
End: 2023-09-14
Payer: COMMERCIAL

## 2023-09-14 DIAGNOSIS — M25.531 RIGHT WRIST PAIN: Primary | ICD-10-CM

## 2023-09-14 PROCEDURE — 97140 MANUAL THERAPY 1/> REGIONS: CPT | Mod: PN

## 2023-09-14 PROCEDURE — 97530 THERAPEUTIC ACTIVITIES: CPT | Mod: PN

## 2023-09-14 PROCEDURE — 97110 THERAPEUTIC EXERCISES: CPT | Mod: PN

## 2023-09-14 PROCEDURE — 97022 WHIRLPOOL THERAPY: CPT | Mod: PN

## 2023-09-14 NOTE — PROGRESS NOTES
"                                  Occupational Therapy Daily Treatment Note       Date: 9/18/2023  Name: Gaby Donohue  [facility] Number: [id number]    Therapy Diagnosis: Z98.890,Z87.81 (ICD-10-CM) - Status post open reduction and internal fixation (ORIF) of fracture, S52.531D (ICD-10-CM) - Closed Colles' fracture of right radius with routine healing  Encounter Diagnosis   Name Primary?    Right wrist pain Yes     Physician: Tu Mcfarlane,*    Physician Orders: Z98.890,Z87.81 (ICD-10-CM) - Status post open reduction and internal fixation (ORIF) of fracture, S52.531D (ICD-10-CM) - Closed Colles' fracture of right radius with routine healing, brace as needed, no heavy lifting; evaluate and treat  Medical Diagnosis: Z98.890,Z87.81 (ICD-10-CM) - Status post open reduction and internal fixation (ORIF) of fracture, S52.531D (ICD-10-CM) - Closed Colles' fracture of right radius with routine healing; (Right) wrist pain  Surgical Procedure and Date: status post ORIF of (Right) wrist, 06/26/2023    Insurance Authorization Period Expiration: 08/08/2023-12/29/2023  Plan of Care Certification Period: 08/08/2023-11/08/2023  Date of Return to MD: as needed    Evaluation FOTO: 08/08/2023 = 49%  Reassessment FOTO: 09/14/2023 = 40%     Visit # / Visits authorized: 7 / 20  Time In: 1:45  Time Out: 2:30     Total Billable Time: 40  minutes    Precautions:  Standard; skin cancer;  brace as needed; no heavy lifting       Post Op: 06/26/2023 = 11 weeks, 5 days      Subjective     Patient reports: "I am doing about the same as last time."     Pain: 2/10   Location of pain: (Right) wrist      Objective    Patient seen by Occupational Therapy this session. Tx consisted of:      Supervised modalities after being cleared for contradictions  x  10   minutes:     -Fluidotherapy to  (Right)   hand to increase blood flow, circulation, tissue elasticity, desensitization, sensory re-education and for pain management  x 10 minutes.      Manual " "therapy techniques to increase joint mobilization /// scar massage/// soft tissue mobilization   x   8 minutes:     -Scar Massage to volar aspect of (Right) wrist region around incision site  with  mini therapeutic vibrator // mini dowel massager  to decrease dense scar adhesions and improve tensile glide  x 2 minute  -Manual Therapy techniques to (Right) wrist and thumb  including joint mobilizations, stretching, and Passive Range of Motion to increase joint mobility, range of motion  and for pain management  x 5 minutes   -Soft Tissue Mobilization to (Right) hand into wrist and forearm from distal to proximal to decrease edema and increase range of motion and functional abilities  x  1 minute      Therapeutic Exercises to improve functional performance while increasing strength, endurance, range of motion,  and flexibility  x   10  minutes:      - Active Assistive Range of Motion for isolated and composite digital extension / flexion of thumb and wrist in all planes x 5   Repetitions   - Velcro checkerboard (on wedge) with "dart throwing" pattern x 1 repetition  -NP  - 1# Dorsiflexion/Volar flexion (Supination /Pronation ), Radial Deviation / Ulnar Deviation x 10 repetitions  - 0.5# dowel for Supination /Pronation  x 10 repetitions    - Weighted ball on tabletop for Dorsiflexion /Volar Flexion  stretches x 10 repetitions   - High lighter rolls for intrinsics x 20 repetitions -NP  - YELLOW digiflex for isolated x 10 repetitions;  RED digiflex for composite digital flexion x 20 repetitions   - Low load prolonged stretches for Dorsiflexion /Volar Flexion with 2# leg weight  (on edge of table) x 30 seconds each x 2 repetitions          Therapeutic Activities  to improve functional performance while increasing independence with ADLs and IADLs  x   12   minutes:      - Wrist roller coaster for Active Range of Motion  of wrist in all planes x 5 repetitions -NP  - Mini colored pegboard for Fine Motor Coordination and " "in-hand manipulation  (5 in-hand) x 20 pegs  -NP  - RED Theraputty for rolling x 20 repetitions   - 15# Progressive Hand Gripper (black spring) for composite  x 20 repetitions  - RED Theraputty with PVC for "cookie cutting" and medicine top to simulate opening containers x 10  Repetitions   - Wooden pegboard with RED clothespins with 3PT pinch x 2 repetitions    - YELLOW Flexbar for Supination /Pronation  and Dorsiflexion /Volar Flexion x 10 repetitions              -NP = Not Performed     Initiate in future therapy sessions:    - Wrist wheel with ### Theraband for Supination / Pronation  x 10 repetitions       - ### Theraputty for pulling   - YELLOW digiweb for composite digital Extension and  intrinsics x 20 repetitions    - ### Wrist exerstick in standing for Dorsiflexion / Volar Flexion  x 3 repetitions           Assessment     Patient will continue to benefit from skilled Occupational Therapy intervention to increase functional abilities, range of motion, and strength and pain control.  Patient. demonstrated proper understanding of each exercise.  Patient continues to require verbal and tactile cues for throughout therapy session to maintain position and prevent compensation.   Patient continues to be limited in functional and leisurely pursuits. Pain limits patient's participation in activities of daily living.  Patient is not able to carryout necessary vocational tasks.   Patient's spiritual, cultural and educational needs considered and patient agreeable to plan of care and goals.  Patient is making good progress towards established goals.  Patient tolerated exercises / activities within pain tolerance.   Reviewed Home Exercise Program with patient., see EPIC under "patient instructions" for provided exercises, activity modifications and limitations, modalities for home pain management.  Patient. demo understanding of above.    Patient. tolerated  Fluidotherapy  with no irritation.     Patient tolerated " addition of Flexbar for Supination, Pronation, Dorsal Flexion, Volar Flexion with no reported pain.     New/Revised Goals: Continue Plan Of Care   Goals:  1)   Patient to be Independent with Home exercise program and modalities for pain management by 1 week. (MET)  2)   Patient will report   1 /10 pain on average with activity to assist with exercises by 6-8 weeks.  ( Revised 09/14/2023)   3)   Patient will increase range of Motion by 3-5 degrees to increase functional use for activities of daily living by 6-8 weeks.  ( In Progress)   4)  Patient will decrease edema in Proximal Wrist Crease by 0.1-0.3 millimeters  to increase joint mobility /flexibility by 6-8 weeks.   (Revised 09/14/2023)   5)   Patient will increase manual muscle testing by a grade to assist with lifting items by 6-8 weeks.  ( Added 09/14/2023)   6)   Patient will increase  strength 3-5 pounds to open containers by 6-8 weeks.  ( Added 09/14/2023)   7)   Patient will increase pinch by 1-3 pounds for buttoning by 6-8 weeks. ( Added 09/14/2023)     Plan      Continue 2x week ( depending on work schedule) during the 90 day certification period   08/08/2023   to 11/08/2023   with established Plan Of Care in pursuit of Occupational Therapy goals.      Debbie Aguilar, OT

## 2023-09-18 ENCOUNTER — CLINICAL SUPPORT (OUTPATIENT)
Dept: REHABILITATION | Facility: HOSPITAL | Age: 68
End: 2023-09-18
Payer: COMMERCIAL

## 2023-09-18 ENCOUNTER — TELEPHONE (OUTPATIENT)
Dept: DERMATOLOGY | Facility: CLINIC | Age: 68
End: 2023-09-18
Payer: COMMERCIAL

## 2023-09-18 DIAGNOSIS — M25.531 RIGHT WRIST PAIN: Primary | ICD-10-CM

## 2023-09-18 PROCEDURE — 97140 MANUAL THERAPY 1/> REGIONS: CPT | Mod: PN

## 2023-09-18 PROCEDURE — 97022 WHIRLPOOL THERAPY: CPT | Mod: PN

## 2023-09-18 PROCEDURE — 97530 THERAPEUTIC ACTIVITIES: CPT | Mod: PN

## 2023-09-18 PROCEDURE — 97110 THERAPEUTIC EXERCISES: CPT | Mod: PN

## 2023-09-18 NOTE — PROGRESS NOTES
"                                  Occupational Therapy Daily Treatment Note       Date: 9/20/2023  Name: Gaby Donohue  [facility] Number: [id number]    Therapy Diagnosis: Z98.890,Z87.81 (ICD-10-CM) - Status post open reduction and internal fixation (ORIF) of fracture, S52.531D (ICD-10-CM) - Closed Colles' fracture of right radius with routine healing  Encounter Diagnosis   Name Primary?    Right wrist pain Yes     Physician: Tu Mcfarlane,*    Physician Orders: Z98.890,Z87.81 (ICD-10-CM) - Status post open reduction and internal fixation (ORIF) of fracture, S52.531D (ICD-10-CM) - Closed Colles' fracture of right radius with routine healing, brace as needed, no heavy lifting; evaluate and treat  Medical Diagnosis: Z98.890,Z87.81 (ICD-10-CM) - Status post open reduction and internal fixation (ORIF) of fracture, S52.531D (ICD-10-CM) - Closed Colles' fracture of right radius with routine healing; (Right) wrist pain  Surgical Procedure and Date: status post ORIF of (Right) wrist, 06/26/2023    Insurance Authorization Period Expiration: 08/08/2023-12/29/2023  Plan of Care Certification Period: 08/08/2023-11/08/2023  Date of Return to MD: as needed    Evaluation FOTO: 08/08/2023 = 49%  Reassessment FOTO: 09/14/2023 = 40%     Visit # / Visits authorized: 8 / 20  Time In: 1:45  Time Out: 2:25   Total Billable Time: 40  minutes    Precautions:  Standard; skin cancer;  brace as needed; no heavy lifting       Post Op: 06/26/2023 = 12 weeks, 0 days      Subjective     Patient reports: "I can tolerate holding heavier items but I am still having difficulty opening containers."     Pain: 1-2/10   Location of pain: (Right) wrist      Objective    Patient seen by Occupational Therapy this session. Tx consisted of:      Supervised modalities after being cleared for contradictions  x  10   minutes:     -Fluidotherapy to  (Right)   hand to increase blood flow, circulation, tissue elasticity, desensitization, sensory re-education and " "for pain management  x 10 minutes.      Manual therapy techniques to increase joint mobilization /// scar massage/// soft tissue mobilization   x   8 minutes:     -Scar Massage to volar aspect of (Right) wrist region around incision site  with  mini therapeutic vibrator // mini dowel massager  to decrease dense scar adhesions and improve tensile glide  x 2 minute  -Manual Therapy techniques to (Right) wrist and thumb  including joint mobilizations, stretching, and Passive Range of Motion to increase joint mobility, range of motion  and for pain management  x 5 minutes   -Soft Tissue Mobilization to (Right) hand into wrist and forearm from distal to proximal to decrease edema and increase range of motion and functional abilities  x  1 minute      Therapeutic Exercises to improve functional performance while increasing strength, endurance, range of motion,  and flexibility  x   10  minutes:      - Active Assistive Range of Motion for isolated and composite digital extension / flexion of thumb and wrist in all planes x 5   Repetitions   - Velcro checkerboard (on wedge) with "dart throwing" pattern x 1 repetition  -NP  - 1# Dorsiflexion/Volar flexion (Supination /Pronation ), Radial Deviation / Ulnar Deviation x 10 repetitions  - 0.5# dowel for Supination /Pronation  x 10 repetitions    - Weighted ball on tabletop for Dorsiflexion /Volar Flexion  stretches x 10 repetitions   - High lighter rolls for intrinsics x 20 repetitions -NP  - YELLOW digiflex for isolated x 10 repetitions;  RED digiflex for composite digital flexion x 20 repetitions   - Low load prolonged stretches for Dorsiflexion /Volar Flexion with 2# leg weight  (on edge of table) x 30 seconds each x 2 repetitions          Therapeutic Activities  to improve functional performance while increasing independence with ADLs and IADLs  x   12   minutes:      - Wrist roller coaster for Active Range of Motion  of wrist in all planes x 5 repetitions -NP  - Mini " "colored pegboard for Fine Motor Coordination and in-hand manipulation  (5 in-hand) x 20 pegs  -NP  - RED Theraputty for rolling x 20 repetitions   - 25# Progressive Hand Gripper (black spring) for composite  x 20 repetitions  - RED Theraputty with PVC for "cookie cutting" and medicine top to simulate opening containers x 10  Repetitions   - Wooden pegboard with GREEN clothespins with 3PT pinch x 2 repetitions    - YELLOW Flexbar for Supination /Pronation  and Dorsiflexion /Volar Flexion x 10 repetitions              -NP = Not Performed     Initiate in future therapy sessions:    - Wrist wheel with ### Theraband for Supination / Pronation  x 10 repetitions       - ### Theraputty for pulling   - YELLOW digiweb for composite digital Extension and  intrinsics x 20 repetitions    - ### Wrist exerstick in standing for Dorsiflexion / Volar Flexion  x 3 repetitions           Assessment     Patient will continue to benefit from skilled Occupational Therapy intervention to increase functional abilities, range of motion, and strength and pain control.  Patient. demonstrated proper understanding of each exercise.  Patient continues to require verbal and tactile cues for throughout therapy session to maintain position and prevent compensation.   Patient continues to be limited in functional and leisurely pursuits. Pain limits patient's participation in activities of daily living.  Patient is not able to carryout necessary vocational tasks.   Patient's spiritual, cultural and educational needs considered and patient agreeable to plan of care and goals.  Patient is making good progress towards established goals.  Patient tolerated exercises / activities within pain tolerance.   Reviewed Home Exercise Program with patient., see EPIC under "patient instructions" for provided exercises, activity modifications and limitations, modalities for home pain management.  Patient. demo understanding of above.    Patient. tolerated  " Fluidotherapy  with no irritation.     Patient tolerated increase in resistance during wooden pegboard with clothespins for 3 Point Pinch; Progressive Hand Gripper for composite  with no reported pain.     New/Revised Goals: Continue Plan Of Care   Goals:  1)   Patient to be Independent with Home exercise program and modalities for pain management by 1 week. (MET)  2)   Patient will report   1 /10 pain on average with activity to assist with exercises by 6-8 weeks.  ( Revised 09/14/2023)   3)   Patient will increase range of Motion by 3-5 degrees to increase functional use for activities of daily living by 6-8 weeks.  ( In Progress)   4)  Patient will decrease edema in Proximal Wrist Crease by 0.1-0.3 millimeters  to increase joint mobility /flexibility by 6-8 weeks.   (Revised 09/14/2023)   5)   Patient will increase manual muscle testing by a grade to assist with lifting items by 6-8 weeks.  ( Added 09/14/2023)   6)   Patient will increase  strength 3-5 pounds to open containers by 6-8 weeks.  ( Added 09/14/2023)   7)   Patient will increase pinch by 1-3 pounds for buttoning by 6-8 weeks. ( Added 09/14/2023)     Plan      Continue 2x week ( depending on work schedule) during the 90 day certification period   08/08/2023   to 11/08/2023   with established Plan Of Care in pursuit of Occupational Therapy goals.      Debbie Aguilar, OT

## 2023-09-18 NOTE — TELEPHONE ENCOUNTER
----- Message from Fern Valenzuela sent at 9/18/2023 11:15 AM CDT -----  Contact: self  Type: Needs Medical Advice  Who Called:  patient   Symptoms (please be specific):  has a thing on her face she would like to ask the nurse about  Best Call Back Number: [phone]  Additional Information: thanks

## 2023-09-19 ENCOUNTER — OFFICE VISIT (OUTPATIENT)
Dept: GASTROENTEROLOGY | Facility: CLINIC | Age: 68
End: 2023-09-19
Payer: COMMERCIAL

## 2023-09-19 VITALS
DIASTOLIC BLOOD PRESSURE: 80 MMHG | HEIGHT: 64 IN | BODY MASS INDEX: 29.35 KG/M2 | HEART RATE: 89 BPM | SYSTOLIC BLOOD PRESSURE: 131 MMHG | WEIGHT: 171.94 LBS

## 2023-09-19 DIAGNOSIS — R11.0 NAUSEA: ICD-10-CM

## 2023-09-19 DIAGNOSIS — K59.09 CHRONIC CONSTIPATION: Primary | ICD-10-CM

## 2023-09-19 DIAGNOSIS — R14.3 FLATUS: ICD-10-CM

## 2023-09-19 DIAGNOSIS — K21.9 GASTROESOPHAGEAL REFLUX DISEASE WITHOUT ESOPHAGITIS: ICD-10-CM

## 2023-09-19 DIAGNOSIS — Z86.010 HISTORY OF COLON POLYPS: ICD-10-CM

## 2023-09-19 DIAGNOSIS — Z98.890 HISTORY OF COLONOSCOPY: ICD-10-CM

## 2023-09-19 DIAGNOSIS — Z90.49 S/P CHOLECYSTECTOMY: ICD-10-CM

## 2023-09-19 PROCEDURE — 1101F PT FALLS ASSESS-DOCD LE1/YR: CPT | Mod: CPTII,S$GLB,,

## 2023-09-19 PROCEDURE — 99999 PR PBB SHADOW E&M-EST. PATIENT-LVL IV: CPT | Mod: PBBFAC,,,

## 2023-09-19 PROCEDURE — 1160F RVW MEDS BY RX/DR IN RCRD: CPT | Mod: CPTII,S$GLB,,

## 2023-09-19 PROCEDURE — 1159F MED LIST DOCD IN RCRD: CPT | Mod: CPTII,S$GLB,,

## 2023-09-19 PROCEDURE — 3008F BODY MASS INDEX DOCD: CPT | Mod: CPTII,S$GLB,,

## 2023-09-19 PROCEDURE — 99214 PR OFFICE/OUTPT VISIT, EST, LEVL IV, 30-39 MIN: ICD-10-PCS | Mod: S$GLB,,,

## 2023-09-19 PROCEDURE — 3008F PR BODY MASS INDEX (BMI) DOCUMENTED: ICD-10-PCS | Mod: CPTII,S$GLB,,

## 2023-09-19 PROCEDURE — 1126F AMNT PAIN NOTED NONE PRSNT: CPT | Mod: CPTII,S$GLB,,

## 2023-09-19 PROCEDURE — 3288F PR FALLS RISK ASSESSMENT DOCUMENTED: ICD-10-PCS | Mod: CPTII,S$GLB,,

## 2023-09-19 PROCEDURE — 1160F PR REVIEW ALL MEDS BY PRESCRIBER/CLIN PHARMACIST DOCUMENTED: ICD-10-PCS | Mod: CPTII,S$GLB,,

## 2023-09-19 PROCEDURE — 3288F FALL RISK ASSESSMENT DOCD: CPT | Mod: CPTII,S$GLB,,

## 2023-09-19 PROCEDURE — 3075F SYST BP GE 130 - 139MM HG: CPT | Mod: CPTII,S$GLB,,

## 2023-09-19 PROCEDURE — 1101F PR PT FALLS ASSESS DOC 0-1 FALLS W/OUT INJ PAST YR: ICD-10-PCS | Mod: CPTII,S$GLB,,

## 2023-09-19 PROCEDURE — 1126F PR PAIN SEVERITY QUANTIFIED, NO PAIN PRESENT: ICD-10-PCS | Mod: CPTII,S$GLB,,

## 2023-09-19 PROCEDURE — 3075F PR MOST RECENT SYSTOLIC BLOOD PRESS GE 130-139MM HG: ICD-10-PCS | Mod: CPTII,S$GLB,,

## 2023-09-19 PROCEDURE — 99214 OFFICE O/P EST MOD 30 MIN: CPT | Mod: S$GLB,,,

## 2023-09-19 PROCEDURE — 99999 PR PBB SHADOW E&M-EST. PATIENT-LVL IV: ICD-10-PCS | Mod: PBBFAC,,,

## 2023-09-19 PROCEDURE — 1159F PR MEDICATION LIST DOCUMENTED IN MEDICAL RECORD: ICD-10-PCS | Mod: CPTII,S$GLB,,

## 2023-09-19 PROCEDURE — 3079F DIAST BP 80-89 MM HG: CPT | Mod: CPTII,S$GLB,,

## 2023-09-19 PROCEDURE — 3079F PR MOST RECENT DIASTOLIC BLOOD PRESSURE 80-89 MM HG: ICD-10-PCS | Mod: CPTII,S$GLB,,

## 2023-09-19 RX ORDER — OMEPRAZOLE 40 MG/1
CAPSULE, DELAYED RELEASE ORAL
Qty: 120 CAPSULE | Refills: 0 | Status: SHIPPED | OUTPATIENT
Start: 2023-09-19 | End: 2023-12-26

## 2023-09-19 NOTE — PROGRESS NOTES
"Subjective:       Patient ID: Gaby Donohue is a 68 y.o. female Body mass index is 29.52 kg/m².    Chief Complaint: Gastroesophageal Reflux and Constipation    Established patient of Dr. Goldman, Vero Bell, and myself.     Gastroesophageal Reflux  She complains of nausea (comes in waves - occurs in the morning and improves over time; reports nausea is mild and does not require medication management; occurs a couple times a month). She reports no abdominal pain (resolved), no belching, no chest pain, no choking, no coughing, no dysphagia, no early satiety, no globus sensation, no heartburn (denies heartburn), no hoarse voice, no sore throat or no water brash. This is a chronic problem. The current episode started more than 1 year ago. The problem occurs frequently. The problem has been gradually worsening. The symptoms are aggravated by certain foods and lying down (Worsens after eating red sauce, acidic, greasy foods, or chocolate). Pertinent negatives include no anemia, fatigue, melena, muscle weakness or weight loss. Risk factors include caffeine use and obesity (Drinks 1-2 coffees a day and daily cokes -she is trying to decrease caffeine intake). She has tried a PPI, a diet change and head elevation (Currently taking prilosec 40 mg daily (reports she was taking her 's Prilosec); past treatments: Prevacid 30 mg once daily (stopped working) & Protonix - ineffective; avoiding acidic food) for the symptoms. The treatment provided moderate relief. Past procedures include an abdominal ultrasound (02/03/12 -  "Right renal cysts including a calcified cyst which is reportedly stable relative to 2004. Prior cholecystectomy. Large exophytic right upper pole renal cyst which is simple") and an EGD (06/09/21 - Normal esophagus. Erythematous mucosa in the antrum. Biopsied. Multiple gastric polyps. Normal examined duodenum; patho: Fundic gland polyp, benign, no bacteria). Past procedures do not include esophageal " manometry, esophageal pH monitoring, H. pylori antibody titer or a UGI. S/P cholecystectomy. Past invasive treatments do not include gastroplasty, gastroplication or reflux surgery.   Constipation  This is a chronic problem. The current episode started more than 1 year ago. The problem has been gradually worsening since onset. Her stool frequency is 1 time per day (small). The stool is described as pellet like (rated stool 1 on Kingston scale). The patient is not on a high fiber diet (Eats high-fiber diet; did take Metamucil in the past, but discontinued because it was ineffective). She Does not exercise regularly. There has Not been adequate water intake. Associated symptoms include flatus (reports intermittent embarrassing episodes of flatus; has tried Gas-X in the past, but unsure of effectiveness) and nausea (comes in waves - occurs in the morning and improves over time; reports nausea is mild and does not require medication management; occurs a couple times a month). Pertinent negatives include no abdominal pain (resolved), anorexia, bloating, diarrhea, fecal incontinence, fever, hematochezia, hemorrhoids, melena, rectal pain, vomiting or weight loss. Associated symptoms comments: Colonoscopy 09/01/23 - A tattoo was seen in the ascending colon. The tattoo site appeared normal. Three 2 to 5 mm polyps in the ascending colon, removed with a cold biopsy forceps. Resected and retrieved. External and internal hemorrhoids. The examination was otherwise normal on direct and retroflexion views; patho: tubular adenoma. Risk factors: denies risk factors such as new medications, diet change, dehydration, or immobility. She has tried fiber and laxatives (Reports taking 1 Dulcolax tablet with resultant BM; past treatments:  MiraLax (does not like the taste)) for the symptoms. The treatment provided significant relief. Her past medical history is significant for abdominal surgery (s/p cholecystectomy) and psychiatric history  (Hx of MDD). There is no history of endocrine disease, inflammatory bowel disease, irritable bowel syndrome, metabolic disease, neurologic disease, neuromuscular disease or radiation treatment.     Review of Systems   Constitutional:  Negative for activity change, appetite change, chills, diaphoresis, fatigue, fever, unexpected weight change and weight loss.   HENT:  Negative for hoarse voice, sore throat and trouble swallowing.    Respiratory:  Negative for cough, choking and shortness of breath.    Cardiovascular:  Negative for chest pain.   Gastrointestinal:  Positive for constipation, flatus (reports intermittent embarrassing episodes of flatus; has tried Gas-X in the past, but unsure of effectiveness) and nausea (comes in waves - occurs in the morning and improves over time; reports nausea is mild and does not require medication management; occurs a couple times a month). Negative for abdominal distention, abdominal pain (resolved), anal bleeding, anorexia, bloating, blood in stool, diarrhea, dysphagia, heartburn (denies heartburn), hematochezia, hemorrhoids, melena, rectal pain and vomiting.   Musculoskeletal:  Negative for muscle weakness.       No LMP recorded. Patient is postmenopausal.  Past Medical History:   Diagnosis Date    Anxiety     Basal cell carcinoma     scalp and lip    Cancer     skin ca on face    Colon polyp     Depression     GERD (gastroesophageal reflux disease)     Hyperlipidemia     Squamous cell carcinoma of skin     Wears glasses      Past Surgical History:   Procedure Laterality Date    CHOLECYSTECTOMY      COLONOSCOPY  04/20/2012    Dr. Jaime, 5 year recheck    COLONOSCOPY N/A 06/09/2021    Procedure: COLONOSCOPY;  Surgeon: Yasmeen Goldman MD;  Location: [facility];  Service: Endoscopy;  Laterality: N/A;    COLONOSCOPY N/A 9/1/2023    Procedure: COLONOSCOPY;  Surgeon: Yasmeen Goldman MD;  Location: [facility];  Service: Endoscopy;  Laterality: N/A;     ESOPHAGOGASTRODUODENOSCOPY  2012    Dr. Jaime; gastritis; bx unremarkable    ESOPHAGOGASTRODUODENOSCOPY N/A 2021    Procedure: EGD (ESOPHAGOGASTRODUODENOSCOPY);  Surgeon: Yasmeen Goldman MD;  Location: [facility] ENDO;  Service: Endoscopy;  Laterality: N/A;    FRACTURE SURGERY  2013    left arm Dr Cooper    laporoscopy      OPEN REDUCTION AND INTERNAL FIXATION (ORIF) OF INJURY OF WRIST Right 2023    Procedure: ORIF, WRIST;  Surgeon: Shivam Canela MD;  Location: [facility] OR;  Service: Orthopedics;  Laterality: Right;    UPPER GASTROINTESTINAL ENDOSCOPY       Family History   Problem Relation Age of Onset    COPD Mother     Emphysema Mother     Heart disease Father     Cancer Sister         skin cancer face    Cancer Sister         skin cancer face    Cancer Sister         skin cancer face     Cancer Brother         skin cancer face     No Known Problems Brother     No Known Problems Daughter     No Known Problems Daughter     Colon cancer Neg Hx     Colon polyps Neg Hx     Esophageal cancer Neg Hx     Stomach cancer Neg Hx      Social History     Tobacco Use    Smoking status: Former     Current packs/day: 0.00     Average packs/day: 1.5 packs/day for 21.0 years (31.5 ttl pk-yrs)     Types: Cigarettes     Start date: 1963     Quit date: 1984     Years since quittin.4    Smokeless tobacco: Never   Substance Use Topics    Alcohol use: Yes     Comment: OCC    Drug use: No     Wt Readings from Last 10 Encounters:   23 78 kg (171 lb 15.3 oz)   23 79.4 kg (175 lb)   23 79.8 kg (175 lb 14.8 oz)   23 79.8 kg (175 lb 14.8 oz)   23 79.8 kg (175 lb 14.8 oz)   23 79.8 kg (175 lb 14.8 oz)   23 79.8 kg (175 lb 14.8 oz)   23 79.8 kg (175 lb 14.8 oz)   23 79.8 kg (175 lb 14.8 oz)   23 79.8 kg (176 lb)     Lab Results   Component Value Date    WBC 5.33 2023    WBC 5.50 2023    HGB 13.9 2023    HGB 13.9 2023     HCT 42.8 06/23/2023    HCT 41.5 06/23/2023    MCV 91 06/23/2023    MCV 92 06/23/2023     06/23/2023     06/23/2023     CMP  Sodium   Date Value Ref Range Status   06/23/2023 137 136 - 145 mmol/L Final   06/23/2023 137 136 - 145 mmol/L Final     Potassium   Date Value Ref Range Status   06/23/2023 4.7 3.5 - 5.1 mmol/L Final   06/23/2023 4.6 3.5 - 5.1 mmol/L Final     Chloride   Date Value Ref Range Status   06/23/2023 102 95 - 110 mmol/L Final   06/23/2023 101 95 - 110 mmol/L Final     CO2   Date Value Ref Range Status   06/23/2023 30 (H) 23 - 29 mmol/L Final   06/23/2023 28 23 - 29 mmol/L Final     Glucose   Date Value Ref Range Status   06/23/2023 90 70 - 110 mg/dL Final   06/23/2023 89 70 - 110 mg/dL Final     BUN   Date Value Ref Range Status   06/23/2023 12 8 - 23 mg/dL Final   06/23/2023 12 8 - 23 mg/dL Final     Creatinine   Date Value Ref Range Status   06/23/2023 0.7 0.5 - 1.4 mg/dL Final   06/23/2023 0.7 0.5 - 1.4 mg/dL Final     Calcium   Date Value Ref Range Status   06/23/2023 9.4 8.7 - 10.5 mg/dL Final   06/23/2023 9.3 8.7 - 10.5 mg/dL Final     Total Protein   Date Value Ref Range Status   06/23/2023 6.8 6.0 - 8.4 g/dL Final   06/23/2023 6.8 6.0 - 8.4 g/dL Final     Albumin   Date Value Ref Range Status   06/23/2023 3.6 3.5 - 5.2 g/dL Final   06/23/2023 3.6 3.5 - 5.2 g/dL Final     Total Bilirubin   Date Value Ref Range Status   06/23/2023 0.5 0.1 - 1.0 mg/dL Final     Comment:     For infants and newborns, interpretation of results should be based  on gestational age, weight and in agreement with clinical  observations.    Premature Infant recommended reference ranges:  Up to 24 hours.............<8.0 mg/dL  Up to 48 hours............<12.0 mg/dL  3-5 days..................<15.0 mg/dL  6-29 days.................<15.0 mg/dL     06/23/2023 0.5 0.1 - 1.0 mg/dL Final     Comment:     For infants and newborns, interpretation of results should be based  on gestational age, weight and in  agreement with clinical  observations.    Premature Infant recommended reference ranges:  Up to 24 hours.............<8.0 mg/dL  Up to 48 hours............<12.0 mg/dL  3-5 days..................<15.0 mg/dL  6-29 days.................<15.0 mg/dL       Alkaline Phosphatase   Date Value Ref Range Status   06/23/2023 79 55 - 135 U/L Final   06/23/2023 80 55 - 135 U/L Final     AST   Date Value Ref Range Status   06/23/2023 21 10 - 40 U/L Final   06/23/2023 22 10 - 40 U/L Final     ALT   Date Value Ref Range Status   06/23/2023 18 10 - 44 U/L Final   06/23/2023 18 10 - 44 U/L Final     Anion Gap   Date Value Ref Range Status   06/23/2023 5 (L) 8 - 16 mmol/L Final   06/23/2023 8 8 - 16 mmol/L Final     eGFR if    Date Value Ref Range Status   06/19/2020 >60.0 >60 mL/min/1.73 m^2 Final     eGFR if non    Date Value Ref Range Status   06/19/2020 >60.0 >60 mL/min/1.73 m^2 Final     Comment:     Calculation used to obtain the estimated glomerular filtration  rate (eGFR) is the CKD-EPI equation.        Lab Results   Component Value Date    TSH 2.076 06/23/2023     Reviewed prior medical records including radiology report of abdominal pelvis CT 02/23/2012, abdominal ultrasound 02/03/2012 & endoscopy history (see surgical history).    Objective:      Physical Exam  Vitals and nursing note reviewed.   Constitutional:       General: She is not in acute distress.     Appearance: Normal appearance. She is not ill-appearing.   HENT:      Mouth/Throat:      Lips: Pink. No lesions.   Cardiovascular:      Rate and Rhythm: Normal rate and regular rhythm.      Pulses: Normal pulses.   Pulmonary:      Effort: Pulmonary effort is normal. No respiratory distress.      Breath sounds: Normal breath sounds.   Abdominal:      General: Abdomen is protuberant. Bowel sounds are normal. There is no distension or abdominal bruit. There are no signs of injury.      Palpations: Abdomen is soft. There is no shifting  dullness, fluid wave, hepatomegaly, splenomegaly or mass.      Tenderness: There is no abdominal tenderness. There is no guarding or rebound. Negative signs include Jane's sign, Rovsing's sign and McBurney's sign.      Hernia: No hernia is present.   Skin:     General: Skin is warm and dry.      Coloration: Skin is not jaundiced or pale.   Neurological:      Mental Status: She is alert and oriented to person, place, and time.   Psychiatric:         Attention and Perception: Attention normal.         Mood and Affect: Mood normal.         Speech: Speech normal.         Behavior: Behavior normal.         Assessment:       1. Chronic constipation    2. Flatus    3. History of colon polyps    4. Gastroesophageal reflux disease without esophagitis    5. Nausea    6. History of colonoscopy    7. S/P cholecystectomy          Plan:       Chronic constipation  -START: OTC stool softener such as Colace as directed to avoid hard stools and straining with bowel movements PRN  Recommend daily exercise as tolerated, adequate water intake (six 8-oz glasses of water daily), and high fiber diet.   - If no improvement with above recommendations, try intermittently dosed Dulcolax OTC as directed (every 3-4  days) PRN to facilitate bowel movements  -If still no improvement with these measures, call/follow-up  -consider Linzess    Flatus  - recommend OTC simethicone as directed, such as Phazyme or Gas-x  - recommend low gas diet: Reduce or eliminate these foods from your diet: Broccoli, Cauliflower, [place] sprouts, Cabbage, Cooked dried beans, Carbonated beverages (sparkling water, soda, beer, champagne)  Other Causes Of Excess Gas Include:   1) EATING TOO FAST or TALKING WHILE YOU CHEW may cause you to swallow air. This increases the amount of gas in the stomach and may worsen your symptoms.  --> Chew each mouthful completely before swallowing. Take your time.  2) OVEREATING may increase the feeling of being bloated and cause more  gas.  --> When you are full, stop eating.  3) CONSTIPATION can increase the amount of normal intestinal gas.  --> Avoid constipation by increasing the amount of fiber in your diet by including whole cereal grains, fresh vegetables (except those in the above list) and fresh fruits. High-fiber foods absorb water and carry it out of the body. When increasing the amount of fiber in your diet, you also need to increase the amount of water that you drink. You should drink at least eight 8-ounce glasses of water (two quarts) per day.    History of colon polyps  -followup for surveillance colonoscopy 09/2026    Gastroesophageal reflux disease without esophagitis & Nausea  -Avoid large meals, avoid eating within 2-3 hours of bedtime (avoid late night eating & lying down soon after eating), elevate head of bed if nocturnal symptoms are present, smoking cessation (if current smoker), & weight loss (if overweight).   -Avoid known foods which trigger reflux symptoms & to minimize/avoid high-fat foods, chocolate, caffeine, citrus, alcohol, & tomato products.  -Avoid/limit use of NSAID's, since they can cause GI upset, bleeding, and/or ulcers. If needed, take with food.  -     FL Esophagram Complete; Future; Expected date: 09/19/2023  -START: omeprazole (PRILOSEC) 40 MG capsule; Take 1 capsule (40 mg total) by mouth 2 (two) times daily before meals for 30 days, THEN 1 capsule (40 mg total) every morning.  Dispense: 120 capsule; Refill: 0    History of colonoscopy    - Informed patient of colonoscopy results, patient verbalized understanding    S/P cholecystectomy    Follow up in about 4 weeks (around 10/17/2023), or if symptoms worsen or fail to improve.      If no improvement in symptoms or symptoms worsen, call/follow-up at clinic or go to ER.        30 minutes of total time spent on the encounter, which includes facof tests), Obtaining and/or reviewing separately obtained history, [place] to face time and non-face to face time  preparing to see the patient (eg, review nting clinical information in the electronic or other health record, Independently interpreting results (not separately reported) and communicating results to the patient/family/caregiver, or Care coordination (not separately reported).     A dictation software program was used for this note. Please expect some simple typographical  errors in this note.

## 2023-09-20 ENCOUNTER — CLINICAL SUPPORT (OUTPATIENT)
Dept: REHABILITATION | Facility: HOSPITAL | Age: 68
End: 2023-09-20
Payer: COMMERCIAL

## 2023-09-20 DIAGNOSIS — M25.531 RIGHT WRIST PAIN: Primary | ICD-10-CM

## 2023-09-20 PROCEDURE — 97140 MANUAL THERAPY 1/> REGIONS: CPT | Mod: PN

## 2023-09-20 PROCEDURE — 97022 WHIRLPOOL THERAPY: CPT | Mod: PN

## 2023-09-20 PROCEDURE — 97530 THERAPEUTIC ACTIVITIES: CPT | Mod: PN

## 2023-09-20 PROCEDURE — 97110 THERAPEUTIC EXERCISES: CPT | Mod: PN

## 2023-09-27 NOTE — PROGRESS NOTES
"                                Occupational Therapy Discharge Summary       Date: 9/29/2023  Name: Gaby Donohue  [facility] Number: [id number]    Therapy Diagnosis: Z98.890,Z87.81 (ICD-10-CM) - Status post open reduction and internal fixation (ORIF) of fracture, S52.531D (ICD-10-CM) - Closed Colles' fracture of right radius with routine healing  Encounter Diagnosis   Name Primary?    Right wrist pain Yes     Physician: Tu Mcfarlane,*    Physician Orders: Z98.890,Z87.81 (ICD-10-CM) - Status post open reduction and internal fixation (ORIF) of fracture, S52.531D (ICD-10-CM) - Closed Colles' fracture of right radius with routine healing, brace as needed, no heavy lifting; evaluate and treat  Medical Diagnosis: Z98.890,Z87.81 (ICD-10-CM) - Status post open reduction and internal fixation (ORIF) of fracture, S52.531D (ICD-10-CM) - Closed Colles' fracture of right radius with routine healing; (Right) wrist pain  Surgical Procedure and Date: status post ORIF of (Right) wrist, 06/26/2023    Insurance Authorization Period Expiration: 08/08/2023-12/29/2023  Plan of Care Certification Period: 08/08/2023-11/08/2023  Date of Return to MD: as needed    Evaluation FOTO: 08/08/2023 = 49%  Reassessment FOTO: 09/14/2023 = 40%   Discharge FOTO: 09/29/2023 = 29%    Visit # / Visits authorized: 9 / 20  Time In: 1:45  Time Out: 2:15   Total Billable Time: 25  minutes    Precautions:  Standard; skin cancer;     Post Op: 06/26/2023 = 13 weeks, 2 days      Subjective     Patient reports: "I think I can continue my exercises at home.  I am getting better with opening containers."     Pain: 1-2/10   Location of pain: (Right) wrist      Objective    Patient seen by Occupational Therapy this session. Tx consisted of:          Therapeutic Activities  to reviewed Home Exercise Program, precautions, and activity modifications, reassess functional abilities, range of motion, edema, Manual Muscle Testing,  and pinch strength  x   25   " "minutes:      -Updated Home Exercise Program: Patient provided with and educated on written Home Exercise Program with [employer] The[employer] (rolling, 3 Point Pinch, and composite ) .  See EMR under "patient instructions."  Patient demo understanding of above.        Patient reassessed as follows:     Edema:   centimeters (Right) / (Left)  Metacarpals:  19.0   / 18.9  Proximal Wrist Crease:  17.5 (-0.8)   / 16.7               Active Range of Motion: hand   ( measured with wrist goni from digital tip to Distal Palmar Crease) cm  (Right)    II:  0.0   III:  0.0    IV: 0.0    V: 0.0     Opposition:  0.0 (+1.0)   Comments: (Measured with wrist goni from thumb tip to base of 5th metacarpal) centimeters                            Active Range of Motion: wrist                         (Right)   //  (Left)  Dorsal Flexion /Volar Flexion : 60 (+5)  / 70  (+5)   // 65/80  Radial Deviation /Ulnar Deviation:  12 (+4)  / 25 (+9)   // 13/30  Supination / Pronation: 85 (+15)  / 85 (+15)   // 80/85     Passive Range of Motion: wrist         ( submaximally, within pain tolerance)                    (Right)    Dorsal Flexion /Volar Flexion : 70 (+5)  / 75 (+5)   Radial Deviation /Ulnar Deviation:  15 (+5)  / 28 (+8)   Supination / Pronation:  90 (+15)  / 90 (+10)     Comments: fracture (Left) wrist approx 9 years ago     Manual Muscle Test:  Wrist    ( submaximally, within pain tolerance)   (Right)    Dorsal Flexion:  5/5  (+2)   Volar Flexion:  5/5  (+2)   Radial Deviation:  5/5  (+3)   Ulnar Deviation:  5/5  (+2)          Strength: (DORIE Dynamometer in pounds),Position II    ( submaximally, within pain tolerance)   (Right):   35  (+10)   (Left):     45         Pinch Strength: (Pinch Gauge in pounds)    ( submaximally, within pain tolerance)                       (Right) /  (Left)  Lateral Pinch:  10 (+2)   /  12  3 Point Pinch:  8 (+1)   /  13  2 Point Pinch:  6 (+2)   / 10         Assessment      Patient to be discharged home 2* " "to reaching max benefit from skilled outpatient Occupational Therapy at this time.  Patient to continue Home Exercise Program to maintain range of motion, strength, functional abilities, and pain management.  Patient to follow up with doctor as needed.    Patient's spiritual, cultural and educational needs considered and patient agreeable to plan of care and goals.    Reviewed Home Exercise Program with patient., see EPIC under "patient instructions" for provided exercises, activity modifications and limitations, modalities for home pain management.  Patient. demo understanding of above.      Patient met or partially met 7 /7 established goals.  Patient demo decreased edema in (Right) Proximal Wrist Crease; increased Active range of motion for (Right) thumb opposition; increased (Right) wrist Active range of motion and Passive range of motion for Dorsal Flexion, Volar Flexion, Radial Deviation, Ulnar Deviation, Supination, and Pronation;  increased Manual Muscle Testing for (Right) wrist Dorsal Flexion, Volar Flexion, Radial Deviation, and Ulnar Deviation;  increased (Right)  strength, Lateral Pinch, 3 Point Pinch, 2 Point Pinch strength.  Patient reported decreased pain on average with activity.       Goals:  1)   Patient to be Independent with Home exercise program and modalities for pain management by 1 week. (MET)  2)   Patient will report   1 /10 pain on average with activity to assist with exercises by 6-8 weeks.  ( Partially Met)    3)   Patient will increase range of Motion by 3-5 degrees to increase functional use for activities of daily living by 6-8 weeks.  ( MET)   4)  Patient will decrease edema in Proximal Wrist Crease by 0.1-0.3 millimeters  to increase joint mobility /flexibility by 6-8 weeks.   (MET)   5)   Patient will increase manual muscle testing by a grade to assist with lifting items by 6-8 weeks.  ( MET)   6)   Patient will increase  strength 3-5 pounds to open containers by 6-8 " weeks.  ( MET)   7)   Patient will increase pinch by 1-3 pounds for buttoning by 6-8 weeks. ( MET)     Plan      Patient to be discharged home 2* to reaching max benefit from skilled outpatient Occupational Therapy at this time.    Patient to follow up with doctor as needed.           Debbie Aguilar, OT

## 2023-09-29 ENCOUNTER — CLINICAL SUPPORT (OUTPATIENT)
Dept: REHABILITATION | Facility: HOSPITAL | Age: 68
End: 2023-09-29
Payer: COMMERCIAL

## 2023-09-29 DIAGNOSIS — M25.531 RIGHT WRIST PAIN: Primary | ICD-10-CM

## 2023-09-29 PROCEDURE — 97530 THERAPEUTIC ACTIVITIES: CPT | Mod: PN

## 2023-11-13 DIAGNOSIS — Z12.31 ENCOUNTER FOR SCREENING MAMMOGRAM FOR MALIGNANT NEOPLASM OF BREAST: Primary | ICD-10-CM

## 2023-11-27 DIAGNOSIS — F32.5 MAJOR DEPRESSIVE DISORDER WITH SINGLE EPISODE, IN FULL REMISSION: ICD-10-CM

## 2023-11-27 RX ORDER — DULOXETIN HYDROCHLORIDE 30 MG/1
CAPSULE, DELAYED RELEASE ORAL
Qty: 90 CAPSULE | Refills: 0 | Status: SHIPPED | OUTPATIENT
Start: 2023-11-27 | End: 2023-12-26 | Stop reason: SDUPTHER

## 2023-11-27 NOTE — TELEPHONE ENCOUNTER
Refill Decision Note   Gaby Donohue  is requesting a refill authorization.  Brief Assessment and Rationale for Refill:  Approve     Medication Therapy Plan:         Comments:     Note composed:4:09 PM 11/27/2023

## 2023-11-27 NOTE — TELEPHONE ENCOUNTER
No care due was identified.  Health [facility] Embedded Care Due Messages. Reference number: [id number].   11/27/2023 7:59:01 AM CST

## 2023-12-01 ENCOUNTER — TELEPHONE (OUTPATIENT)
Dept: ENDOCRINOLOGY | Facility: CLINIC | Age: 68
End: 2023-12-01
Payer: COMMERCIAL

## 2023-12-01 NOTE — TELEPHONE ENCOUNTER
Iam for pt to call [facility] to r/s appt.Pt scheduled herself for 12/13 to see Eliane for osteo and hormone replacement Therapy.Eliane only does diabetes

## 2023-12-05 ENCOUNTER — HOSPITAL ENCOUNTER (OUTPATIENT)
Dept: RADIOLOGY | Facility: HOSPITAL | Age: 68
Discharge: HOME | End: 2023-12-05
Attending: OBSTETRICS & GYNECOLOGY
Payer: COMMERCIAL

## 2023-12-05 DIAGNOSIS — Z12.31 ENCOUNTER FOR SCREENING MAMMOGRAM FOR MALIGNANT NEOPLASM OF BREAST: ICD-10-CM

## 2023-12-05 PROCEDURE — 77067 SCR MAMMO BI INCL CAD: CPT | Mod: TC,PO

## 2023-12-26 ENCOUNTER — TELEPHONE (OUTPATIENT)
Dept: FAMILY MEDICINE | Facility: CLINIC | Age: 68
End: 2023-12-26

## 2023-12-26 ENCOUNTER — TELEPHONE (OUTPATIENT)
Dept: ENDOCRINOLOGY | Facility: CLINIC | Age: 68
End: 2023-12-26
Payer: COMMERCIAL

## 2023-12-26 ENCOUNTER — OFFICE VISIT (OUTPATIENT)
Dept: FAMILY MEDICINE | Facility: CLINIC | Age: 68
End: 2023-12-26
Attending: FAMILY MEDICINE
Payer: COMMERCIAL

## 2023-12-26 VITALS
WEIGHT: 172.06 LBS | TEMPERATURE: 99 F | DIASTOLIC BLOOD PRESSURE: 88 MMHG | HEIGHT: 64 IN | RESPIRATION RATE: 17 BRPM | BODY MASS INDEX: 29.38 KG/M2 | HEART RATE: 82 BPM | SYSTOLIC BLOOD PRESSURE: 140 MMHG | OXYGEN SATURATION: 98 %

## 2023-12-26 DIAGNOSIS — M81.0 OSTEOPOROSIS, UNSPECIFIED OSTEOPOROSIS TYPE, UNSPECIFIED PATHOLOGICAL FRACTURE PRESENCE: ICD-10-CM

## 2023-12-26 DIAGNOSIS — Z86.010 HISTORY OF COLON POLYPS: ICD-10-CM

## 2023-12-26 DIAGNOSIS — F32.5 MAJOR DEPRESSIVE DISORDER WITH SINGLE EPISODE, IN FULL REMISSION: ICD-10-CM

## 2023-12-26 DIAGNOSIS — K21.9 GASTROESOPHAGEAL REFLUX DISEASE WITHOUT ESOPHAGITIS: ICD-10-CM

## 2023-12-26 DIAGNOSIS — Z13.1 DIABETES MELLITUS SCREENING: ICD-10-CM

## 2023-12-26 DIAGNOSIS — E78.5 HYPERLIPIDEMIA, UNSPECIFIED HYPERLIPIDEMIA TYPE: ICD-10-CM

## 2023-12-26 DIAGNOSIS — Z00.00 ENCOUNTER FOR PREVENTIVE HEALTH EXAMINATION: Primary | ICD-10-CM

## 2023-12-26 PROBLEM — Z86.0100 HISTORY OF COLON POLYPS: Status: ACTIVE | Noted: 2023-12-26

## 2023-12-26 PROCEDURE — 3079F PR MOST RECENT DIASTOLIC BLOOD PRESSURE 80-89 MM HG: ICD-10-PCS | Mod: CPTII,S$GLB,, | Performed by: FAMILY MEDICINE

## 2023-12-26 PROCEDURE — 3077F PR MOST RECENT SYSTOLIC BLOOD PRESSURE >= 140 MM HG: ICD-10-PCS | Mod: CPTII,S$GLB,, | Performed by: FAMILY MEDICINE

## 2023-12-26 PROCEDURE — 99999 PR PBB SHADOW E&M-EST. PATIENT-LVL V: ICD-10-PCS | Mod: PBBFAC,,, | Performed by: FAMILY MEDICINE

## 2023-12-26 PROCEDURE — 99397 PER PM REEVAL EST PAT 65+ YR: CPT | Mod: S$GLB,,, | Performed by: FAMILY MEDICINE

## 2023-12-26 PROCEDURE — 3008F BODY MASS INDEX DOCD: CPT | Mod: CPTII,S$GLB,, | Performed by: FAMILY MEDICINE

## 2023-12-26 PROCEDURE — 1101F PR PT FALLS ASSESS DOC 0-1 FALLS W/OUT INJ PAST YR: ICD-10-PCS | Mod: CPTII,S$GLB,, | Performed by: FAMILY MEDICINE

## 2023-12-26 PROCEDURE — 99397 PR PREVENTIVE VISIT,EST,65 & OVER: ICD-10-PCS | Mod: S$GLB,,, | Performed by: FAMILY MEDICINE

## 2023-12-26 PROCEDURE — 1160F RVW MEDS BY RX/DR IN RCRD: CPT | Mod: CPTII,S$GLB,, | Performed by: FAMILY MEDICINE

## 2023-12-26 PROCEDURE — 3008F PR BODY MASS INDEX (BMI) DOCUMENTED: ICD-10-PCS | Mod: CPTII,S$GLB,, | Performed by: FAMILY MEDICINE

## 2023-12-26 PROCEDURE — 1126F PR PAIN SEVERITY QUANTIFIED, NO PAIN PRESENT: ICD-10-PCS | Mod: CPTII,S$GLB,, | Performed by: FAMILY MEDICINE

## 2023-12-26 PROCEDURE — 3077F SYST BP >= 140 MM HG: CPT | Mod: CPTII,S$GLB,, | Performed by: FAMILY MEDICINE

## 2023-12-26 PROCEDURE — 1126F AMNT PAIN NOTED NONE PRSNT: CPT | Mod: CPTII,S$GLB,, | Performed by: FAMILY MEDICINE

## 2023-12-26 PROCEDURE — 1159F PR MEDICATION LIST DOCUMENTED IN MEDICAL RECORD: ICD-10-PCS | Mod: CPTII,S$GLB,, | Performed by: FAMILY MEDICINE

## 2023-12-26 PROCEDURE — 3079F DIAST BP 80-89 MM HG: CPT | Mod: CPTII,S$GLB,, | Performed by: FAMILY MEDICINE

## 2023-12-26 PROCEDURE — 3288F PR FALLS RISK ASSESSMENT DOCUMENTED: ICD-10-PCS | Mod: CPTII,S$GLB,, | Performed by: FAMILY MEDICINE

## 2023-12-26 PROCEDURE — 99999 PR PBB SHADOW E&M-EST. PATIENT-LVL V: CPT | Mod: PBBFAC,,, | Performed by: FAMILY MEDICINE

## 2023-12-26 PROCEDURE — 1159F MED LIST DOCD IN RCRD: CPT | Mod: CPTII,S$GLB,, | Performed by: FAMILY MEDICINE

## 2023-12-26 PROCEDURE — 1101F PT FALLS ASSESS-DOCD LE1/YR: CPT | Mod: CPTII,S$GLB,, | Performed by: FAMILY MEDICINE

## 2023-12-26 PROCEDURE — 1160F PR REVIEW ALL MEDS BY PRESCRIBER/CLIN PHARMACIST DOCUMENTED: ICD-10-PCS | Mod: CPTII,S$GLB,, | Performed by: FAMILY MEDICINE

## 2023-12-26 PROCEDURE — 3288F FALL RISK ASSESSMENT DOCD: CPT | Mod: CPTII,S$GLB,, | Performed by: FAMILY MEDICINE

## 2023-12-26 RX ORDER — DULOXETIN HYDROCHLORIDE 30 MG/1
CAPSULE, DELAYED RELEASE ORAL
Qty: 270 CAPSULE | Refills: 3 | Status: SHIPPED | OUTPATIENT
Start: 2023-12-26

## 2023-12-26 RX ORDER — DULOXETIN HYDROCHLORIDE 30 MG/1
CAPSULE, DELAYED RELEASE ORAL
Qty: 90 CAPSULE | Refills: 3 | Status: SHIPPED | OUTPATIENT
Start: 2023-12-26 | End: 2023-12-26 | Stop reason: SDUPTHER

## 2023-12-26 RX ORDER — OMEPRAZOLE 40 MG/1
40 CAPSULE, DELAYED RELEASE ORAL DAILY
Qty: 90 CAPSULE | Refills: 3 | Status: SHIPPED | OUTPATIENT
Start: 2023-12-26 | End: 2024-12-25

## 2023-12-26 NOTE — PROGRESS NOTES
Subjective:       Patient ID: Gaby Donohue is a 68 y.o. female.    Chief Complaint: Annual Exam (Pt states that she is here for her annual exam )    68-year-old female coming in for annual exam and follow-up on multiple medical problems.  She has a history of major depressive disorder with anxiety in remission controlled on Cymbalta 30 mg three daily, gastroesophageal reflux controlled without esophagitis on Prilosec 40 mg daily, colon polyps with her last colonoscopy September 1, 2023 by Dr. Rangel and a three year recheck recommended for September 2026, and osteoporosis found on bone density testing about a year ago.  Patient has been ambivalent about going on bisphosphonates and went to her gyn who is promoting testosterone treatment for osteoporosis in women.  It is not an approved treatment and her insurance did not cover it.  She had an appointment scheduled for Endocrinology to discuss alternatives but it was canceled due to the patient's development of COVID-19 and the person she had the appointment with who does not treat her particular problem.  She would like to talk to an endocrinologist about her options and recommendations before going onto any kind of drug treatment.  Right now she is taking supplemental calcium and vitamin-D only.  She is due for a Prevnar 13 but her insurance does not cover vaccinations given here so she will get that at [facility].    Past Medical History:  No date: Anxiety  No date: Basal cell carcinoma      Comment:  scalp and lip  No date: Cancer      Comment:  skin ca on face  No date: Colon polyp  No date: Depression  No date: GERD (gastroesophageal reflux disease)  No date: Hyperlipidemia  No date: Squamous cell carcinoma of skin  No date: Wears glasses    Past Surgical History:  No date: CHOLECYSTECTOMY  04/20/2012: COLONOSCOPY      Comment:  Dr. Jaime, 5 year recheck  06/09/2021: COLONOSCOPY; N/A      Comment:  Procedure: COLONOSCOPY;  Surgeon: Yasmeen GREENE  MD Susi;               Location: [facility];  Service: Endoscopy;  Laterality:                N/A;  9/1/2023: COLONOSCOPY; N/A      Comment:  Procedure: COLONOSCOPY;  Surgeon: Yasmeen Goldman MD;  Location: [facility];  Service: Endoscopy;                 Laterality: N/A;  08/17/2012: ESOPHAGOGASTRODUODENOSCOPY      Comment:  Dr. Jaime; gastritis; bx unremarkable  06/09/2021: ESOPHAGOGASTRODUODENOSCOPY; N/A      Comment:  Procedure: EGD (ESOPHAGOGASTRODUODENOSCOPY);  Surgeon:                Yasmeen Goldman MD;  Location: [facility];  Service:                Endoscopy;  Laterality: N/A;  11/02/2013: FRACTURE SURGERY      Comment:  left arm Dr Cooper  No date: laporoscopy  6/26/2023: OPEN REDUCTION AND INTERNAL FIXATION (ORIF) OF INJURY OF   WRIST; Right      Comment:  Procedure: ORIF, WRIST;  Surgeon: Shivam Canela MD;  Location: [facility];  Service: Orthopedics;                 Laterality: Right;  No date: UPPER GASTROINTESTINAL ENDOSCOPY    Review of patient's family history indicates:  Problem: COPD      Relation: Mother          Age of Onset: (Not Specified)  Problem: Emphysema      Relation: Mother          Age of Onset: (Not Specified)  Problem: Heart disease      Relation: Father          Age of Onset: (Not Specified)  Problem: Cancer      Relation: Sister          Age of Onset: (Not Specified)          Comment: skin cancer face  Problem: Cancer      Relation: Sister          Age of Onset: (Not Specified)          Comment: skin cancer face  Problem: Cancer      Relation: Sister          Age of Onset: (Not Specified)          Comment: skin cancer face   Problem: Cancer      Relation: Brother          Age of Onset: (Not Specified)          Comment: skin cancer face   Problem: No Known Problems      Relation: Brother          Age of Onset: (Not Specified)  Problem: No Known Problems      Relation: Daughter          Age of Onset: (Not Specified)  Problem: No Known  Problems      Relation: Daughter          Age of Onset: (Not Specified)  Problem: Colon cancer      Relation: Neg Hx          Age of Onset: (Not Specified)  Problem: Colon polyps      Relation: Neg Hx          Age of Onset: (Not Specified)  Problem: Esophageal cancer      Relation: Neg Hx          Age of Onset: (Not Specified)  Problem: Stomach cancer      Relation: Neg Hx          Age of Onset: (Not Specified)    Social History    Tobacco Use      Smoking status: Former        Packs/day: 0.00        Years: 1.5 packs/day for 21.0 years (31.5 ttl pk-yrs)        Types: Cigarettes        Start date: 1963        Quit date: 1984        Years since quittin.7      Smokeless tobacco: Never    Alcohol use: Yes      Comment: OCC    Drug use: No    Current Outpatient Medications on File Prior to Visit:  omeprazole (PRILOSEC) 40 MG capsule, Take 1 capsule (40 mg total) by mouth 2 (two) times daily before meals for 30 days, THEN 1 capsule (40 mg total) every morning., Disp: 120 capsule, Rfl: 0  [DISCONTINUED] DULoxetine (CYMBALTA) 30 MG capsule, TAKE THREE CAPSULES (90MG) BY MOUTH ONCE DAILY, Disp: 90 capsule, Rfl: 0  [DISCONTINUED] ALPRAZolam (XANAX) 0.25 MG tablet, Take one up to three times daily 30 minutes before takeoff for anxiety with flying (Patient not taking: Reported on 2023), Disp: 20 tablet, Rfl: 0  [DISCONTINUED] ibuprofen (ADVIL,MOTRIN) 600 MG tablet, Take 600 mg by mouth every 6 (six) hours as needed., Disp: , Rfl:   [DISCONTINUED] ondansetron (ZOFRAN-ODT) 4 MG TbDL, Take 1 tablet (4 mg total) by mouth every 6 (six) hours as needed. (Patient not taking: Reported on 2023), Disp: 20 tablet, Rfl: 1  [DISCONTINUED] oxyCODONE-acetaminophen (PERCOCET)  mg per tablet, Take 1 tablet by mouth every 4 (four) hours as needed for Pain. (Patient not taking: Reported on 2023), Disp: 30 tablet, Rfl: 0    No current facility-administered medications on file prior to visit.          Review  of Systems   Constitutional:  Negative for chills, diaphoresis, fatigue, fever and unexpected weight change.   HENT:  Negative for congestion, ear pain, hearing loss, postnasal drip and sinus pressure.    Eyes:  Negative for itching and visual disturbance.   Respiratory:  Negative for cough, chest tightness, shortness of breath and wheezing.    Cardiovascular:  Negative for chest pain, palpitations and leg swelling.   Gastrointestinal:  Negative for abdominal pain, blood in stool, constipation, diarrhea, nausea and vomiting.   Genitourinary:  Negative for dysuria, frequency and hematuria.   Musculoskeletal:  Negative for arthralgias, back pain, joint swelling and myalgias.   Neurological:  Negative for dizziness and headaches.   Hematological:  Negative for adenopathy.   Psychiatric/Behavioral:  Negative for sleep disturbance. The patient is not nervous/anxious.        Objective:      Physical Exam  Vitals and nursing note reviewed.   Constitutional:       General: She is not in acute distress.     Appearance: Normal appearance. She is well-developed. She is not ill-appearing, toxic-appearing or diaphoretic.      Comments: Borderline blood pressure control dropping from a systolic of 172 systolic of 140 after the office visit was over and the diastolic going from 92 to 88.  She states that her blood pressures at home are usually in the 120s over 80s and she has agreed to get several readings for me over the next week and send them in.  Overweight with a BMI of 29.5 she is down 1 lb from her last physical September 13, 2022   HENT:      Head: Normocephalic and atraumatic.      Right Ear: Tympanic membrane, ear canal and external ear normal. There is no impacted cerumen.      Left Ear: Tympanic membrane, ear canal and external ear normal. There is no impacted cerumen.      Nose: Nose normal. No congestion or rhinorrhea.      Mouth/Throat:      Mouth: Mucous membranes are moist.      Pharynx: Oropharynx is clear. No  oropharyngeal exudate or posterior oropharyngeal erythema.   Eyes:      General: No scleral icterus.        Right eye: No discharge.         Left eye: No discharge.      Extraocular Movements: Extraocular movements intact.      Conjunctiva/sclera: Conjunctivae normal.      Pupils: Pupils are equal, round, and reactive to light.   Neck:      Thyroid: No thyromegaly.      Vascular: No carotid bruit or JVD.   Cardiovascular:      Rate and Rhythm: Normal rate and regular rhythm.      Pulses: Normal pulses.      Heart sounds: Normal heart sounds. No murmur heard.     No friction rub. No gallop.   Pulmonary:      Effort: Pulmonary effort is normal. No respiratory distress.      Breath sounds: Normal breath sounds. No stridor. No wheezing, rhonchi or rales.   Chest:      Chest wall: No tenderness.   Abdominal:      General: Bowel sounds are normal. There is no distension.      Palpations: Abdomen is soft. There is no mass.      Tenderness: There is no abdominal tenderness. There is no guarding or rebound.      Hernia: No hernia is present.   Musculoskeletal:         General: No swelling, tenderness, deformity or signs of injury. Normal range of motion.      Cervical back: Normal range of motion and neck supple. No rigidity or tenderness.      Right lower leg: No edema.      Left lower leg: No edema.   Lymphadenopathy:      Cervical: No cervical adenopathy.   Skin:     General: Skin is warm and dry.      Coloration: Skin is not jaundiced or pale.      Findings: No bruising, erythema, lesion or rash.   Neurological:      General: No focal deficit present.      Mental Status: She is alert and oriented to person, place, and time. Mental status is at baseline.      Cranial Nerves: No cranial nerve deficit.      Sensory: No sensory deficit.      Motor: No weakness.      Coordination: Coordination normal.      Gait: Gait normal.      Deep Tendon Reflexes: Reflexes are normal and symmetric. Reflexes normal.   Psychiatric:          Mood and Affect: Mood normal.         Behavior: Behavior normal.         Thought Content: Thought content normal.         Judgment: Judgment normal.         Assessment:       1. Encounter for preventive health examination    2. Osteoporosis, unspecified osteoporosis type, unspecified pathological fracture presence    3. Hyperlipidemia, unspecified hyperlipidemia type    4. Gastroesophageal reflux disease without esophagitis    5. Major depressive disorder with single episode, in full remission    6. Diabetes mellitus screening    7. History of colon polyps    8. BMI 29.0-29.9,adult        Plan:       1. Encounter for preventive health examination  - Hemoglobin A1C; Future  - Comprehensive Metabolic Panel; Future  - Lipid Panel; Future  - CBC Auto Differential; Future    2. Osteoporosis, unspecified osteoporosis type, unspecified pathological fracture presence  Reluctant to go on bisphosphonates since her mother and her sister both had problems with Boniva and Fosamax.  She is also reluctant to go on Dr. Jimenez testosterone treatment.  She would like to talk to an endocrinologist.  - Ambulatory referral/consult to Endocrinology; Future    3. Hyperlipidemia, unspecified hyperlipidemia type  Await lipid panel results  - Comprehensive Metabolic Panel; Future  - Lipid Panel; Future    4. Gastroesophageal reflux disease without esophagitis  Well controlled on low-dose Prilosec  - omeprazole (PRILOSEC) 40 MG capsule; Take 1 capsule (40 mg total) by mouth once daily.  Dispense: 90 capsule; Refill: 3    5. Major depressive disorder with single episode, in full remission  Controlled on Cymbalta  - DULoxetine (CYMBALTA) 30 MG capsule; TAKE THREE CAPSULES (90MG) BY MOUTH ONCE DAILY  Dispense: 270 capsule; Refill: 3    6. Diabetes mellitus screening  Await A1c and fasting glucose  - Hemoglobin A1C; Future    7. History of colon polyps  Due for recheck September 2026 with Dr. Rangel    8. BMI 29.0-29.9,adult  Slowly improving,  target BMI about 27

## 2023-12-26 NOTE — TELEPHONE ENCOUNTER
Endo referral scheduled with patient: 3/15/2024 [address]. Verified with [facility] staff patient Osteoporosis/medications is managed.

## 2023-12-30 ENCOUNTER — LAB VISIT (OUTPATIENT)
Dept: LAB | Facility: HOSPITAL | Age: 68
End: 2023-12-30
Attending: FAMILY MEDICINE
Payer: COMMERCIAL

## 2023-12-30 DIAGNOSIS — E78.5 HYPERLIPIDEMIA, UNSPECIFIED HYPERLIPIDEMIA TYPE: ICD-10-CM

## 2023-12-30 DIAGNOSIS — Z13.1 DIABETES MELLITUS SCREENING: ICD-10-CM

## 2023-12-30 DIAGNOSIS — Z00.00 ENCOUNTER FOR PREVENTIVE HEALTH EXAMINATION: ICD-10-CM

## 2023-12-30 LAB
ALBUMIN SERPL BCP-MCNC: 3.9 G/DL (ref 3.5–5.2)
ALP SERPL-CCNC: 79 U/L (ref 55–135)
ALT SERPL W/O P-5'-P-CCNC: 17 U/L (ref 10–44)
ANION GAP SERPL CALC-SCNC: 7 MMOL/L (ref 8–16)
AST SERPL-CCNC: 21 U/L (ref 10–40)
BASOPHILS # BLD AUTO: 0.06 K/UL (ref 0–0.2)
BASOPHILS NFR BLD: 1.2 % (ref 0–1.9)
BILIRUB SERPL-MCNC: 0.6 MG/DL (ref 0.1–1)
BUN SERPL-MCNC: 10 MG/DL (ref 8–23)
CALCIUM SERPL-MCNC: 9.2 MG/DL (ref 8.7–10.5)
CHLORIDE SERPL-SCNC: 103 MMOL/L (ref 95–110)
CHOLEST SERPL-MCNC: 232 MG/DL (ref 120–199)
CHOLEST/HDLC SERPL: 3.7 {RATIO} (ref 2–5)
CO2 SERPL-SCNC: 28 MMOL/L (ref 23–29)
CREAT SERPL-MCNC: 0.6 MG/DL (ref 0.5–1.4)
DIFFERENTIAL METHOD BLD: NORMAL
EOSINOPHIL # BLD AUTO: 0.1 K/UL (ref 0–0.5)
EOSINOPHIL NFR BLD: 2.5 % (ref 0–8)
ERYTHROCYTE [DISTWIDTH] IN BLOOD BY AUTOMATED COUNT: 12 % (ref 11.5–14.5)
EST. GFR  (NO RACE VARIABLE): >60 ML/MIN/1.73 M^2
ESTIMATED AVG GLUCOSE: 114 MG/DL (ref 68–131)
GLUCOSE SERPL-MCNC: 101 MG/DL (ref 70–110)
HBA1C MFR BLD: 5.6 % (ref 4.5–6.2)
HCT VFR BLD AUTO: 44.8 % (ref 37–48.5)
HDLC SERPL-MCNC: 62 MG/DL (ref 40–75)
HDLC SERPL: 26.7 % (ref 20–50)
HGB BLD-MCNC: 14.6 G/DL (ref 12–16)
IMM GRANULOCYTES # BLD AUTO: 0.01 K/UL (ref 0–0.04)
IMM GRANULOCYTES NFR BLD AUTO: 0.2 % (ref 0–0.5)
LDLC SERPL CALC-MCNC: 154.2 MG/DL (ref 63–159)
LYMPHOCYTES # BLD AUTO: 1.8 K/UL (ref 1–4.8)
LYMPHOCYTES NFR BLD: 35.3 % (ref 18–48)
MCH RBC QN AUTO: 30.7 PG (ref 27–31)
MCHC RBC AUTO-ENTMCNC: 32.6 G/DL (ref 32–36)
MCV RBC AUTO: 94 FL (ref 82–98)
MONOCYTES # BLD AUTO: 0.4 K/UL (ref 0.3–1)
MONOCYTES NFR BLD: 8.3 % (ref 4–15)
NEUTROPHILS # BLD AUTO: 2.7 K/UL (ref 1.8–7.7)
NEUTROPHILS NFR BLD: 52.5 % (ref 38–73)
NONHDLC SERPL-MCNC: 170 MG/DL
NRBC BLD-RTO: 0 /100 WBC
PLATELET # BLD AUTO: 296 K/UL (ref 150–450)
PMV BLD AUTO: 9.9 FL (ref 9.2–12.9)
POTASSIUM SERPL-SCNC: 4.3 MMOL/L (ref 3.5–5.1)
PROT SERPL-MCNC: 6.6 G/DL (ref 6–8.4)
RBC # BLD AUTO: 4.75 M/UL (ref 4–5.4)
SODIUM SERPL-SCNC: 138 MMOL/L (ref 136–145)
TRIGL SERPL-MCNC: 79 MG/DL (ref 30–150)
WBC # BLD AUTO: 5.16 K/UL (ref 3.9–12.7)

## 2023-12-30 PROCEDURE — 85025 COMPLETE CBC W/AUTO DIFF WBC: CPT | Performed by: FAMILY MEDICINE

## 2023-12-30 PROCEDURE — 83036 HEMOGLOBIN GLYCOSYLATED A1C: CPT | Performed by: FAMILY MEDICINE

## 2023-12-30 PROCEDURE — 36415 COLL VENOUS BLD VENIPUNCTURE: CPT | Performed by: FAMILY MEDICINE

## 2023-12-30 PROCEDURE — 80061 LIPID PANEL: CPT | Performed by: FAMILY MEDICINE

## 2023-12-30 PROCEDURE — 80053 COMPREHEN METABOLIC PANEL: CPT | Performed by: FAMILY MEDICINE

## 2024-01-05 DIAGNOSIS — E78.5 HYPERLIPIDEMIA, UNSPECIFIED HYPERLIPIDEMIA TYPE: Primary | ICD-10-CM

## 2024-01-17 NOTE — LETTER
June 1, 2020    Gaby ANYA Donohue  [address] [address]             [facility]  [address]  [facility] [address]  Phone: [phone] [facility] is committed to your overall health.  To help you get the most out of each of your visits, we will review your information to make sure you are up to date on all of your recommended tests and/or procedures.      Dr. Kenn Garnica MD has found that your chart shows you may be due for a:    MAMMOGRAM (BREAST CANCER SCREENING)   BONE MINERAL DENSITY SCAN (DEXA)  COLORECTAL CANCER SCREENING       If you have had any of the above done at another facility, please bring the records or information with you so that your record at [facility] will be complete.  If you would like to schedule any of these, please contact the clinic at [phone].    If you are currently taking medication, please bring it with you to your appointment for review.    Also, if you have any type of Advanced Directives, please bring them with you to your office visit so we may scan them into your chart.    Thank You,    Your [facility] Team,  MD Jina Patel LPN Clinical Care Coordinator  [facility]  [address] [facility] [address]  Phone [phone]  Fax [phone]        Detail Level: Detailed Detail Level: Zone

## 2024-03-15 ENCOUNTER — OFFICE VISIT (OUTPATIENT)
Dept: ENDOCRINOLOGY | Facility: CLINIC | Age: 69
End: 2024-03-15
Attending: FAMILY MEDICINE
Payer: COMMERCIAL

## 2024-03-15 VITALS
OXYGEN SATURATION: 96 % | HEIGHT: 64 IN | SYSTOLIC BLOOD PRESSURE: 130 MMHG | WEIGHT: 166 LBS | DIASTOLIC BLOOD PRESSURE: 86 MMHG | HEART RATE: 88 BPM | BODY MASS INDEX: 28.34 KG/M2

## 2024-03-15 DIAGNOSIS — M81.0 OSTEOPOROSIS, UNSPECIFIED OSTEOPOROSIS TYPE, UNSPECIFIED PATHOLOGICAL FRACTURE PRESENCE: Primary | ICD-10-CM

## 2024-03-15 DIAGNOSIS — Z78.0 POSTMENOPAUSAL: ICD-10-CM

## 2024-03-15 DIAGNOSIS — E55.9 HYPOVITAMINOSIS D: ICD-10-CM

## 2024-03-15 PROCEDURE — 1126F AMNT PAIN NOTED NONE PRSNT: CPT | Mod: CPTII,S$GLB,, | Performed by: PHYSICIAN ASSISTANT

## 2024-03-15 PROCEDURE — 1101F PT FALLS ASSESS-DOCD LE1/YR: CPT | Mod: CPTII,S$GLB,, | Performed by: PHYSICIAN ASSISTANT

## 2024-03-15 PROCEDURE — 1160F RVW MEDS BY RX/DR IN RCRD: CPT | Mod: CPTII,S$GLB,, | Performed by: PHYSICIAN ASSISTANT

## 2024-03-15 PROCEDURE — 99203 OFFICE O/P NEW LOW 30 MIN: CPT | Mod: S$GLB,,, | Performed by: PHYSICIAN ASSISTANT

## 2024-03-15 PROCEDURE — 3079F DIAST BP 80-89 MM HG: CPT | Mod: CPTII,S$GLB,, | Performed by: PHYSICIAN ASSISTANT

## 2024-03-15 PROCEDURE — 3288F FALL RISK ASSESSMENT DOCD: CPT | Mod: CPTII,S$GLB,, | Performed by: PHYSICIAN ASSISTANT

## 2024-03-15 PROCEDURE — 3075F SYST BP GE 130 - 139MM HG: CPT | Mod: CPTII,S$GLB,, | Performed by: PHYSICIAN ASSISTANT

## 2024-03-15 PROCEDURE — 1159F MED LIST DOCD IN RCRD: CPT | Mod: CPTII,S$GLB,, | Performed by: PHYSICIAN ASSISTANT

## 2024-03-15 PROCEDURE — 99999 PR PBB SHADOW E&M-EST. PATIENT-LVL III: CPT | Mod: PBBFAC,,, | Performed by: PHYSICIAN ASSISTANT

## 2024-03-15 PROCEDURE — 3008F BODY MASS INDEX DOCD: CPT | Mod: CPTII,S$GLB,, | Performed by: PHYSICIAN ASSISTANT

## 2024-03-15 NOTE — PROGRESS NOTES
"CC: Osteoporosis    HPI: Gaby Donohue is a 68 y.o. female here for osteoporosis along with pending conditions listed in the Visit Diagnosis. New to endocrine. Diagnosed by her GYN. Fhx in mother and sister. Works w/ a  2x weekly for 1 hour for the last 3 months. On caltrate. GYN offered testosterone for tx.     Fx:  No fragility fractures  left wrist-fall  Rt wrist-subway incident    Dexa 10/22  Spine: -1.7  Hip: -2.8    PMHx, PSHx: reviewed in epic.  Social Hx: no ETOH/tobacco use.     Wt Readings from Last 10 Encounters:   03/15/24 75.3 kg (166 lb 0.1 oz)   12/26/23 78 kg (172 lb 1.1 oz)   09/19/23 78 kg (171 lb 15.3 oz)   08/07/23 79.4 kg (175 lb)   08/04/23 79.8 kg (175 lb 14.8 oz)   07/28/23 79.8 kg (175 lb 14.8 oz)   07/07/23 79.8 kg (175 lb 14.8 oz)   06/26/23 79.8 kg (175 lb 14.8 oz)   06/23/23 79.8 kg (175 lb 14.8 oz)   06/16/23 79.8 kg (175 lb 14.8 oz)      ROS:   Constitutional: + wt loss (6 lbs).   Eyes: No recent visual changes  Cardiovascular: Denies current anginal symptoms  Respiratory: Denies current respiratory difficulty  Gastrointestinal: Denies recent bowel disturbances  GenitoUrinary - No dysuria  Skin: No new skin rash  Neurologic: No focal neurologic complaints  Musculoskeletal: no joint pain  Endocrine: no polyphagia, polydipsia or polyuria  Remainder ROS negative     /86 (BP Location: Right arm, Patient Position: Sitting, BP Method: Small (Manual))   Pulse 88   Ht 5' 4" (1.626 m)   Wt 75.3 kg (166 lb 0.1 oz)   SpO2 96%   BMI 28.49 kg/m²      Personally reviewed labs below:    Lab Results   Component Value Date    TSH 2.076 06/23/2023        Chemistry        Component Value Date/Time     12/30/2023 0851    K 4.3 12/30/2023 0851     12/30/2023 0851    CO2 28 12/30/2023 0851    BUN 10 12/30/2023 0851    CREATININE 0.6 12/30/2023 0851     12/30/2023 0851        Component Value Date/Time    CALCIUM 9.2 12/30/2023 0851    ALKPHOS 79 12/30/2023 0851    AST 21 " 12/30/2023 0851    ALT 17 12/30/2023 0851    BILITOT 0.6 12/30/2023 0851    ESTGFRAFRICA >60.0 06/19/2020 0748    EGFRNONAA >60.0 06/19/2020 0748         Lab Results   Component Value Date    HGBA1C 5.6 12/30/2023    HGBA1C 5.1 12/29/2017      PE:  GENERAL: Well developed, well nourished  NEURO: steady gait, CN ll-Xll grossly intact  PSYCH: normal mood and affect    Assessment/Plan:   1. Osteoporosis, unspecified osteoporosis type, unspecified pathological fracture presence  Ambulatory referral/consult to Endocrinology    PTH, Intact    Calcium, Ionized    Renal Function Panel    Protein Electrophoresis, Serum    Protein Electrophoresis, Random Urine    Comprehensive Metabolic Panel      2. Hypovitaminosis D  Vitamin D    Vitamin D      3. Postmenopausal  DXA Bone Density Axial Skeleton 1 or more sites         Osteoporosis  Discussed medications for tx-fosamax, reclast, evista, evenity, forteo.  Pt want to do research.  Recommend fosamax or reclast.  Check for secondary causes  TSH is wnl.  Repeat dexa next time.    Hypovitaminosis d-check vd    Labs today  F/u in six months-cmp, vd and dexa

## 2024-05-16 ENCOUNTER — TELEPHONE (OUTPATIENT)
Dept: FAMILY MEDICINE | Facility: CLINIC | Age: 69
End: 2024-05-16
Payer: COMMERCIAL

## 2024-05-16 RX ORDER — DULOXETIN HYDROCHLORIDE 30 MG/1
30 CAPSULE, DELAYED RELEASE ORAL DAILY
Qty: 90 CAPSULE | Refills: 3 | Status: SHIPPED | OUTPATIENT
Start: 2024-05-16 | End: 2025-05-16

## 2024-05-16 RX ORDER — DULOXETIN HYDROCHLORIDE 60 MG/1
60 CAPSULE, DELAYED RELEASE ORAL DAILY
Qty: 90 CAPSULE | Refills: 3 | Status: SHIPPED | OUTPATIENT
Start: 2024-05-16 | End: 2025-05-16

## 2024-05-16 NOTE — TELEPHONE ENCOUNTER
Insurance denied coverage of Cymbalta 90 mg using 3 of the 30 mg. Will have to change to 60mg and a 30mg. Send to [facility].

## 2024-05-16 NOTE — TELEPHONE ENCOUNTER
Spoke to [facility]   Her insurance will not pay for 3  30 mg tablets  of Cymbalta a day   She is out of medication  Can her dosing be change to different doses to get covered      ----- Message from Miguelina Membreno sent at 5/16/2024  9:26 AM CDT -----  Regarding: Needs return call  Type: Needs Medical Advice  Who Called:  Pt    Pharmacy name and phone #:    [id number] - [place], [place] - [address] [address]  Phone: [phone] Fax: [phone]          Best Call Back Number: [phone]    Additional Information: Pt needs to speak to the office regarding some medication, she said she was supposed to have refills but doesn't show any for cymbalta, please call patient

## 2024-05-16 NOTE — TELEPHONE ENCOUNTER
No care due was identified.  [facility] Embedded Care Due Messages. Reference number: [id number].   5/16/2024 10:23:08 AM CDT

## 2024-05-21 ENCOUNTER — TELEPHONE (OUTPATIENT)
Dept: FAMILY MEDICINE | Facility: CLINIC | Age: 69
End: 2024-05-21
Payer: COMMERCIAL

## 2024-05-21 NOTE — TELEPHONE ENCOUNTER
Spoke with patient.    She is requesting a prescription for Ditropan.   She has been having a problems with urgency and frequency.  [facility]   Please advise

## 2024-05-21 NOTE — TELEPHONE ENCOUNTER
----- Message from Fern Valenzuela sent at 5/21/2024  8:27 AM CDT -----  Contact: self  Pt wants to speak to the nurse in regards to a medicine she might want to start on.  Call back at [phone] and thanks   lmom for pt to call back

## 2024-05-22 ENCOUNTER — TELEPHONE (OUTPATIENT)
Dept: FAMILY MEDICINE | Facility: CLINIC | Age: 69
End: 2024-05-22
Payer: COMMERCIAL

## 2024-05-22 NOTE — TELEPHONE ENCOUNTER
----- Message from Fallon Kirby sent at 5/22/2024  3:32 PM CDT -----  Contact: self  Type:  Patient Returning Call    Who Called:self  Who Left Message for Patient:rd  Does the patient know what this is regarding?:yes, medication   Would the patient rather a call back or a response via [facility]? call  Best Call Back Number:[phone] (home)     Additional Information: please advise and thank you.

## 2024-06-12 ENCOUNTER — TELEPHONE (OUTPATIENT)
Dept: FAMILY MEDICINE | Facility: CLINIC | Age: 69
End: 2024-06-12
Payer: COMMERCIAL

## 2024-06-12 NOTE — TELEPHONE ENCOUNTER
----- Message from Kimberly Matthews sent at 6/12/2024  8:59 AM CDT -----  Regarding: specimen question  Contact: pt  Type:  Needs Medical Advice    Who Called: pt    Would the patient rather a call back or a response via [facility]? Call back    Best Call Back Number: [phone]    Additional Information: pt had questions about a specimen.

## 2024-06-12 NOTE — TELEPHONE ENCOUNTER
----- Message from Nikole Bell sent at 6/12/2024 11:16 AM CDT -----  Contact: pt  Type:  Patient Returning Call    Who Called:pt     Who Left Message for Patient: marija    Does the patient know what this is regarding?: appt    Would the patient rather a call back or a response via [facility]?  Call back    Best Call Back Number:[phone]    Additional Information: please call to advise  Thanks

## 2024-06-21 ENCOUNTER — OFFICE VISIT (OUTPATIENT)
Dept: FAMILY MEDICINE | Facility: CLINIC | Age: 69
End: 2024-06-21
Attending: FAMILY MEDICINE
Payer: COMMERCIAL

## 2024-06-21 VITALS
SYSTOLIC BLOOD PRESSURE: 128 MMHG | DIASTOLIC BLOOD PRESSURE: 82 MMHG | HEIGHT: 64 IN | WEIGHT: 168 LBS | OXYGEN SATURATION: 97 % | TEMPERATURE: 98 F | BODY MASS INDEX: 28.68 KG/M2 | HEART RATE: 87 BPM

## 2024-06-21 DIAGNOSIS — N39.46 MIXED INCONTINENCE URGE AND STRESS: Primary | ICD-10-CM

## 2024-06-21 LAB
BILIRUBIN, UA POC OHS: NEGATIVE
BLOOD, UA POC OHS: NEGATIVE
CLARITY, UA POC OHS: CLEAR
COLOR, UA POC OHS: YELLOW
GLUCOSE, UA POC OHS: NEGATIVE
KETONES, UA POC OHS: NEGATIVE
LEUKOCYTES, UA POC OHS: NEGATIVE
NITRITE, UA POC OHS: NEGATIVE
PH, UA POC OHS: 5.5
PROTEIN, UA POC OHS: NEGATIVE
SPECIFIC GRAVITY, UA POC OHS: 1.01
UROBILINOGEN, UA POC OHS: 0.2

## 2024-06-21 PROCEDURE — 99999 PR PBB SHADOW E&M-EST. PATIENT-LVL III: CPT | Mod: PBBFAC,,, | Performed by: FAMILY MEDICINE

## 2024-06-21 RX ORDER — SOLIFENACIN SUCCINATE 5 MG/1
5 TABLET, FILM COATED ORAL DAILY
Qty: 30 TABLET | Refills: 11 | Status: SHIPPED | OUTPATIENT
Start: 2024-06-21 | End: 2025-06-21

## 2024-06-21 NOTE — PROGRESS NOTES
Subjective:       Patient ID: Gaby Donohue is a 69 y.o. female.    Chief Complaint: Urinary Frequency    69-year-old female with a proximally a one year history of mixed urge and stress incontinence.  She will sometimes lose control with a cough or sneeze and she has been wearing pads for about the last year.  This is coupled with an increasing sense of urgency as the bladder fills.  She is taking duloxetine which has a small chance of urinary frequency and urgency and also reported to cause some urinary retention which could result in incomplete voiding and a change in frequency.  She has not tried any medications and she has not been on any estrogen creams.  She does not note any burning on urination, change in color or cloudiness, or odor to the urine and has not been prone to urinary tract infections.  She has no fever or chills, no flank pain and no blood in the urine.  She has been talking to friends in his interested in trying Ditropan or VESIcare.  She has not done any pelvic floor exercises but she goes to the gym regularly where her  in a group of friends sometimes meet and discuss exercises to help control some of the complications of aging but she is not a trained pelvic floor physical therapist.  I mentioned that as a possibility and she seemed to be somewhat interested.  We briefly discussed Kegel exercises and the weighted vaginal cones.  She declined to try vaginal estrogen cream as she is uncomfortable with using hormones.    Past Medical History:  No date: Anxiety  No date: Basal cell carcinoma      Comment:  scalp and lip  No date: Cancer      Comment:  skin ca on face  No date: Colon polyp  No date: Depression  No date: GERD (gastroesophageal reflux disease)  No date: Hyperlipidemia  No date: Squamous cell carcinoma of skin  No date: Wears glasses    Past Surgical History:  No date: CHOLECYSTECTOMY  04/20/2012: COLONOSCOPY      Comment:  Dr. Jaime, 5 year recheck  06/09/2021:  COLONOSCOPY; N/A      Comment:  Procedure: COLONOSCOPY;  Surgeon: Yasmeen Goldman MD;               Location: [facility] ENDO;  Service: Endoscopy;  Laterality:                N/A;  9/1/2023: COLONOSCOPY; N/A      Comment:  Procedure: COLONOSCOPY;  Surgeon: Yasmeen Goldman MD;  Location: [facility] ENDO;  Service: Endoscopy;                 Laterality: N/A;  08/17/2012: ESOPHAGOGASTRODUODENOSCOPY      Comment:  Dr. Jaime; gastritis; bx unremarkable  06/09/2021: ESOPHAGOGASTRODUODENOSCOPY; N/A      Comment:  Procedure: EGD (ESOPHAGOGASTRODUODENOSCOPY);  Surgeon:                Yasmeen Goldman MD;  Location: [facility] ENDO;  Service:                Endoscopy;  Laterality: N/A;  11/02/2013: FRACTURE SURGERY      Comment:  left arm Dr Cooper  No date: laporoscopy  6/26/2023: OPEN REDUCTION AND INTERNAL FIXATION (ORIF) OF INJURY OF   WRIST; Right      Comment:  Procedure: ORIF, WRIST;  Surgeon: Shivam Canela MD;  Location: [facility] OR;  Service: Orthopedics;                 Laterality: Right;  No date: UPPER GASTROINTESTINAL ENDOSCOPY    Current Outpatient Medications on File Prior to Visit:  DULoxetine (CYMBALTA) 30 MG capsule, Take 1 capsule (30 mg total) by mouth once daily., Disp: 90 capsule, Rfl: 3  DULoxetine (CYMBALTA) 60 MG capsule, Take 1 capsule (60 mg total) by mouth once daily., Disp: 90 capsule, Rfl: 3  omeprazole (PRILOSEC) 40 MG capsule, Take 1 capsule (40 mg total) by mouth once daily., Disp: 90 capsule, Rfl: 3    No current facility-administered medications on file prior to visit.          Review of Systems   Genitourinary:  Positive for urgency.        Mixed incontinence       Objective:      Physical Exam  Vitals and nursing note reviewed.   Abdominal:      General: Bowel sounds are normal. There is no distension.      Palpations: Abdomen is soft. There is no mass.      Tenderness: There is no abdominal tenderness. There is no right CVA tenderness, left CVA tenderness,  guarding or rebound.      Hernia: No hernia is present.         Assessment:       1. Mixed incontinence urge and stress        Plan:     1. Mixed incontinence urge and stress  Point of care urine is negative for any signs of infection, no proteinuria.  Patient to consider Kegel exercises and have offered a physical therapy consult for pelvic floor exercise.  She will consider the estrogen cream and wants to do a little research on it.  Both Ditropan and VESIcare were preferred level one on her insurance and her friends were in favor the VESIcare so we went with the VESIcare 5 mg daily to help with her urge symptoms.  Briefly discussed timed voiding.  I also did a little research on her duloxetine which has a small chance of urgency, frequency, and retention.  It is working well for her and she is happy with it so the benefits appear to outweigh the potential liabilities.  - POCT Urinalysis(Instrument)

## 2024-09-24 ENCOUNTER — TELEPHONE (OUTPATIENT)
Dept: FAMILY MEDICINE | Facility: CLINIC | Age: 69
End: 2024-09-24
Payer: COMMERCIAL

## 2024-09-24 NOTE — TELEPHONE ENCOUNTER
Spoke to patient   She states she does not need a letter           ----- Message from Shannan Huerta sent at 9/24/2024  8:45 AM CDT -----  Regarding: advice  Contact: patient  Type: Needs Medical Advice  Who Called:  patient  Symptoms (please be specific):    How long has patient had these symptoms:    Pharmacy name and phone #:    Best Call Back Number: [phone] (home)     Additional Information: Patient would like a note concerning her brother.  Please call patient to advise.  Thanks!

## 2024-11-26 ENCOUNTER — TELEPHONE (OUTPATIENT)
Dept: FAMILY MEDICINE | Facility: CLINIC | Age: 69
End: 2024-11-26
Payer: COMMERCIAL

## 2024-12-27 NOTE — TELEPHONE ENCOUNTER
8 days ago     CLAIRE Griffin,          I just left a voice mail for you. 's official last day before retiring is 12/18. I had to reschedule you for 12/27 at 10:30 with Qi Calhoun NP at [facility] [address]. I will retire also at the end of next month. I wish you well in the future. It was my pleasure having you as my patient all of these years.     Take care,  Kelly    This [employer]t message has not been read.      In an effort to ensure that our patients [employer], a Team Member has reviewed your chart and identified an opportunity to provide the best care possible. An attempt was made to discuss or schedule due or overdue Preventive or Chronic Condition care.Care Gaps identified: Wellness Visits.    The Outcome was Contact was made, a Primary Care Visit was scheduled.   Type of Appointment needed: Primary Care Visit     Pt completed a physical with Rubia Reese on 10/15/24.

## 2025-01-03 ENCOUNTER — TELEPHONE (OUTPATIENT)
Dept: FAMILY MEDICINE | Facility: CLINIC | Age: 70
End: 2025-01-03
Payer: COMMERCIAL

## 2025-01-03 DIAGNOSIS — K21.9 GASTROESOPHAGEAL REFLUX DISEASE WITHOUT ESOPHAGITIS: ICD-10-CM

## 2025-01-03 RX ORDER — OMEPRAZOLE 40 MG/1
40 CAPSULE, DELAYED RELEASE ORAL
Qty: 30 CAPSULE | Refills: 0 | Status: SHIPPED | OUTPATIENT
Start: 2025-01-03

## 2025-01-03 NOTE — TELEPHONE ENCOUNTER
This has been fully explained to the patient, who indicates understanding.  States she will call to schedule with a new pcpc

## 2025-01-28 DIAGNOSIS — Z12.31 ENCOUNTER FOR SCREENING MAMMOGRAM FOR MALIGNANT NEOPLASM OF BREAST: Primary | ICD-10-CM

## 2025-02-10 ENCOUNTER — HOSPITAL ENCOUNTER (OUTPATIENT)
Dept: RADIOLOGY | Facility: HOSPITAL | Age: 70
Discharge: HOME | End: 2025-02-10
Attending: OBSTETRICS & GYNECOLOGY
Payer: COMMERCIAL

## 2025-02-10 ENCOUNTER — HOSPITAL ENCOUNTER (OUTPATIENT)
Dept: RADIOLOGY | Facility: HOSPITAL | Age: 70
Discharge: HOME | End: 2025-02-10
Attending: PHYSICIAN ASSISTANT
Payer: COMMERCIAL

## 2025-02-10 DIAGNOSIS — Z12.31 ENCOUNTER FOR SCREENING MAMMOGRAM FOR MALIGNANT NEOPLASM OF BREAST: ICD-10-CM

## 2025-02-10 DIAGNOSIS — Z78.0 POSTMENOPAUSAL: ICD-10-CM

## 2025-02-10 PROCEDURE — 77063 BREAST TOMOSYNTHESIS BI: CPT | Mod: 26,,, | Performed by: RADIOLOGY

## 2025-02-10 PROCEDURE — 77080 DXA BONE DENSITY AXIAL: CPT | Mod: TC,PO

## 2025-02-10 PROCEDURE — 77067 SCR MAMMO BI INCL CAD: CPT | Mod: 26,,, | Performed by: RADIOLOGY

## 2025-02-10 PROCEDURE — 77080 DXA BONE DENSITY AXIAL: CPT | Mod: 26,,, | Performed by: RADIOLOGY

## 2025-02-10 PROCEDURE — 77067 SCR MAMMO BI INCL CAD: CPT | Mod: TC,PO

## 2025-02-12 ENCOUNTER — OFFICE VISIT (OUTPATIENT)
Dept: FAMILY MEDICINE | Facility: CLINIC | Age: 70
End: 2025-02-12
Payer: COMMERCIAL

## 2025-02-12 VITALS
WEIGHT: 171.31 LBS | HEIGHT: 64 IN | BODY MASS INDEX: 29.24 KG/M2 | TEMPERATURE: 98 F | OXYGEN SATURATION: 97 % | SYSTOLIC BLOOD PRESSURE: 128 MMHG | DIASTOLIC BLOOD PRESSURE: 88 MMHG | HEART RATE: 88 BPM

## 2025-02-12 DIAGNOSIS — K21.9 GASTROESOPHAGEAL REFLUX DISEASE WITHOUT ESOPHAGITIS: ICD-10-CM

## 2025-02-12 DIAGNOSIS — R03.0 ELEVATED BLOOD PRESSURE READING IN OFFICE WITHOUT DIAGNOSIS OF HYPERTENSION: Primary | ICD-10-CM

## 2025-02-12 DIAGNOSIS — E78.5 HYPERLIPIDEMIA, UNSPECIFIED HYPERLIPIDEMIA TYPE: ICD-10-CM

## 2025-02-12 DIAGNOSIS — F32.5 MAJOR DEPRESSIVE DISORDER WITH SINGLE EPISODE, IN FULL REMISSION: ICD-10-CM

## 2025-02-12 DIAGNOSIS — M81.0 AGE-RELATED OSTEOPOROSIS WITHOUT CURRENT PATHOLOGICAL FRACTURE: ICD-10-CM

## 2025-02-12 DIAGNOSIS — Z13.1 DIABETES MELLITUS SCREENING: ICD-10-CM

## 2025-02-12 DIAGNOSIS — N32.81 OAB (OVERACTIVE BLADDER): ICD-10-CM

## 2025-02-12 PROCEDURE — 99999 PR PBB SHADOW E&M-EST. PATIENT-LVL III: CPT | Mod: PBBFAC,,,

## 2025-02-12 PROCEDURE — 3074F SYST BP LT 130 MM HG: CPT | Mod: CPTII,S$GLB,,

## 2025-02-12 PROCEDURE — 99214 OFFICE O/P EST MOD 30 MIN: CPT | Mod: S$GLB,,,

## 2025-02-12 PROCEDURE — 3008F BODY MASS INDEX DOCD: CPT | Mod: CPTII,S$GLB,,

## 2025-02-12 PROCEDURE — 3079F DIAST BP 80-89 MM HG: CPT | Mod: CPTII,S$GLB,,

## 2025-02-12 PROCEDURE — 1160F RVW MEDS BY RX/DR IN RCRD: CPT | Mod: CPTII,S$GLB,,

## 2025-02-12 PROCEDURE — 3288F FALL RISK ASSESSMENT DOCD: CPT | Mod: CPTII,S$GLB,,

## 2025-02-12 PROCEDURE — 1101F PT FALLS ASSESS-DOCD LE1/YR: CPT | Mod: CPTII,S$GLB,,

## 2025-02-12 PROCEDURE — 1126F AMNT PAIN NOTED NONE PRSNT: CPT | Mod: CPTII,S$GLB,,

## 2025-02-12 PROCEDURE — 1159F MED LIST DOCD IN RCRD: CPT | Mod: CPTII,S$GLB,,

## 2025-02-12 RX ORDER — DULOXETIN HYDROCHLORIDE 60 MG/1
60 CAPSULE, DELAYED RELEASE ORAL DAILY
Qty: 90 CAPSULE | Refills: 3 | Status: SHIPPED | OUTPATIENT
Start: 2025-02-12 | End: 2026-02-12

## 2025-02-12 RX ORDER — OMEPRAZOLE 40 MG/1
40 CAPSULE, DELAYED RELEASE ORAL EVERY MORNING
Qty: 90 CAPSULE | Refills: 0 | Status: SHIPPED | OUTPATIENT
Start: 2025-02-12

## 2025-02-12 RX ORDER — SOLIFENACIN SUCCINATE 5 MG/1
5 TABLET, FILM COATED ORAL DAILY
Qty: 90 TABLET | Refills: 3 | Status: SHIPPED | OUTPATIENT
Start: 2025-02-12 | End: 2026-02-12

## 2025-02-12 RX ORDER — DULOXETIN HYDROCHLORIDE 30 MG/1
30 CAPSULE, DELAYED RELEASE ORAL DAILY
Qty: 90 CAPSULE | Refills: 3 | Status: SHIPPED | OUTPATIENT
Start: 2025-02-12 | End: 2026-02-12

## 2025-02-12 NOTE — PROGRESS NOTES
[facility]     Subjective:       Patient ID:  [id number]     Chief Complaint: Follow-up    Gaby Donohue is a 69 y.o. female with a past medical history significant for HLD, osteoporosis, and MDD who presents to the clinic for annual.     The patient has been doing well overall. Per chart review, it was noted that a DEXA scan showed osteoporosis. The patient is hesitant about starting medication and has followed up with endocrinology, but is still uncertain about how to proceed. She had lab work scheduled through endocrinology but has not obtained these yet. She would like to see a physician  endocrinologist for further recommendations. The patient has no other complaints today and is requesting a medication refill. Care gaps were discussed, and she receives her vaccines through work and [employer].      Past Medical History:   Diagnosis Date    Anxiety     Basal cell carcinoma     scalp and lip    Cancer     skin ca on face    Colon polyp     Depression     GERD (gastroesophageal reflux disease)     Hyperlipidemia     Squamous cell carcinoma of skin     Wears glasses       Review of patient's allergies indicates:   Allergen Reactions    No known drug allergies        Lab Results   Component Value Date    WBC 5.16 12/30/2023    HGB 14.6 12/30/2023    HCT 44.8 12/30/2023     12/30/2023    CHOL 232 (H) 12/30/2023    TRIG 79 12/30/2023    HDL 62 12/30/2023    ALT 17 12/30/2023    AST 21 12/30/2023     12/30/2023    K 4.3 12/30/2023     12/30/2023    CREATININE 0.6 12/30/2023    BUN 10 12/30/2023    CO2 28 12/30/2023    TSH 2.076 06/23/2023    HGBA1C 5.6 12/30/2023       Review of Systems   Constitutional:  Negative for chills and fever.   Respiratory:  Negative for cough and shortness of breath.    Cardiovascular:  Negative for chest pain and leg swelling.   Gastrointestinal:  Negative for abdominal pain, diarrhea, nausea and vomiting.         Objective:      Physical  Exam  Constitutional:       General: She is not in acute distress.     Appearance: Normal appearance. She is not toxic-appearing.   HENT:      Head: Normocephalic and atraumatic.      Nose: Nose normal.   Cardiovascular:      Rate and Rhythm: Normal rate and regular rhythm.      Pulses: Normal pulses.      Heart sounds: No murmur heard.     No friction rub.   Pulmonary:      Effort: Pulmonary effort is normal. No respiratory distress.      Breath sounds: Normal breath sounds. No wheezing.   Musculoskeletal:      Cervical back: Normal range of motion.      Right lower leg: No edema.      Left lower leg: No edema.   Skin:     General: Skin is warm and dry.   Neurological:      General: No focal deficit present.      Mental Status: She is alert and oriented to person, place, and time.   Psychiatric:         Mood and Affect: Mood normal.           Assessment:       1. Elevated blood pressure reading in office without diagnosis of hypertension    2. Hyperlipidemia, unspecified hyperlipidemia type    3. Age-related osteoporosis without current pathological fracture    4. Gastroesophageal reflux disease without esophagitis    5. Major depressive disorder with single episode, in full remission    6. OAB (overactive bladder)    7. Diabetes mellitus screening          Plan:       Gaby was seen today for follow-up.    Diagnoses and all orders for this visit:    Elevated blood pressure reading in office without diagnosis of hypertension  Comments:  Improved with repeat. Will continue to monitor.  Orders:  -     Comprehensive Metabolic Panel; Future  -     CBC Auto Differential; Future    Hyperlipidemia, unspecified hyperlipidemia type  Comments:  Screen and treat as indicated.  Orders:  -     LIPID PANEL; Future  -     Comprehensive Metabolic Panel; Future  -     CBC Auto Differential; Future    Age-related osteoporosis without current pathological fracture  Comments:  Patient hesistant about starting medication. She is  holding off on medication until she is able to find a physician in endocrinology.  Orders:  -     Calcitriol; Future    Gastroesophageal reflux disease without esophagitis  Comments:  Stable. Will continue to monitor.  Orders:  -     omeprazole (PRILOSEC) 40 MG capsule; Take 1 capsule (40 mg total) by mouth every morning.    Major depressive disorder with single episode, in full remission  Comments:  Stable. Continue medication as prescribed.  Orders:  -     DULoxetine (CYMBALTA) 30 MG capsule; Take 1 capsule (30 mg total) by mouth once daily.  -     DULoxetine (CYMBALTA) 60 MG capsule; Take 1 capsule (60 mg total) by mouth once daily.    OAB (overactive bladder)  Comments:  Stable. Continue medication as prescribed.  Orders:  -     solifenacin (VESICARE) 5 MG tablet; Take 1 tablet (5 mg total) by mouth once daily.    Diabetes mellitus screening  Comments:  Screen and treat as indicated.  Orders:  -     HEMOGLOBIN A1C; Future             Follow up in about 1 year (around 2/12/2026).    Qi Calhoun PA-C  Family Medicine Physician Assistant          All of your core healthy metrics are met.       I spent a total of 25 minutes on the day of the visit.This includes face to face time and non-face to face time preparing to see the patient (eg, review of tests), obtaining and/or reviewing separately obtained history, documenting clinical information in the electronic or other health record, independently interpreting results and communicating results to the patient/family/caregiver, or care coordinator.

## 2025-02-21 ENCOUNTER — RESULTS FOLLOW-UP (OUTPATIENT)
Dept: ENDOCRINOLOGY | Facility: CLINIC | Age: 70
End: 2025-02-21

## 2025-03-05 ENCOUNTER — OFFICE VISIT (OUTPATIENT)
Dept: FAMILY MEDICINE | Facility: CLINIC | Age: 70
End: 2025-03-05
Payer: COMMERCIAL

## 2025-03-05 VITALS
DIASTOLIC BLOOD PRESSURE: 80 MMHG | WEIGHT: 172.81 LBS | SYSTOLIC BLOOD PRESSURE: 136 MMHG | HEIGHT: 64 IN | BODY MASS INDEX: 29.5 KG/M2 | OXYGEN SATURATION: 97 % | HEART RATE: 91 BPM | TEMPERATURE: 98 F

## 2025-03-05 DIAGNOSIS — F32.5 MAJOR DEPRESSIVE DISORDER WITH SINGLE EPISODE, IN FULL REMISSION: ICD-10-CM

## 2025-03-05 DIAGNOSIS — R07.9 CHEST PAIN, UNSPECIFIED TYPE: Primary | ICD-10-CM

## 2025-03-05 DIAGNOSIS — R06.02 SHORTNESS OF BREATH: ICD-10-CM

## 2025-03-05 DIAGNOSIS — F41.9 ANXIETY: ICD-10-CM

## 2025-03-05 PROCEDURE — 99999 PR PBB SHADOW E&M-EST. PATIENT-LVL IV: CPT | Mod: PBBFAC,,,

## 2025-03-05 PROCEDURE — 93010 ELECTROCARDIOGRAM REPORT: CPT | Mod: S$GLB,,, | Performed by: INTERNAL MEDICINE

## 2025-03-05 RX ORDER — HYDROXYZINE HYDROCHLORIDE 10 MG/1
10 TABLET, FILM COATED ORAL 3 TIMES DAILY PRN
Qty: 90 TABLET | Refills: 0 | Status: SHIPPED | OUTPATIENT
Start: 2025-03-05 | End: 2025-06-03

## 2025-03-05 NOTE — PROGRESS NOTES
[facility]     Subjective:       Patient ID:  [id number]     Chief Complaint: Chest Pain and Shortness of Breath (/)    Gaby Donohue is a 69 y.o. female with a past medical history significant for  HLD, osteoporosis, and MDD who presents to the clinic for chest pain and shortness of breath.     The patient presents to the clinic with complaints of shortness of breath and chest pain that began on Monday. She describes experiencing sudden substernal chest pressure and a subjective sensation of shortness of breath, which she characterizes as an inability to take a deep breath. These symptoms started while she was at rest and not during exertion. She reports that the shortness of breath occurred intermittently throughout the day, with the most recent episode occurring yesterday. The patient denies any associated nausea, vomiting, diaphoresis, or radiation of the pain. She also denies any shortness of breath when lying flat or experiencing edema. While there were no identifiable triggers for the symptoms, she mentions reaching for a tall item at work but denies any current pain with movement or chest tenderness.    Additionally, the patient reports increased emotional stress due to caring for two people and managing a demanding job with significant responsibilities. She has a history of depression and anxiety, for which she takes Cymbalta 90 mg daily, prescribed by her previous primary care provider. She inquires about the possibility of increasing her dose, unsure whether her symptoms may be related to the stress she is currently experiencing.    She currently denies shortness of breath or active chest pain.       Past Medical History:   Diagnosis Date    Anxiety     Basal cell carcinoma     scalp and lip    Cancer     skin ca on face    Colon polyp     Depression     GERD (gastroesophageal reflux disease)     Hyperlipidemia     Squamous cell carcinoma of skin     Wears glasses       Review of  patient's allergies indicates:   Allergen Reactions    No known drug allergies        Lab Results   Component Value Date    WBC 5.16 12/30/2023    HGB 14.6 12/30/2023    HCT 44.8 12/30/2023     12/30/2023    CHOL 232 (H) 12/30/2023    TRIG 79 12/30/2023    HDL 62 12/30/2023    ALT 17 12/30/2023    AST 21 12/30/2023     12/30/2023    K 4.3 12/30/2023     12/30/2023    CREATININE 0.6 12/30/2023    BUN 10 12/30/2023    CO2 28 12/30/2023    TSH 2.076 06/23/2023    HGBA1C 5.6 12/30/2023       Review of Systems   Constitutional:  Negative for chills, diaphoresis and fever.   Respiratory:  Positive for shortness of breath. Negative for cough.    Cardiovascular:  Positive for chest pain. Negative for leg swelling.   Gastrointestinal:  Negative for abdominal pain, diarrhea, nausea and vomiting.   Neurological:  Negative for dizziness, syncope and weakness.         Objective:      Physical Exam  Constitutional:       General: She is not in acute distress.     Appearance: Normal appearance. She is not toxic-appearing.   HENT:      Head: Normocephalic and atraumatic.      Nose: Nose normal.   Cardiovascular:      Rate and Rhythm: Normal rate and regular rhythm.      Pulses: Normal pulses.      Heart sounds: No murmur heard.     No friction rub.      Comments: No carotid bruits appreciated   No JVD or edema  Pulmonary:      Effort: Pulmonary effort is normal. No respiratory distress.      Breath sounds: Normal breath sounds. No wheezing.   Chest:      Chest wall: No tenderness.   Musculoskeletal:      Cervical back: Normal range of motion.      Right lower leg: No edema.      Left lower leg: No edema.   Skin:     General: Skin is warm and dry.   Neurological:      General: No focal deficit present.      Mental Status: She is alert and oriented to person, place, and time.   Psychiatric:         Mood and Affect: Mood normal.           Assessment:       1. Chest pain, unspecified type    2. Shortness of breath     3. Major depressive disorder with single episode, in full remission    4. Anxiety          Plan:       Gaby was seen today for chest pain and shortness of breath.    Diagnoses and all orders for this visit:    Chest pain, unspecified type  Comments:  EKG with no ST changes. Will obtain stress test and echo to rule out cardiac etiology. Recommendations pending results.  Advised patient to go to the ED if she experiences active or worsening chest pain with associated shortness of breath.   Orders:  -     IN OFFICE EKG 12-LEAD (to Muse)  -     Nuclear Stress - Cardiology Interpreted; Future  -     Echo; Future    Shortness of breath  Comments:  Patient not in distress on exam. Will obtain BNP and CXR, if persistent can consider PFT if cardiac workup within normal limits.  Orders:  -     Echo; Future  -     BNP; Future  -     X-Ray Chest PA And Lateral; Future    Major depressive disorder with single episode, in full remission  Comments:  Patient inquires about increasing dose due to external stressors, howver patient currently on 90mg of cymbalta. Continue medication as prescribed.    Anxiety  Comments:  Discussed adding buspar vs hydroxyzine. Patient would like PRN medication. Will add hydroxyzine. Side effects of medication discussed with patient.  Orders:  -     hydrOXYzine HCL (ATARAX) 10 MG Tab; Take 1 tablet (10 mg total) by mouth 3 (three) times daily as needed (anxiety).             Follow up after labs and cardiac workup..    Qi Calhoun PA-C  Family Medicine Physician Assistant     Future Appointments       Date Specialty Appt Notes    3/6/2025 Lab fast    3/6/2025 Radiology xray               All of your core healthy metrics are met.       I spent a total of 30 minutes on the day of the visit.This includes face to face time and non-face to face time preparing to see the patient (eg, review of tests), obtaining and/or reviewing separately obtained history, documenting clinical information in the  electronic or other health record, independently interpreting results and communicating results to the patient/family/caregiver, or care coordinator.

## 2025-03-06 ENCOUNTER — HOSPITAL ENCOUNTER (OUTPATIENT)
Dept: RADIOLOGY | Facility: CLINIC | Age: 70
Discharge: HOME | End: 2025-03-06
Payer: COMMERCIAL

## 2025-03-06 ENCOUNTER — RESULTS FOLLOW-UP (OUTPATIENT)
Dept: FAMILY MEDICINE | Facility: CLINIC | Age: 70
End: 2025-03-06
Payer: COMMERCIAL

## 2025-03-06 DIAGNOSIS — R06.02 SHORTNESS OF BREATH: ICD-10-CM

## 2025-03-06 LAB
OHS QRS DURATION: 70 MS
OHS QTC CALCULATION: 412 MS

## 2025-03-06 PROCEDURE — 71046 X-RAY EXAM CHEST 2 VIEWS: CPT | Mod: TC,FY,PO

## 2025-03-06 PROCEDURE — 71046 X-RAY EXAM CHEST 2 VIEWS: CPT | Mod: 26,,, | Performed by: RADIOLOGY

## 2025-03-07 NOTE — TELEPHONE ENCOUNTER
Patient was notified of results. Patient verbalized understanding.     Patient is asking since the lab results were normal, does she still need to do echo and stress test? Please advise.

## 2025-03-07 NOTE — TELEPHONE ENCOUNTER
----- Message from Qi Calhoun PA-C sent at 3/6/2025  4:36 PM CST -----  Hi Ms. Donohue,     Chest x-ray shows no abnormality at this time. Other labs are still pending. I will update you once they result       Qi Calhoun PA-C  ----- Message -----  From: Interface, Rad Results In  Sent: 3/6/2025   9:52 AM CST  To: Qi Calhoun PA-C

## 2025-03-07 NOTE — TELEPHONE ENCOUNTER
----- Message from Qi Calhoun PA-C sent at 3/7/2025 10:07 AM CST -----  Hi Ms. Donohue,     I reviewed your lab results and they are as follows:     Blood count, HgA1C, electrolytes, kidney, and liver function are within normal limits. Cardiac marker is also within normal limits which is a good sign. Cholesterol slightly elevated, I recommend a   heart healthy diet rich in fiber, fresh vegetables and fruit and low in saturated fats (fried foods, red meat, etc.). Continue with recommendations discussed in clinic. Please let me know if you have   any further questions. Vitamin D is also still pending.     Qi Calhoun PA-C        ----- Message -----  From: [facility] Interface  Sent: 3/6/2025   1:21 PM CST  To: Qi Calhoun PA-C

## 2025-03-11 ENCOUNTER — TELEPHONE (OUTPATIENT)
Dept: FAMILY MEDICINE | Facility: CLINIC | Age: 70
End: 2025-03-11
Payer: COMMERCIAL

## 2025-03-11 NOTE — TELEPHONE ENCOUNTER
----- Message from Qi Calhoun PA-C sent at 3/11/2025 11:48 AM CDT -----  Vitamin D normal.   ----- Message -----  From: [facility] Interface  Sent: 3/6/2025   1:21 PM CDT  To: Qi Calhoun PA-C

## 2025-03-11 NOTE — TELEPHONE ENCOUNTER
Patient was notified of results. Patient verbalized understanding.     Patient has an appt Friday for stress test and echo.

## 2025-03-12 ENCOUNTER — TELEPHONE (OUTPATIENT)
Dept: FAMILY MEDICINE | Facility: CLINIC | Age: 70
End: 2025-03-12
Payer: COMMERCIAL

## 2025-03-12 NOTE — TELEPHONE ENCOUNTER
Call placed to patient x's 3. No answer received. Voicemail unavailable, unable to leave message.

## 2025-03-12 NOTE — TELEPHONE ENCOUNTER
Malu Burt Staff     ----- Message from Malu sent at 3/12/2025 11:29 AM CDT -----  Name of Who is Calling:BEN SANTIAGO [[id number]]    What is the request in detail:Pt requesting a call back today if possible to speak with the nurse.    Can the clinic reply by [facility]:Call    What Number to Call Back if not in [facility]:Telephone Information:Mobile   [phone]

## 2025-03-12 NOTE — TELEPHONE ENCOUNTER
Hi,     She can postpone this. Is she currently having persistent chest pain?     Qi Calhoun PA-C

## 2025-03-12 NOTE — TELEPHONE ENCOUNTER
Patient states she received a call from [facility]'s financial department advising her she has not met her insurance deductible yet and her out of pocket cost for echocardiogram and stress test will be $900.  Patient is requesting to send a message to JESSY Calhoun to see if there is any way she can postpone the above mentioned testing.  Message forwarded to provider as per patient's request. Please advise. Thank you.     Patient states if she does not answer when office returns call, to please send a message in [facility] with provider's reply.

## 2025-03-13 ENCOUNTER — TELEPHONE (OUTPATIENT)
Dept: CARDIOLOGY | Facility: HOSPITAL | Age: 70
End: 2025-03-13

## 2025-03-13 NOTE — TELEPHONE ENCOUNTER
Left message on voicemail.    Patient advised, test will be at [facility] ([address]  Will need to register on the first floor at the main entrance.  Patient advised that arrival time is 06:50.  Patient advised that they may be here about 3.5-4 hours, and may want to bring something to occupy their time, as there will be periods of waiting.    Patient advised, may take their medications prior to testing if you need to. Advised if medications are taken with food, please keep it light, like toast and juice.    Patient advised to avoid all caffeine 12 hours prior to testing.  This includes chocolate, decaf tea and coffee.    Will provide peanut butter crackers for a snack after stress test.  If patient would prefer something else, please bring a snack from home.    Wear comfortable clothing.  No lotions, oils, or powders to the upper chest area. May wear deodorant.    No metal jewelry, buttons, or zippers to the upper body.  Advised to call the office if any questions.    Will send instructions via [facility] as well.

## 2025-05-06 ENCOUNTER — TELEPHONE (OUTPATIENT)
Dept: DERMATOLOGY | Facility: CLINIC | Age: 70
End: 2025-05-06
Payer: COMMERCIAL

## 2025-05-06 NOTE — TELEPHONE ENCOUNTER
----- Message from Ayana sent at 5/6/2025  8:50 AM CDT -----  Type:  Sooner Apoointment RequestCaller is requesting a sooner appointment.   Name of Caller:evansWhen is the first available appointment?before octSymptoms:f/uWould the patient rather a call back or a response via [facility]? Call [facility] Call Back Number:[phone] Additional Information: pt would like a call for a sooner appt. Plz call to discuss.

## 2025-05-08 ENCOUNTER — TELEPHONE (OUTPATIENT)
Dept: DERMATOLOGY | Facility: CLINIC | Age: 70
End: 2025-05-08
Payer: COMMERCIAL

## 2025-05-08 NOTE — TELEPHONE ENCOUNTER
----- Message from [employer] sent at 5/8/2025  9:26 AM CDT -----  Regarding: Appt  Contact: Pt [phone]  NO AVAIL APPT Caller: self Reason for call: appt Reason appt not scheduled: no dates availPatient's DX: spots on bodyPatient requesting call back or [facility] msg:  please call

## 2025-06-16 ENCOUNTER — OFFICE VISIT (OUTPATIENT)
Dept: DERMATOLOGY | Facility: CLINIC | Age: 70
End: 2025-06-16
Payer: COMMERCIAL

## 2025-06-16 VITALS — BODY MASS INDEX: 29.5 KG/M2 | HEIGHT: 64 IN | WEIGHT: 172.81 LBS

## 2025-06-16 DIAGNOSIS — L57.0 ACTINIC KERATOSES: Primary | ICD-10-CM

## 2025-06-16 DIAGNOSIS — L82.1 SEBORRHEIC KERATOSES: ICD-10-CM

## 2025-06-16 DIAGNOSIS — Z08 ENCOUNTER FOR FOLLOW-UP SURVEILLANCE OF SKIN CANCER: ICD-10-CM

## 2025-06-16 DIAGNOSIS — D22.9 MULTIPLE BENIGN NEVI: ICD-10-CM

## 2025-06-16 DIAGNOSIS — L57.8 ACTINIC SKIN DAMAGE: ICD-10-CM

## 2025-06-16 DIAGNOSIS — Z85.828 ENCOUNTER FOR FOLLOW-UP SURVEILLANCE OF SKIN CANCER: ICD-10-CM

## 2025-06-16 PROCEDURE — 3044F HG A1C LEVEL LT 7.0%: CPT | Mod: CPTII,S$GLB,, | Performed by: DERMATOLOGY

## 2025-06-16 PROCEDURE — 3288F FALL RISK ASSESSMENT DOCD: CPT | Mod: CPTII,S$GLB,, | Performed by: DERMATOLOGY

## 2025-06-16 PROCEDURE — 1159F MED LIST DOCD IN RCRD: CPT | Mod: CPTII,S$GLB,, | Performed by: DERMATOLOGY

## 2025-06-16 PROCEDURE — 3008F BODY MASS INDEX DOCD: CPT | Mod: CPTII,S$GLB,, | Performed by: DERMATOLOGY

## 2025-06-16 PROCEDURE — 1160F RVW MEDS BY RX/DR IN RCRD: CPT | Mod: CPTII,S$GLB,, | Performed by: DERMATOLOGY

## 2025-06-16 PROCEDURE — 99213 OFFICE O/P EST LOW 20 MIN: CPT | Mod: 25,S$GLB,, | Performed by: DERMATOLOGY

## 2025-06-16 PROCEDURE — 1101F PT FALLS ASSESS-DOCD LE1/YR: CPT | Mod: CPTII,S$GLB,, | Performed by: DERMATOLOGY

## 2025-06-16 PROCEDURE — 17000 DESTRUCT PREMALG LESION: CPT | Mod: S$GLB,,, | Performed by: DERMATOLOGY

## 2025-06-16 PROCEDURE — 17003 DESTRUCT PREMALG LES 2-14: CPT | Mod: S$GLB,,, | Performed by: DERMATOLOGY

## 2025-06-16 NOTE — PATIENT INSTRUCTIONS

## 2025-06-16 NOTE — PROGRESS NOTES
Subjective:      Patient ID:  Gaby Donohue is a 70 y.o. female who presents for   Chief Complaint   Patient presents with    Spots and/or Floaters     Face      LOV 8/7/23 [facility], [place], [place]    Patient here today for spots to face.  Spots presents with no symptoms.     Derm Hx:  BCC right temple- 3/8/23 Matthew  4 Mohs operations to face and scalp over the past 20 years  Denies Fhx MM    Current Outpatient Medications:   ·  DULoxetine (CYMBALTA) 30 MG capsule, Take 1 capsule (30 mg total) by mouth once daily., Disp: 90 capsule, Rfl: 3  ·  DULoxetine (CYMBALTA) 60 MG capsule, Take 1 capsule (60 mg total) by mouth once daily., Disp: 90 capsule, Rfl: 3  ·  omeprazole (PRILOSEC) 40 MG capsule, Take 1 capsule (40 mg total) by mouth every morning., Disp: 90 capsule, Rfl: 0  ·  solifenacin (VESICARE) 5 MG tablet, Take 1 tablet (5 mg total) by mouth once daily., Disp: 90 tablet, Rfl: 3          Review of Systems   Constitutional:  Negative for fever, chills and fatigue.   Skin:  Positive for dry skin, activity-related sunscreen use and wears hat. Negative for itching and daily sunscreen use.       Objective:   Physical Exam   Constitutional: She appears well-developed and well-nourished.   Neurological: She is alert and oriented to person, place, and time.   Psychiatric: She has a normal mood and affect.   Skin:   Areas Examined (abnormalities noted in diagram):   Head / Face Inspection Performed            Diagram Legend     Erythematous scaling macule/papule c/w actinic keratosis       Vascular papule c/w angioma      Pigmented verrucoid papule/plaque c/w seborrheic keratosis      Yellow umbilicated papule c/w sebaceous hyperplasia      Irregularly shaped tan macule c/w lentigo     1-2 mm smooth white papules consistent with Milia      Movable subcutaneous cyst with punctum c/w epidermal inclusion cyst      Subcutaneous movable cyst c/w pilar cyst      Firm pink to brown papule c/w dermatofibroma      Pedunculated fleshy  papule(s) c/w skin tag(s)      Evenly pigmented macule c/w junctional nevus     Mildly variegated pigmented, slightly irregular-bordered macule c/w mildly atypical nevus      Flesh colored to evenly pigmented papule c/w intradermal nevus       Pink pearly papule/plaque c/w basal cell carcinoma      Erythematous hyperkeratotic cursted plaque c/w SCC      Surgical scar with no sign of skin cancer recurrence      Open and closed comedones      Inflammatory papules and pustules      Verrucoid papule consistent consistent with wart     Erythematous eczematous patches and plaques     Dystrophic onycholytic nail with subungual debris c/w onychomycosis     Umbilicated papule    Erythematous-base heme-crusted tan verrucoid plaque consistent with inflamed seborrheic keratosis     Erythematous Silvery Scaling Plaque c/w Psoriasis     See annotation      Assessment / Plan:        Actinic keratoses  Cryosurgery Procedure Note    Verbal consent from the patient is obtained and the patient is aware of the precancerous quality and need for treatment of these lesions. Liquid nitrogen cryosurgery is applied to the 2 actinic keratoses, as detailed in the physical exam, to produce a freeze injury. The patient is aware that blisters may form and is instructed on wound care with gentle cleansing and use of vaseline ointment to keep moist until healed. The patient is supplied a handout on cryosurgery and is instructed to call if lesions do not completely resolve.    Seborrheic keratoses  These are benign inherited growths without a malignant potential. Reassurance given to patient. No treatment is necessary.     Multiple benign nevi  Careful dermoscopy evaluation of nevi performed with none identified as needing biopsy today  Monitor for new mole or moles that are becoming bigger, darker, irritated, or developing irregular borders.     Actinic skin damage  Patient instructed in importance in daily broad spectrum sun protection of at least  spf 30. Mineral sunscreen ingredients preferred (Zinc +/- Titanium) and can be found OTC.   Patient encouraged to wear hat for all outdoor exposure.   Also discussed sun avoidance and use of protective clothing.    Encounter for follow-up surveillance of skin cancer  Area of previous NMSC (multiple) examined. Sites well healed with no signs of recurrence.  No lesions suspicious for malignancy noted.           Follow up in about 1 year (around 6/16/2026), or if symptoms worsen or fail to improve.

## 2025-06-23 DIAGNOSIS — K21.9 GASTROESOPHAGEAL REFLUX DISEASE WITHOUT ESOPHAGITIS: ICD-10-CM

## 2025-06-23 RX ORDER — OMEPRAZOLE 40 MG/1
40 CAPSULE, DELAYED RELEASE ORAL EVERY MORNING
Qty: 90 CAPSULE | Refills: 0 | Status: SHIPPED | OUTPATIENT
Start: 2025-06-23

## (undated) DEVICE — BIT DRILL 2.0.

## (undated) DEVICE — GLOVE SURG ULTRA TOUCH 8

## (undated) DEVICE — GOWN POLY REINF BRTH SLV XL

## (undated) DEVICE — GOWN POLY REINF BRTH SLV LG

## (undated) DEVICE — PAD CAST SPECIALIST STRL 4

## (undated) DEVICE — DRAPE C ARM 42 X 120 10/BX

## (undated) DEVICE — SOL NACL IRR 1000ML BTL

## (undated) DEVICE — TRAY DRY SPONGE SCRUB W FOAM

## (undated) DEVICE — APPLICATOR CHLORAPREP ORN 26ML

## (undated) DEVICE — STRAP OR TABLE 5IN X 72IN

## (undated) DEVICE — TOURNIQUET SB QC DP 18X4IN

## (undated) DEVICE — DRAPE HAND STERILE

## (undated) DEVICE — SCREW CORTEX 2.7 X 16MM
Type: IMPLANTABLE DEVICE | Site: WRIST | Status: NON-FUNCTIONAL
Removed: 2023-06-26

## (undated) DEVICE — BIT DRILL QC 1.8X110MM

## (undated) DEVICE — DRESSING N ADH OIL EMUL 3X3

## (undated) DEVICE — DRAPE STERI U-SHAPED 47X51IN

## (undated) DEVICE — BANDAGE ESMARK ELASTIC ST 4X9

## (undated) DEVICE — PACK CUSTOM UNIV BASIN SLI

## (undated) DEVICE — SLEEVE SCD EXPRESS KNEE MEDIUM

## (undated) DEVICE — SUT 3-0 VICRYL / SH (J416)

## (undated) DEVICE — SUT ETHILON 4-0 PS2 18 BLK

## (undated) DEVICE — PAD CAST SPECIALIST STRL 3

## (undated) DEVICE — SLING ORTHOPEDIC MEDIUM

## (undated) DEVICE — ELECTRODE REM PLYHSV RETURN 9

## (undated) DEVICE — SUCTION FRAZIER TIP SURG 12FR

## (undated) DEVICE — GOWN POLY REINF X-LONG XL

## (undated) DEVICE — SUT 2-0 VICRYL / SH (J417)

## (undated) DEVICE — DRAPE U SPLIT SHEET 54X76IN

## (undated) DEVICE — SPONGE BULKEE II ABSRB 6X6.75

## (undated) DEVICE — BANDAGE MATRIX HK LOOP 4IN 5YD

## (undated) DEVICE — DRAPE THREE-QTR REINF 53X77IN

## (undated) DEVICE — PACK SIRUS BASIC V SURG STRL